# Patient Record
Sex: FEMALE | Race: WHITE | NOT HISPANIC OR LATINO | Employment: FULL TIME | URBAN - METROPOLITAN AREA
[De-identification: names, ages, dates, MRNs, and addresses within clinical notes are randomized per-mention and may not be internally consistent; named-entity substitution may affect disease eponyms.]

---

## 2017-03-23 ENCOUNTER — GENERIC CONVERSION - ENCOUNTER (OUTPATIENT)
Dept: OTHER | Facility: OTHER | Age: 47
End: 2017-03-23

## 2017-06-13 ENCOUNTER — GENERIC CONVERSION - ENCOUNTER (OUTPATIENT)
Dept: OTHER | Facility: OTHER | Age: 47
End: 2017-06-13

## 2017-06-21 ENCOUNTER — GENERIC CONVERSION - ENCOUNTER (OUTPATIENT)
Dept: OTHER | Facility: OTHER | Age: 47
End: 2017-06-21

## 2017-06-26 ENCOUNTER — ALLSCRIPTS OFFICE VISIT (OUTPATIENT)
Dept: OTHER | Facility: OTHER | Age: 47
End: 2017-06-26

## 2017-06-26 ENCOUNTER — APPOINTMENT (OUTPATIENT)
Dept: LAB | Facility: CLINIC | Age: 47
End: 2017-06-26
Payer: COMMERCIAL

## 2017-06-26 DIAGNOSIS — E55.9 VITAMIN D DEFICIENCY: ICD-10-CM

## 2017-06-26 DIAGNOSIS — E78.00 PURE HYPERCHOLESTEROLEMIA: ICD-10-CM

## 2017-06-26 LAB
25(OH)D3 SERPL-MCNC: 35.7 NG/ML (ref 30–100)
ALBUMIN SERPL BCP-MCNC: 3.6 G/DL (ref 3.5–5)
ALP SERPL-CCNC: 76 U/L (ref 46–116)
ALT SERPL W P-5'-P-CCNC: 22 U/L (ref 12–78)
ANION GAP SERPL CALCULATED.3IONS-SCNC: 4 MMOL/L (ref 4–13)
AST SERPL W P-5'-P-CCNC: 22 U/L (ref 5–45)
BASOPHILS # BLD AUTO: 0.03 THOUSANDS/ΜL (ref 0–0.1)
BASOPHILS NFR BLD AUTO: 1 % (ref 0–1)
BILIRUB SERPL-MCNC: 0.44 MG/DL (ref 0.2–1)
BUN SERPL-MCNC: 15 MG/DL (ref 5–25)
CALCIUM SERPL-MCNC: 8.3 MG/DL (ref 8.3–10.1)
CHLORIDE SERPL-SCNC: 108 MMOL/L (ref 100–108)
CHOLEST SERPL-MCNC: 204 MG/DL (ref 50–200)
CO2 SERPL-SCNC: 29 MMOL/L (ref 21–32)
CREAT SERPL-MCNC: 0.62 MG/DL (ref 0.6–1.3)
EOSINOPHIL # BLD AUTO: 0.11 THOUSAND/ΜL (ref 0–0.61)
EOSINOPHIL NFR BLD AUTO: 2 % (ref 0–6)
ERYTHROCYTE [DISTWIDTH] IN BLOOD BY AUTOMATED COUNT: 13.2 % (ref 11.6–15.1)
GFR SERPL CREATININE-BSD FRML MDRD: >60 ML/MIN/1.73SQ M
GLUCOSE P FAST SERPL-MCNC: 100 MG/DL (ref 65–99)
HCT VFR BLD AUTO: 39.9 % (ref 34.8–46.1)
HDLC SERPL-MCNC: 56 MG/DL (ref 40–60)
HGB BLD-MCNC: 13.1 G/DL (ref 11.5–15.4)
LDLC SERPL CALC-MCNC: 138 MG/DL (ref 0–100)
LYMPHOCYTES # BLD AUTO: 1.74 THOUSANDS/ΜL (ref 0.6–4.47)
LYMPHOCYTES NFR BLD AUTO: 38 % (ref 14–44)
MCH RBC QN AUTO: 31.6 PG (ref 26.8–34.3)
MCHC RBC AUTO-ENTMCNC: 32.8 G/DL (ref 31.4–37.4)
MCV RBC AUTO: 96 FL (ref 82–98)
MONOCYTES # BLD AUTO: 0.43 THOUSAND/ΜL (ref 0.17–1.22)
MONOCYTES NFR BLD AUTO: 10 % (ref 4–12)
NEUTROPHILS # BLD AUTO: 2.22 THOUSANDS/ΜL (ref 1.85–7.62)
NEUTS SEG NFR BLD AUTO: 49 % (ref 43–75)
NRBC BLD AUTO-RTO: 0 /100 WBCS
PLATELET # BLD AUTO: 200 THOUSANDS/UL (ref 149–390)
PMV BLD AUTO: 13.2 FL (ref 8.9–12.7)
POTASSIUM SERPL-SCNC: 4.5 MMOL/L (ref 3.5–5.3)
PROT SERPL-MCNC: 6.5 G/DL (ref 6.4–8.2)
RBC # BLD AUTO: 4.15 MILLION/UL (ref 3.81–5.12)
SODIUM SERPL-SCNC: 141 MMOL/L (ref 136–145)
TRIGL SERPL-MCNC: 49 MG/DL
TSH SERPL DL<=0.05 MIU/L-ACNC: 1.4 UIU/ML (ref 0.36–3.74)
WBC # BLD AUTO: 4.55 THOUSAND/UL (ref 4.31–10.16)

## 2017-06-26 PROCEDURE — 80061 LIPID PANEL: CPT

## 2017-06-26 PROCEDURE — 85025 COMPLETE CBC W/AUTO DIFF WBC: CPT

## 2017-06-26 PROCEDURE — 80053 COMPREHEN METABOLIC PANEL: CPT

## 2017-06-26 PROCEDURE — 82306 VITAMIN D 25 HYDROXY: CPT

## 2017-06-26 PROCEDURE — 36415 COLL VENOUS BLD VENIPUNCTURE: CPT

## 2017-06-26 PROCEDURE — 84443 ASSAY THYROID STIM HORMONE: CPT

## 2017-10-11 DIAGNOSIS — E55.9 VITAMIN D DEFICIENCY: ICD-10-CM

## 2017-10-11 DIAGNOSIS — E78.00 PURE HYPERCHOLESTEROLEMIA: ICD-10-CM

## 2017-12-15 ENCOUNTER — ALLSCRIPTS OFFICE VISIT (OUTPATIENT)
Dept: OTHER | Facility: OTHER | Age: 47
End: 2017-12-15

## 2017-12-15 ENCOUNTER — APPOINTMENT (OUTPATIENT)
Dept: LAB | Facility: CLINIC | Age: 47
End: 2017-12-15
Payer: COMMERCIAL

## 2017-12-15 DIAGNOSIS — E55.9 VITAMIN D DEFICIENCY: ICD-10-CM

## 2017-12-15 DIAGNOSIS — E78.00 PURE HYPERCHOLESTEROLEMIA: ICD-10-CM

## 2017-12-15 LAB
25(OH)D3 SERPL-MCNC: 25.3 NG/ML (ref 30–100)
ALBUMIN SERPL BCP-MCNC: 4 G/DL (ref 3.5–5)
ALP SERPL-CCNC: 78 U/L (ref 46–116)
ALT SERPL W P-5'-P-CCNC: 21 U/L (ref 12–78)
ANION GAP SERPL CALCULATED.3IONS-SCNC: 7 MMOL/L (ref 4–13)
AST SERPL W P-5'-P-CCNC: 17 U/L (ref 5–45)
BASOPHILS # BLD AUTO: 0.03 THOUSANDS/ΜL (ref 0–0.1)
BASOPHILS NFR BLD AUTO: 1 % (ref 0–1)
BILIRUB SERPL-MCNC: 0.66 MG/DL (ref 0.2–1)
BUN SERPL-MCNC: 11 MG/DL (ref 5–25)
CALCIUM SERPL-MCNC: 9.5 MG/DL (ref 8.3–10.1)
CHLORIDE SERPL-SCNC: 106 MMOL/L (ref 100–108)
CHOLEST SERPL-MCNC: 193 MG/DL (ref 50–200)
CO2 SERPL-SCNC: 26 MMOL/L (ref 21–32)
CREAT SERPL-MCNC: 0.66 MG/DL (ref 0.6–1.3)
EOSINOPHIL # BLD AUTO: 0.13 THOUSAND/ΜL (ref 0–0.61)
EOSINOPHIL NFR BLD AUTO: 2 % (ref 0–6)
ERYTHROCYTE [DISTWIDTH] IN BLOOD BY AUTOMATED COUNT: 12.4 % (ref 11.6–15.1)
GFR SERPL CREATININE-BSD FRML MDRD: 106 ML/MIN/1.73SQ M
GLUCOSE P FAST SERPL-MCNC: 99 MG/DL (ref 65–99)
HCT VFR BLD AUTO: 41.3 % (ref 34.8–46.1)
HDLC SERPL-MCNC: 53 MG/DL (ref 40–60)
HGB BLD-MCNC: 13.8 G/DL (ref 11.5–15.4)
LDLC SERPL CALC-MCNC: 124 MG/DL (ref 0–100)
LYMPHOCYTES # BLD AUTO: 1.5 THOUSANDS/ΜL (ref 0.6–4.47)
LYMPHOCYTES NFR BLD AUTO: 25 % (ref 14–44)
MCH RBC QN AUTO: 31.2 PG (ref 26.8–34.3)
MCHC RBC AUTO-ENTMCNC: 33.4 G/DL (ref 31.4–37.4)
MCV RBC AUTO: 93 FL (ref 82–98)
MONOCYTES # BLD AUTO: 0.49 THOUSAND/ΜL (ref 0.17–1.22)
MONOCYTES NFR BLD AUTO: 8 % (ref 4–12)
NEUTROPHILS # BLD AUTO: 3.91 THOUSANDS/ΜL (ref 1.85–7.62)
NEUTS SEG NFR BLD AUTO: 64 % (ref 43–75)
NRBC BLD AUTO-RTO: 0 /100 WBCS
PLATELET # BLD AUTO: 223 THOUSANDS/UL (ref 149–390)
PMV BLD AUTO: 12.6 FL (ref 8.9–12.7)
POTASSIUM SERPL-SCNC: 4.4 MMOL/L (ref 3.5–5.3)
PROT SERPL-MCNC: 7.4 G/DL (ref 6.4–8.2)
RBC # BLD AUTO: 4.43 MILLION/UL (ref 3.81–5.12)
SODIUM SERPL-SCNC: 139 MMOL/L (ref 136–145)
TRIGL SERPL-MCNC: 78 MG/DL
TSH SERPL DL<=0.05 MIU/L-ACNC: 1.05 UIU/ML (ref 0.36–3.74)
WBC # BLD AUTO: 6.08 THOUSAND/UL (ref 4.31–10.16)

## 2017-12-15 PROCEDURE — 80053 COMPREHEN METABOLIC PANEL: CPT

## 2017-12-15 PROCEDURE — 82306 VITAMIN D 25 HYDROXY: CPT

## 2017-12-15 PROCEDURE — 80061 LIPID PANEL: CPT

## 2017-12-15 PROCEDURE — 85025 COMPLETE CBC W/AUTO DIFF WBC: CPT

## 2017-12-15 PROCEDURE — 36415 COLL VENOUS BLD VENIPUNCTURE: CPT

## 2017-12-15 PROCEDURE — 84443 ASSAY THYROID STIM HORMONE: CPT

## 2017-12-16 NOTE — PROGRESS NOTES
Assessment  1  Acute bronchitis (466 0) (J20 9)    Plan  Acute bronchitis    · Azithromycin 250 MG Oral Tablet; TAKE 2 TABLETS ON DAY 1 THEN TAKE 1TABLET A DAY FOR 4 DAYS   · Benzonatate 100 MG Oral Capsule; TAKE 1 CAPSULE 3 TIMES DAILY AS NEEDED   · Proventil  (90 Base) MCG/ACT Inhalation Aerosol Solution; INHALE 1 TO 2PUFFS EVERY 4 TO 6 HOURS AS NEEDED    Discussion/Summary    She will take Tylenol as needed  The patient was counseled regarding instructions for management,-- risk factor reductions,-- risks and benefits of treatment options,-- importance of compliance with treatment  Possible side effects of new medications were reviewed with the patient/guardian today  The treatment plan was reviewed with the patient/guardian  The patient/guardian understands and agrees with the treatment plan      Chief Complaint  cold      History of Present Illness  Bronchitis, Acute, Adult (Brief): The patient is being seen for worsening symptoms of acute bronchitis  Symptoms:  productive cough,-- wheezing,-- shortness of breath,-- fatigue,-- sore throat-- and-- stuffy nose  The patient is currently experiencing symptoms  Associated symptoms:  headache-- and-- postnasal drainage  Current treatment includes non-prescription cough suppressants  Review of Systems   Constitutional: as noted in HPI   ENT: as noted in HPI  Cardiovascular: no complaints of slow or fast heart rate, no chest pain, no palpitations, no leg claudication or lower extremity edema  Respiratory: as noted in HPI  Breasts: no complaints of breast pain, breast lump or nipple discharge  Gastrointestinal: no complaints of abdominal pain, no constipation, no nausea or diarrhea, no vomiting, no bloody stools  Genitourinary: no complaints of dysuria, no incontinence, no pelvic pain, no dysmenorrhea, no vaginal discharge or abnormal vaginal bleeding    Musculoskeletal: no complaints of arthralgia, no myalgia, no joint swelling or stiffness, no limb pain or swelling  Integumentary: no complaints of skin rash or lesion, no itching or dry skin, no skin wounds  Neurological: no complaints of headache, no confusion, no numbness or tingling, no dizziness or fainting  Active Problems  1  Breast lump (611 72) (N63 0)   2  Depression (311) (F32 9)   3  Hypercholesteremia (272 0) (E78 00)   4  Obesity (278 00) (E66 9)   5  Systemic lupus erythematosus (710 0) (M32 9)   6  Vitamin D deficiency (268 9) (E55 9)    Past Medical History  Active Problems And Past Medical History Reviewed: The active problems and past medical history were reviewed and updated today  Social History   · Currently working   · Teacher   ·    · Minimum alcohol consumption   · Never a smoker   · Non-smoker (V49 89) (Z78 9)  The social history was reviewed and updated today  The social history was reviewed and is unchanged  Current Meds   1  SAMe 400 MG Oral Tablet; Therapy: [id number] to Recorded    The medication list was reviewed and updated today  Allergies  1  Codeine Derivatives   2  Contrast Media Ready-Box MISC   3  Hydrocodone-Acetaminophen TABS   4  Morphine Sulfate TABS   5  Oxycodone-Acetaminophen TABS   6  Vicodin TABS  7  No Known Environmental Allergies   8  No Known Food Allergies    Vitals   Recorded: [id number] 11:33AM   Temperature 98 4 F   Heart Rate 68   Respiration 14   Systolic 432   Diastolic 78   Height 5 ft 1 in   Weight 160 lb 8 oz   BMI Calculated 30 33   BSA Calculated 1 72     Physical Exam   Constitutional  General appearance: Abnormal   appears tired  Eyes  Conjunctiva and lids: No swelling, erythema or discharge  Ears, Nose, Mouth, and Throat  External inspection of ears and nose: Normal    Otoscopic examination: Tympanic membranes translucent with normal light reflex  Canals patent without erythema  Oropharynx: Abnormal   The posterior pharynx was erythematous    Pulmonary  Respiratory effort: No increased work of breathing or signs of respiratory distress  Auscultation of lungs: Abnormal   Auscultation of the lungs revealed expiratory wheezing  rhonchi over both bases  Cardiovascular  Palpation of heart: Normal PMI, no thrills  Auscultation of heart: Normal rate and rhythm, normal S1 and S2, without murmurs  Abdomen  Abdomen: Non-tender, no masses  Lymphatic  Palpation of lymph nodes in neck: No lymphadenopathy  Musculoskeletal  Gait and station: Normal    Skin  Skin and subcutaneous tissue: Normal without rashes or lesions  Neurologic  Cranial nerves: Cranial nerves 2-12 intact  Psychiatric  Orientation to person, place, and time: Normal    Mood and affect: Normal          Future Appointments    Date/Time Provider Specialty Site   01/04/2018 03:45 PM IVA Jolley   Internal [facility]     Signatures   Electronically signed by : IVA Olmos ; Dec 15 2017 11:56AM EST                       (Author)

## 2018-01-12 VITALS
HEIGHT: 61 IN | WEIGHT: 190.13 LBS | SYSTOLIC BLOOD PRESSURE: 114 MMHG | BODY MASS INDEX: 35.9 KG/M2 | HEART RATE: 72 BPM | DIASTOLIC BLOOD PRESSURE: 70 MMHG

## 2018-01-23 VITALS
HEIGHT: 61 IN | RESPIRATION RATE: 14 BRPM | TEMPERATURE: 98.4 F | SYSTOLIC BLOOD PRESSURE: 110 MMHG | DIASTOLIC BLOOD PRESSURE: 78 MMHG | WEIGHT: 160.5 LBS | HEART RATE: 68 BPM | BODY MASS INDEX: 30.3 KG/M2

## 2019-02-04 RX ORDER — VENLAFAXINE HYDROCHLORIDE 75 MG/1
CAPSULE, EXTENDED RELEASE ORAL
COMMUNITY
Start: 2014-08-25 | End: 2019-02-06

## 2019-02-04 RX ORDER — CHOLECALCIFEROL (VITAMIN D3) 125 MCG
1 CAPSULE ORAL DAILY
COMMUNITY
Start: 2017-12-15 | End: 2019-02-06

## 2019-02-04 RX ORDER — ALBUTEROL SULFATE 90 UG/1
2 AEROSOL, METERED RESPIRATORY (INHALATION)
COMMUNITY
Start: 2017-12-15 | End: 2019-02-06

## 2019-02-05 ENCOUNTER — TELEPHONE (OUTPATIENT)
Dept: INTERNAL MEDICINE CLINIC | Facility: CLINIC | Age: 49
End: 2019-02-05

## 2019-02-05 NOTE — TELEPHONE ENCOUNTER
Patient has an appt with Napoleon Pickard on 2/6    She dropped off blood work that she got done at another office    Put in [employer]

## 2019-02-06 ENCOUNTER — APPOINTMENT (OUTPATIENT)
Dept: LAB | Facility: CLINIC | Age: 49
End: 2019-02-06
Payer: COMMERCIAL

## 2019-02-06 ENCOUNTER — OFFICE VISIT (OUTPATIENT)
Dept: INTERNAL MEDICINE CLINIC | Facility: CLINIC | Age: 49
End: 2019-02-06
Payer: COMMERCIAL

## 2019-02-06 VITALS
SYSTOLIC BLOOD PRESSURE: 100 MMHG | HEART RATE: 58 BPM | OXYGEN SATURATION: 98 % | BODY MASS INDEX: 32.97 KG/M2 | WEIGHT: 174.6 LBS | HEIGHT: 61 IN | DIASTOLIC BLOOD PRESSURE: 70 MMHG

## 2019-02-06 DIAGNOSIS — R39.9 UTI SYMPTOMS: ICD-10-CM

## 2019-02-06 DIAGNOSIS — F41.9 ANXIETY: ICD-10-CM

## 2019-02-06 DIAGNOSIS — F33.41 RECURRENT MAJOR DEPRESSIVE DISORDER, IN PARTIAL REMISSION (HCC): Primary | ICD-10-CM

## 2019-02-06 DIAGNOSIS — E78.00 HYPERCHOLESTEREMIA: ICD-10-CM

## 2019-02-06 DIAGNOSIS — E66.9 OBESITY (BMI 30-39.9): ICD-10-CM

## 2019-02-06 DIAGNOSIS — M32.9 SYSTEMIC LUPUS ERYTHEMATOSUS, UNSPECIFIED SLE TYPE, UNSPECIFIED ORGAN INVOLVEMENT STATUS (HCC): ICD-10-CM

## 2019-02-06 DIAGNOSIS — E55.9 VITAMIN D DEFICIENCY: ICD-10-CM

## 2019-02-06 PROBLEM — N63.0 BREAST LUMP: Status: ACTIVE | Noted: 2017-06-26

## 2019-02-06 PROCEDURE — 87077 CULTURE AEROBIC IDENTIFY: CPT

## 2019-02-06 PROCEDURE — 99214 OFFICE O/P EST MOD 30 MIN: CPT | Performed by: INTERNAL MEDICINE

## 2019-02-06 PROCEDURE — 81001 URINALYSIS AUTO W/SCOPE: CPT | Performed by: INTERNAL MEDICINE

## 2019-02-06 PROCEDURE — 87147 CULTURE TYPE IMMUNOLOGIC: CPT

## 2019-02-06 PROCEDURE — 87186 SC STD MICRODIL/AGAR DIL: CPT

## 2019-02-06 PROCEDURE — 87086 URINE CULTURE/COLONY COUNT: CPT

## 2019-02-06 NOTE — PROGRESS NOTES
INTERNAL MEDICINE OFFICE VISIT  [facility] AT [address]  Tel: [phone]      NAME: Jarad Rhodes  AGE: 50 y o  SEX: female  : 1970   MRN: [id number]    DATE: 2019  TIME: 5:14 PM      Assessment and Plan:  1  Recurrent major depressive disorder, in partial remission ([facility] )  Patient was on Effexor earlier but does not want take medication any more  She was told to follow up with the therapist regularly  2  Anxiety  She was considering taking Xanax as needed  I did not encourage her to take the benzodiazepines as a treatment for anxiety  I told her to take the SSRIs instead but she was not ready to take it  She will think about it and let me know if she needs medication  3  Hypercholesteremia  She was told to take a low-fat diet    4  Systemic lupus erythematosus, unspecified SLE type, unspecified organ involvement status ([facility] )  Follow up with Rheumatology    5  Vitamin D deficiency  She is not taking the vitamin-D supplement  6  Obesity (BMI 30-39  9)  BMI Counseling: Body mass index is 32 99 kg/m²  Discussed the patient's BMI with her  The BMI is above average  BMI counseling and education was provided to the patient  Nutrition recommendations include reducing portion sizes, decreasing overall calorie intake and moderation in carbohydrate intake  Exercise recommendations include moderate aerobic physical activity for 150 minutes/week  7  UTI symptoms    - Urine culture; Future  - Urinalysis with microscopic      - Counseling Documentation: patient was counseled regarding: diagnostic results, instructions for management, risk factor reductions, prognosis, patient and family education, impressions, risks and benefits of treatment options and importance of compliance with treatment  - Medication Side Effects: Adverse side effects of medications were reviewed with the patient/guardian today        Return for follow up visit in 4 months or earlier, if needed  Chief Complaint:  Chief Complaint   Patient presents with    Follow-up     depression         History of Present Illness:   Depression-she has been following up with a therapist but does not want take the Effexor again  Anxiety-she is considering taking Xanax as needed  Hyperlipidemia-she is not taking any medication and wants to control her diet  Lupus-she has been following up with Rheumatology but is not taking any medication  Currently she is having a flare up  Vitamin-D deficiency-she has been deficient in vitamin-D but does not want take the supplement  Obesity-she has to lose weight  UTI symptoms-she had a urine microscopic exam and it was abnormal       Active Problem List:  Patient Active Problem List   Diagnosis    Recurrent major depressive disorder, in partial remission ([facility] )    Breast lump    Hypercholesteremia    Obesity (BMI 30-39  9)    Systemic lupus erythematosus ([facility] )    Vitamin D deficiency    Anxiety         Past Medical History:  Past Medical History:   Diagnosis Date    Anxiety     Chronic pain of right knee 2016    Last assessed     Lupus     Migrainous headache without aura 2016    Last assessed         Past Surgical History:  Past Surgical History:   Procedure Laterality Date    APPENDECTOMY      CARPAL TUNNEL RELEASE       SECTION      x2    KNEE ARTHROSCOPY Right     MYOMECTOMY           Family History:  Family History   Problem Relation Age of Onset    Other Mother         Back disorder     Diabetes Father     Hypertension Father     Other Sister         Back disorder     Cancer Paternal Grandmother          Social History:  Social History     Social History    Marital status: /Civil Union     Spouse name: N/A    Number of children: N/A    Years of education: N/A     Occupational History    Teacher [employer]     Social History Main Topics    Smoking status: Never Smoker    Smokeless tobacco: Never Used    Alcohol use Yes      Comment: Minimal     Drug use: No    Sexual activity: Not Asked     Other Topics Concern    None     Social History Narrative    None         Allergies: Allergies   Allergen Reactions    Acetaminophen-Codeine     Codeine Sulfate     Hydrocodone     Iodinated Diagnostic Agents     Iodine     Morphine     Oxycodone          Medications:  No current outpatient prescriptions on file        The following portions of the patient's history were reviewed and updated as appropriate: past medical history, past surgical history, family history, social history, allergies, current medications and active problem list       Review of Systems:  Constitutional: Denies fever, chills, weight gain, weight loss, fatigue  Eyes: Denies eye redness, eye discharge, double vision, change in visual acuity  ENT: Denies hearing loss, tinnitus, sneezing, nasal congestion, nasal discharge, sore throat   Respiratory: Denies cough, expectoration, hemoptysis, shortness of breath, wheezing  Cardiovascular: Denies chest pain, palpitations, lower extremity swelling, orthopnea, PND  Gastrointestinal: Denies abdominal pain, heartburn, nausea, vomiting, hematemesis, diarrhea, bloody stools  Genito-Urinary: Denies dysuria, frequency, difficulty in micturition, nocturia, incontinence  Musculoskeletal: Denies back pain, joint pain, muscle pain  Neurologic: Denies confusion, lightheadedness, syncope, headache, focal weakness, sensory changes, seizures  Endocrine: Denies polyuria, polydipsia, temperature intolerance  Allergy and Immunology: Denies hives, insect bite sensitivity  Hematological and Lymphatic: Denies bleeding problems, swollen glands   Psychological:  has depression, anxiety, mood swings  Dermatological: Denies pruritus, rash, skin lesion changes      Vitals:  Vitals:    02/06/19 1643   BP: 100/70   Pulse: 58   SpO2: 98%       Body mass index is 32 99 kg/m²  Weight (last 2 days)     Date/Time   Weight    02/06/19 1643  79 2 (174 6)                Physical Examination:  General: Patient is not in acute distress  Awake, alert, responding to commands  No weight gain or loss  Head: Normocephalic  Atraumatic  Eyes: Conjunctiva and lids with no swelling, erythema or discharge  Both pupils normal sized, round and reactive to light  Sclera nonicteric  ENT: External examination of nose and ear normal  Otoscopic examination shows translucent tympanic membranes with patent canals without erythema  Oropharynx moist with no erythema, edema, exudate or lesions  Neck: Supple  JVP not raised  Trachea midline  No masses  No thyromegaly  Lungs: No signs of increased work of breathing or respiratory distress  Bilateral bronchovascular breath sounds with no crackles or rhonchi  Chest wall: No tenderness  Cardiovascular: Normal PMI  No thrills  Regular rate and rhythm  S1 and S2 normal  No murmur, rub or gallop  Gastrointestinal: Abdomen soft, nontender  No guarding or rigidity  Liver and spleen not palpable  Bowel sounds present  Neurologic: Cranial nerves II-XII intact   Cortical functions normal  Motor system - Reflexes 2+ and symmetrical  Sensations normal  Musculoskeletal: Gait normal  No joint tenderness  Integumentary: Skin normal with no rash or lesions  Lymphatic: No palpable lymph nodes in neck, axilla or groin  Extremities: No clubbing, cyanosis, edema or varicosities  Psychological: Judgement and insight normal  Depressed      Laboratory Results:  CBC with diff:   Lab Results   Component Value Date    WBC 6 08 12/15/2017    RBC 4 43 12/15/2017    HGB 13 8 12/15/2017    HCT 41 3 12/15/2017    MCV 93 12/15/2017    MCH 31 2 12/15/2017    RDW 12 4 12/15/2017     12/15/2017       CMP:  Lab Results   Component Value Date    CREATININE 0 66 12/15/2017    BUN 11 12/15/2017    K 4 4 12/15/2017     12/15/2017    CO2 26 12/15/2017    ALKPHOS 78 12/15/2017    ALT 21 12/15/2017    AST 17 12/15/2017       No results found for: HGBA1C, MG, PHOS    No results found for: TROPONINI, CKMB, CKTOTAL    Lipid Profile:   No results found for: CHOL  Lab Results   Component Value Date    HDL 53 12/15/2017    HDL 56 06/26/2017     Lab Results   Component Value Date    LDLCALC 124 (H) 12/15/2017    LDLCALC 138 (H) 06/26/2017     Lab Results   Component Value Date    TRIG 78 12/15/2017    TRIG 49 06/26/2017         Health Maintenance:  Health Maintenance   Topic Date Due    DTaP,Tdap,and Td Vaccines (1 - Tdap) 06/06/1991    PAP SMEAR  06/06/1991    MAMMOGRAM  07/10/2019    Depression Screening PHQ  02/06/2020    INFLUENZA VACCINE  Completed     Immunization History   Administered Date(s) Administered    Influenza Quadrivalent Preservative Free 3 years and older IM 08/29/2017         Juan Manuel Gilbert MD  2/6/2019,5:14 PM

## 2019-02-07 LAB
BACTERIA UR QL AUTO: ABNORMAL /HPF
BILIRUB UR QL STRIP: NEGATIVE
CLARITY UR: CLEAR
COLOR UR: YELLOW
GLUCOSE UR STRIP-MCNC: NEGATIVE MG/DL
HGB UR QL STRIP.AUTO: NEGATIVE
KETONES UR STRIP-MCNC: NEGATIVE MG/DL
LEUKOCYTE ESTERASE UR QL STRIP: NEGATIVE
MUCOUS THREADS UR QL AUTO: ABNORMAL
NITRITE UR QL STRIP: POSITIVE
NON-SQ EPI CELLS URNS QL MICRO: ABNORMAL /HPF
PH UR STRIP.AUTO: 6.5 [PH] (ref 4.5–8)
PROT UR STRIP-MCNC: NEGATIVE MG/DL
RBC #/AREA URNS AUTO: ABNORMAL /HPF
SP GR UR STRIP.AUTO: 1.01 (ref 1–1.03)
UROBILINOGEN UR QL STRIP.AUTO: 0.2 E.U./DL
WBC #/AREA URNS AUTO: ABNORMAL /HPF

## 2019-02-08 ENCOUNTER — OFFICE VISIT (OUTPATIENT)
Dept: INTERNAL MEDICINE CLINIC | Facility: CLINIC | Age: 49
End: 2019-02-08
Payer: COMMERCIAL

## 2019-02-08 ENCOUNTER — TELEPHONE (OUTPATIENT)
Dept: INTERNAL MEDICINE CLINIC | Facility: CLINIC | Age: 49
End: 2019-02-08

## 2019-02-08 VITALS
DIASTOLIC BLOOD PRESSURE: 60 MMHG | SYSTOLIC BLOOD PRESSURE: 110 MMHG | TEMPERATURE: 97.7 F | BODY MASS INDEX: 32.66 KG/M2 | HEIGHT: 61 IN | OXYGEN SATURATION: 98 % | WEIGHT: 173 LBS | HEART RATE: 56 BPM

## 2019-02-08 DIAGNOSIS — N39.0 URINARY TRACT INFECTION WITHOUT HEMATURIA, SITE UNSPECIFIED: Primary | ICD-10-CM

## 2019-02-08 DIAGNOSIS — E66.9 OBESITY (BMI 30-39.9): ICD-10-CM

## 2019-02-08 DIAGNOSIS — K11.20 PAROTIDITIS: Primary | ICD-10-CM

## 2019-02-08 LAB
BACTERIA UR CULT: ABNORMAL

## 2019-02-08 PROCEDURE — 99214 OFFICE O/P EST MOD 30 MIN: CPT | Performed by: PHYSICIAN ASSISTANT

## 2019-02-08 RX ORDER — CIPROFLOXACIN 500 MG/1
500 TABLET, FILM COATED ORAL EVERY 12 HOURS SCHEDULED
Qty: 1414 TABLET | Refills: 0 | Status: SHIPPED | OUTPATIENT
Start: 2019-02-08 | End: 2019-02-15

## 2019-02-08 RX ORDER — CLINDAMYCIN HYDROCHLORIDE 300 MG/1
300 CAPSULE ORAL EVERY 6 HOURS SCHEDULED
Qty: 28 CAPSULE | Refills: 0 | Status: SHIPPED | OUTPATIENT
Start: 2019-02-08 | End: 2019-02-15

## 2019-02-08 NOTE — TELEPHONE ENCOUNTER
----- Message from Farhan Castillo MD sent at 2/8/2019  5:44 PM EST -----  Patient has a urinary tract infection which needs to be treated  I saw that she was given an antibiotic for parotitis this morning  I want her to 1st finish the clindamycin for 7 days and then take the antibiotic for the UTI which was sent to the pharmacy    I called her and left a message but have please inform the patient so she knows what to do

## 2019-02-08 NOTE — PROGRESS NOTES
Assessment/Plan:  Tender left parotid area  Will cover this with clindamycin warm soaks she return in 3 days if not resolved       Diagnoses and all orders for this visit:    Parotiditis  -     clindamycin (CLEOCIN) 300 MG capsule; Take 1 capsule (300 mg total) by mouth every 6 (six) hours for 7 days    Obesity (BMI 30-39  9)        No problem-specific Assessment & Plan notes found for this encounter  Subjective:      Patient ID: Savannah Gallegos is a 50 y o  female  She developed pain in left angle of her mandible this morning she felt a fullness and a lump that this morning this has since become less swollen but still sore no fever no other cold symptoms no sore throat runny nose coughing her appetite has been good  No pain with eating        The following portions of the patient's history were reviewed and updated as appropriate:   She has a past medical history of Anxiety; Chronic pain of right knee (2016); Lupus; and Migrainous headache without aura (2016)  ,   does not have any pertinent problems on file  ,   has a past surgical history that includes Appendectomy;  section; Knee arthroscopy (Right); Carpal tunnel release; and Myomectomy  ,  family history includes Cancer in her paternal grandmother; Diabetes in her father; Hypertension in her father; Other in her mother and sister  ,   reports that she has never smoked  She has never used smokeless tobacco  She reports that she drinks alcohol  She reports that she does not use drugs  ,  is allergic to acetaminophen-codeine; codeine sulfate; hydrocodone; iodinated diagnostic agents; iodine; morphine; and oxycodone     Current Outpatient Prescriptions   Medication Sig Dispense Refill    clindamycin (CLEOCIN) 300 MG capsule Take 1 capsule (300 mg total) by mouth every 6 (six) hours for 7 days 28 capsule 0     No current facility-administered medications for this visit          Review of Systems   Constitutional: Negative for activity change, appetite change, chills, diaphoresis, fatigue, fever and unexpected weight change  HENT: Negative for congestion, dental problem, drooling, ear discharge, ear pain, facial swelling, hearing loss, nosebleeds, postnasal drip, rhinorrhea, sinus pain, sinus pressure, sneezing, sore throat, tinnitus, trouble swallowing and voice change  Eyes: Negative for photophobia, pain, discharge, redness, itching and visual disturbance  Respiratory: Negative for apnea, cough, choking, chest tightness, shortness of breath, wheezing and stridor  Cardiovascular: Negative for chest pain, palpitations and leg swelling  Gastrointestinal: Negative for abdominal distention, abdominal pain, anal bleeding, blood in stool, constipation, diarrhea, nausea, rectal pain and vomiting  Endocrine: Negative for cold intolerance, heat intolerance, polydipsia, polyphagia and polyuria  Genitourinary: Negative for decreased urine volume, difficulty urinating, dysuria, enuresis, flank pain, frequency, genital sores, hematuria and urgency  Musculoskeletal: Negative for arthralgias, back pain, gait problem, joint swelling, myalgias, neck pain and neck stiffness  Skin: Negative for color change, pallor, rash and wound  Neurological: Negative for dizziness, tremors, seizures, syncope, facial asymmetry, speech difficulty, weakness, light-headedness, numbness and headaches  Hematological: Negative for adenopathy  Does not bruise/bleed easily  Psychiatric/Behavioral: Negative for agitation, behavioral problems, confusion, decreased concentration, dysphoric mood, hallucinations, self-injury, sleep disturbance and suicidal ideas  The patient is not nervous/anxious and is not hyperactive            Objective:  Vitals:    02/08/19 1552   BP: 110/60   BP Location: Left arm   Patient Position: Sitting   Pulse: 56   Temp: 97 7 °F (36 5 °C)   SpO2: 98%   Weight: 78 5 kg (173 lb)   Height: 5' 1" (1 549 m)     Body mass index is 32 69 kg/m²  Physical Exam   Constitutional: She is oriented to person, place, and time  She appears well-developed  Obese   HENT:   Head: Normocephalic  Right Ear: External ear normal    Left Ear: External ear normal    Mouth/Throat: Oropharynx is clear and moist  No oropharyngeal exudate  Eyes: Pupils are equal, round, and reactive to light  Conjunctivae are normal    Neck: Neck supple  No thyromegaly present  Tender left parotid region no discrete mass oropharynx is normal no dental problems   Cardiovascular: Normal rate, regular rhythm, normal heart sounds and intact distal pulses  Pulmonary/Chest: Effort normal and breath sounds normal    Abdominal: Soft  Bowel sounds are normal    Musculoskeletal: Normal range of motion  Lymphadenopathy:     She has no cervical adenopathy  Neurological: She is alert and oriented to person, place, and time  She has normal reflexes  Skin: Skin is warm and dry

## 2019-02-22 ENCOUNTER — OFFICE VISIT (OUTPATIENT)
Dept: INTERNAL MEDICINE CLINIC | Facility: CLINIC | Age: 49
End: 2019-02-22
Payer: COMMERCIAL

## 2019-02-22 VITALS
HEIGHT: 61 IN | OXYGEN SATURATION: 98 % | HEART RATE: 70 BPM | DIASTOLIC BLOOD PRESSURE: 76 MMHG | BODY MASS INDEX: 32.66 KG/M2 | WEIGHT: 173 LBS | SYSTOLIC BLOOD PRESSURE: 106 MMHG

## 2019-02-22 DIAGNOSIS — F33.41 RECURRENT MAJOR DEPRESSIVE DISORDER, IN PARTIAL REMISSION (HCC): Primary | ICD-10-CM

## 2019-02-22 PROCEDURE — 3008F BODY MASS INDEX DOCD: CPT | Performed by: INTERNAL MEDICINE

## 2019-02-22 PROCEDURE — 99213 OFFICE O/P EST LOW 20 MIN: CPT | Performed by: INTERNAL MEDICINE

## 2019-02-22 RX ORDER — CIPROFLOXACIN 500 MG/1
500 TABLET, FILM COATED ORAL EVERY 12 HOURS SCHEDULED
COMMUNITY
Start: 2019-02-18 | End: 2019-02-25

## 2019-02-22 RX ORDER — BUPROPION HYDROCHLORIDE 150 MG/1
150 TABLET ORAL DAILY
Qty: 90 TABLET | Refills: 1 | Status: SHIPPED | OUTPATIENT
Start: 2019-02-22 | End: 2019-04-18 | Stop reason: SDUPTHER

## 2019-02-22 NOTE — PROGRESS NOTES
INTERNAL MEDICINE FOLLOW-UP OFFICE VISIT  [facility]    NAME: Fredi Arellano  AGE: 50 y o  SEX: female  : 1970   MRN: [id number]    DATE: 2019  TIME: 4:04 PM    Assessment and Plan     Diagnoses and all orders for this visit:    Recurrent major depressive disorder, in partial remission ([address] )  -     buPROPion (WELLBUTRIN XL) 150 mg 24 hr tablet; Take 1 tablet (150 mg total) by mouth daily  She was told to continue following up with the therapist and start taking medication  I will see her back in 2 months  Other orders  -     ciprofloxacin (CIPRO) 500 mg tablet; Take 500 mg by mouth every 12 (twelve) hours        - Counseling Documentation: patient was counseled regarding: instructions for management, risk factor reductions, prognosis, patient and family education, impressions, risks and benefits of treatment options and importance of compliance with treatment  - Medication Side Effects: Adverse side effects of medications were reviewed with the patient/guardian today  Return to office in: 2 months    Chief Complaint     Chief Complaint   Patient presents with    Follow-up     Review meds       History of Present Illness     Depression   This is a recurrent problem  The current episode started more than 1 month ago  The problem occurs constantly  The problem has been gradually worsening  Pertinent negatives include no abdominal pain, arthralgias, chest pain, chills, congestion, coughing, diaphoresis, fatigue, fever, headaches, joint swelling, myalgias, nausea, neck pain, numbness, rash, sore throat, vomiting or weakness  The symptoms are aggravated by stress  She has tried nothing for the symptoms         The following portions of the patient's history were reviewed and updated as appropriate: allergies, current medications, past family history, past medical history, past social history, past surgical history and problem list     Review of Systems     Review of Systems   Constitutional: Negative for chills, diaphoresis, fatigue and fever  HENT: Negative for congestion, ear discharge, ear pain, hearing loss, postnasal drip, rhinorrhea, sinus pressure, sinus pain, sneezing, sore throat and voice change  Eyes: Negative for pain, discharge, redness and visual disturbance  Respiratory: Negative for cough, chest tightness, shortness of breath and wheezing  Cardiovascular: Negative for chest pain, palpitations and leg swelling  Gastrointestinal: Negative for abdominal distention, abdominal pain, blood in stool, constipation, diarrhea, nausea and vomiting  Endocrine: Negative for cold intolerance, heat intolerance, polydipsia, polyphagia and polyuria  Genitourinary: Negative for dysuria, flank pain, frequency, hematuria and urgency  Musculoskeletal: Negative for arthralgias, back pain, gait problem, joint swelling, myalgias, neck pain and neck stiffness  Skin: Negative for rash  Neurological: Negative for dizziness, tremors, syncope, facial asymmetry, speech difficulty, weakness, light-headedness, numbness and headaches  Hematological: Does not bruise/bleed easily  Psychiatric/Behavioral: Positive for depression and dysphoric mood  Negative for behavioral problems, confusion and sleep disturbance  The patient is nervous/anxious  Active Problem List     Patient Active Problem List   Diagnosis    Recurrent major depressive disorder, in partial remission (HCC)    Breast lump    Hypercholesteremia    Obesity (BMI 30-39  9)    Systemic lupus erythematosus (HCC)    Vitamin D deficiency    Anxiety       Objective     /76   Pulse 70   Ht 5' 1" (1 549 m)   Wt 78 5 kg (173 lb)   SpO2 98%   BMI 32 69 kg/m²     Physical Exam   Constitutional: She is oriented to person, place, and time  She appears well-developed and well-nourished  No distress  HENT:   Head: Normocephalic and atraumatic     Right Ear: External ear normal    Left Ear: External ear normal    Nose: Nose normal    Mouth/Throat: Oropharynx is clear and moist    Eyes: Conjunctivae and EOM are normal  Right eye exhibits no discharge  Left eye exhibits no discharge  No scleral icterus  Neck: Normal range of motion  Neck supple  No JVD present  No tracheal deviation present  No thyromegaly present  Cardiovascular: Normal rate, regular rhythm, normal heart sounds and intact distal pulses  Exam reveals no gallop and no friction rub  No murmur heard  Pulmonary/Chest: Effort normal and breath sounds normal  No respiratory distress  She has no wheezes  She has no rales  She exhibits no tenderness  Abdominal: Soft  Bowel sounds are normal  She exhibits no distension  There is no tenderness  There is no rebound and no guarding  Musculoskeletal: Normal range of motion  She exhibits no edema or tenderness  Lymphadenopathy:     She has no cervical adenopathy  Neurological: She is alert and oriented to person, place, and time  No cranial nerve deficit  She exhibits normal muscle tone  Coordination normal    Skin: Skin is warm and dry  No rash noted  She is not diaphoretic  No erythema     Psychiatric: Judgment normal    Depressed and anxious         Current Medications       Current Outpatient Medications:     ciprofloxacin (CIPRO) 500 mg tablet, Take 500 mg by mouth every 12 (twelve) hours, Disp: , Rfl:     buPROPion (WELLBUTRIN XL) 150 mg 24 hr tablet, Take 1 tablet (150 mg total) by mouth daily, Disp: 90 tablet, Rfl: 1    Health Maintenance     Health Maintenance   Topic Date Due    DTaP,Tdap,and Td Vaccines (1 - Tdap) 06/06/1991    PAP SMEAR  06/06/1991    MAMMOGRAM  07/10/2019    BMI: Followup Plan  02/06/2020    BMI: Adult  02/08/2020    INFLUENZA VACCINE  Completed    HEPATITIS B VACCINES  Aged Out     Immunization History   Administered Date(s) Administered    INFLUENZA 10/10/2014, 10/09/2015, 08/29/2017, 08/28/2018    Influenza Quadrivalent Preservative Free 3 years and older IM 08/29/2017         Daria Escobedo MD  [address] [facility]

## 2019-03-18 ENCOUNTER — OFFICE VISIT (OUTPATIENT)
Dept: BARIATRICS | Facility: CLINIC | Age: 49
End: 2019-03-18
Payer: COMMERCIAL

## 2019-03-18 VITALS
HEART RATE: 64 BPM | SYSTOLIC BLOOD PRESSURE: 110 MMHG | HEIGHT: 61 IN | BODY MASS INDEX: 31.98 KG/M2 | TEMPERATURE: 98.7 F | WEIGHT: 169.4 LBS | RESPIRATION RATE: 18 BRPM | DIASTOLIC BLOOD PRESSURE: 72 MMHG

## 2019-03-18 DIAGNOSIS — E78.00 HYPERCHOLESTEREMIA: ICD-10-CM

## 2019-03-18 DIAGNOSIS — E66.9 OBESITY, CLASS I, BMI 30-34.9: Primary | ICD-10-CM

## 2019-03-18 DIAGNOSIS — F33.41 RECURRENT MAJOR DEPRESSIVE DISORDER, IN PARTIAL REMISSION (HCC): ICD-10-CM

## 2019-03-18 DIAGNOSIS — R73.01 ELEVATED FASTING GLUCOSE: ICD-10-CM

## 2019-03-18 PROBLEM — R63.5 ABNORMAL WEIGHT GAIN: Status: ACTIVE | Noted: 2019-03-18

## 2019-03-18 PROCEDURE — 99203 OFFICE O/P NEW LOW 30 MIN: CPT | Performed by: PHYSICIAN ASSISTANT

## 2019-03-18 RX ORDER — ASPIRIN 81 MG/1
81 TABLET, CHEWABLE ORAL DAILY
COMMUNITY
End: 2021-02-25

## 2019-03-18 NOTE — PATIENT INSTRUCTIONS
Goals: Food log (ie ) www myfitnesspal com,sparkpeople  com,loseit com,calorieking  com,etc  baritastic  No sugary beverages  At least 64oz of water daily  Increase physical activity by 10 minutes daily   Gradually increase physical activity to a goal of 5 days per week for 30 minutes of MODERATE intensity PLUS 2 days per week of FULL BODY resistance training  7733-2965 calories per day  5-10 servings of fruits and vegetables per day-3 servings per day

## 2019-03-18 NOTE — ASSESSMENT & PLAN NOTE
-Discussed options of HealthyCORE-Intensive Lifestyle Intervention Program, Very Low Calorie Diet-VLCD and Conservative Program and the role of weight loss medications   -Initial weight loss goal of 5-10% weight loss for improved health  -Screening labs  Recommend checking lab coverage before having labs drawn   -tsh (4/16/18) ldl and bmp (1/28/19) reviewed and all within acceptable limits  Needs fasting insulin and a1c  - STOP BANG-0/8  -Patient is interested in pursuing Conservative Program   -interested in healthy core alternative but has to speak with      Goals:  Food log (ie ) www myfitnesspal com,sparkpeople  com,loseit com,calorieking  com,etc  baritastic  No sugary beverages  At least 64oz of water daily  Increase physical activity by 10 minutes daily   Gradually increase physical activity to a goal of 5 days per week for 30 minutes of MODERATE intensity PLUS 2 days per week of FULL BODY resistance training  4411-0007 calories per day  5-10 servings of fruits and vegetables per day-3 servings per day

## 2019-03-18 NOTE — PROGRESS NOTES
Assessment/Plan:    Obesity, Class I, BMI 30-34 9  -Discussed options of HealthyCORE-Intensive Lifestyle Intervention Program, Very Low Calorie Diet-VLCD and Conservative Program and the role of weight loss medications   -Initial weight loss goal of 5-10% weight loss for improved health  -Screening labs  Recommend checking lab coverage before having labs drawn   -tsh (4/16/18) ldl and bmp (1/28/19) reviewed and all within acceptable limits  Needs fasting insulin and a1c  - STOP BANG-0/8  -Patient is interested in pursuing Conservative Program   -interested in healthy core alternative but has to speak with      Goals:  Food log (ie ) www myfitnesspal com,sparkpeople  com,loseit com,calorieking  com,etc  baritastic  No sugary beverages  At least 64oz of water daily  Increase physical activity by 10 minutes daily  Gradually increase physical activity to a goal of 5 days per week for 30 minutes of MODERATE intensity PLUS 2 days per week of FULL BODY resistance training  0525-1759 calories per day  5-10 servings of fruits and vegetables per day-3 servings per day       Recurrent major depressive disorder, in partial remission ([facility] [address] )  Depression and anxiety  Taking wellbutrin  -continue management with prescribing provider    Hypercholesteremia  -should improve with weight loss, dietary, and lifestyle changes      Abnormal weight gain  See obesity plan       Follow up in approximately 2 months with Non-Surgical Physician/Advanced Practitioner  Diagnoses and all orders for this visit:    Obesity, Class I, BMI 30-34 9  -     Insulin, fasting; Future  -     HEMOGLOBIN A1C W/ EAG ESTIMATION; Future    Elevated fasting glucose  -     Insulin, fasting; Future  -     HEMOGLOBIN A1C W/ EAG ESTIMATION; Future    Recurrent major depressive disorder, in partial remission (HCC)    Hypercholesteremia    Other orders  -     aspirin 81 mg chewable tablet;  Chew 81 mg daily          Subjective:   Chief Complaint   Patient presents with    Consult     Pt here for MWM consult  Patient ID: Anum Fletcher  is a 50 y o  female with excess weight/obesity here to pursue weight management  Past Medical History:   Diagnosis Date    Anxiety     Chronic pain of right knee 12/07/2016    Last assessed     Depression     Lupus     Migrainous headache without aura 12/07/2016    Last assessed    Rheumatoid arthritis ([facility] [address] )        HPI:  Obesity/Excess Weight:  Severity: Severe  Onset:  Since childhood   Modifiers: Diet and Exercise and Commercial Weight Loss Programs-ie  Weight Watchers, Marney Grow, Nutrisystem, etc   Contributing factors: Insufficient Physical Activity, portion sizes   Associated symptoms: increased joint pain, decreased exercise capacity, body image issues and decreased self esteem    Goals: 140 lbs   Hydration: 4 glasses of water, 2 cups of coffee with creamer, 1 seltzer water per day   Alcohol: 3-4 glasses of wine per month    The following portions of the patient's history were reviewed and updated as appropriate: allergies, current medications, past family history, past medical history, past social history, past surgical history and problem list     Review of Systems   HENT: Negative for sore throat  Respiratory: Negative for cough and shortness of breath  Cardiovascular: Negative for chest pain and palpitations  Gastrointestinal: Negative for abdominal pain, constipation, diarrhea, nausea and vomiting  Denies GERD   Endocrine: Negative for cold intolerance and heat intolerance  Genitourinary: Negative for dysuria  Musculoskeletal: Positive for arthralgias (knees and right hip )  Negative for back pain  Skin: Negative for rash (lupus rash )  Neurological: Positive for headaches (pcp aware )  Psychiatric/Behavioral: Negative for suicidal ideas (denies HI)          + depression and anxiety-controlled        Objective:    /72 (BP Location: Right arm, Patient Position: Sitting, Cuff Size: Standard)   Pulse 64   Temp 98 7 °F (37 1 °C) (Tympanic)   Resp 18   Ht 5' 0 75" (1 543 m)   Wt 76 8 kg (169 lb 6 4 oz)   BMI 32 27 kg/m²     Physical Exam   Nursing note and vitals reviewed  Constitutional   General appearance: Abnormal   well developed and obese  Eyes No conjunctival pallor  Ears, Nose, Mouth, and Throat Oral mucosa moist    Pulmonary   Respiratory effort: No increased work of breathing or signs of respiratory distress  Auscultation of lungs: Clear to auscultation, equal breath sounds bilaterally, no wheezes, no rales, no rhonci  Cardiovascular   Auscultation of heart: Normal rate and rhythm, normal S1 and S2, without murmurs  Examination of extremities for edema and/or varicosities: Normal   no edema  Abdomen   Abdomen: Abnormal   The abdomen was obese  Bowel sounds were normal  The abdomen was soft and nontender     Musculoskeletal   Gait and station: Normal     Psychiatric   Orientation to person, place and time: Normal     Affect: appropriate

## 2019-03-22 ENCOUNTER — TELEPHONE (OUTPATIENT)
Dept: BARIATRICS | Facility: CLINIC | Age: 49
End: 2019-03-22

## 2019-03-22 NOTE — TELEPHONE ENCOUNTER
PT telephoned to advise that she has spoken with her  and they have decided to take the conservative route in this weight loss process  She has been doing well on her own with the recommendations given to her by our PA  She will speak with her PCP at her next scheduled appointment and they will decide if it is necessary for her to keep her follow-up appointment with our PA or just to work on weight loss independently  She will either keep or cancel her upcoming appointment accordingly

## 2019-04-18 ENCOUNTER — OFFICE VISIT (OUTPATIENT)
Dept: INTERNAL MEDICINE CLINIC | Facility: CLINIC | Age: 49
End: 2019-04-18
Payer: COMMERCIAL

## 2019-04-18 ENCOUNTER — APPOINTMENT (OUTPATIENT)
Dept: LAB | Facility: CLINIC | Age: 49
End: 2019-04-18
Payer: COMMERCIAL

## 2019-04-18 VITALS
SYSTOLIC BLOOD PRESSURE: 112 MMHG | DIASTOLIC BLOOD PRESSURE: 72 MMHG | HEART RATE: 73 BPM | BODY MASS INDEX: 31.45 KG/M2 | OXYGEN SATURATION: 97 % | WEIGHT: 166.6 LBS | HEIGHT: 61 IN

## 2019-04-18 DIAGNOSIS — Z13.6 SCREENING FOR CARDIOVASCULAR CONDITION: ICD-10-CM

## 2019-04-18 DIAGNOSIS — F33.41 RECURRENT MAJOR DEPRESSIVE DISORDER, IN PARTIAL REMISSION (HCC): Primary | ICD-10-CM

## 2019-04-18 DIAGNOSIS — R73.01 ELEVATED FASTING GLUCOSE: ICD-10-CM

## 2019-04-18 DIAGNOSIS — E66.9 OBESITY, CLASS I, BMI 30-34.9: ICD-10-CM

## 2019-04-18 LAB
EST. AVERAGE GLUCOSE BLD GHB EST-MCNC: 94 MG/DL
HBA1C MFR BLD: 4.9 % (ref 4.2–6.3)
INSULIN SERPL-ACNC: 7.3 MU/L (ref 3–25)

## 2019-04-18 PROCEDURE — 83036 HEMOGLOBIN GLYCOSYLATED A1C: CPT

## 2019-04-18 PROCEDURE — 83525 ASSAY OF INSULIN: CPT

## 2019-04-18 PROCEDURE — 36415 COLL VENOUS BLD VENIPUNCTURE: CPT

## 2019-04-18 PROCEDURE — 1036F TOBACCO NON-USER: CPT | Performed by: INTERNAL MEDICINE

## 2019-04-18 PROCEDURE — 99213 OFFICE O/P EST LOW 20 MIN: CPT | Performed by: INTERNAL MEDICINE

## 2019-04-18 PROCEDURE — 3008F BODY MASS INDEX DOCD: CPT | Performed by: INTERNAL MEDICINE

## 2019-04-18 RX ORDER — BUPROPION HYDROCHLORIDE 300 MG/1
300 TABLET ORAL DAILY
Qty: 90 TABLET | Refills: 1 | Status: SHIPPED | OUTPATIENT
Start: 2019-04-18 | End: 2019-10-17 | Stop reason: SDUPTHER

## 2019-04-19 ENCOUNTER — TELEPHONE (OUTPATIENT)
Dept: BARIATRICS | Facility: CLINIC | Age: 49
End: 2019-04-19

## 2019-10-17 DIAGNOSIS — F33.41 RECURRENT MAJOR DEPRESSIVE DISORDER, IN PARTIAL REMISSION (HCC): ICD-10-CM

## 2019-10-17 RX ORDER — BUPROPION HYDROCHLORIDE 300 MG/1
TABLET ORAL
Qty: 90 TABLET | Refills: 1 | Status: SHIPPED | OUTPATIENT
Start: 2019-10-17 | End: 2020-01-06 | Stop reason: SDUPTHER

## 2019-11-27 ENCOUNTER — TELEPHONE (OUTPATIENT)
Dept: INTERNAL MEDICINE CLINIC | Facility: CLINIC | Age: 49
End: 2019-11-27

## 2019-11-27 NOTE — TELEPHONE ENCOUNTER
----- Message from Ronel Newby sent at 11/27/2019 10:17 AM EST -----  Regarding: Visit Follow-Up Question  Contact: [phone]  I see that I should get blood work before my appointment on Tuesday  I am not sure whether or not I have the prescription  If I get the work done onsite, will they have the order on file? Also, what are the hours there for blood work? Thank you

## 2019-12-30 ENCOUNTER — OFFICE VISIT (OUTPATIENT)
Dept: OBGYN CLINIC | Facility: CLINIC | Age: 49
End: 2019-12-30
Payer: COMMERCIAL

## 2019-12-30 VITALS
DIASTOLIC BLOOD PRESSURE: 72 MMHG | WEIGHT: 172 LBS | BODY MASS INDEX: 32.47 KG/M2 | HEIGHT: 61 IN | HEART RATE: 74 BPM | SYSTOLIC BLOOD PRESSURE: 116 MMHG

## 2019-12-30 DIAGNOSIS — Z12.31 ENCOUNTER FOR SCREENING MAMMOGRAM FOR MALIGNANT NEOPLASM OF BREAST: ICD-10-CM

## 2019-12-30 DIAGNOSIS — Z01.419 ROUTINE GYNECOLOGICAL EXAMINATION: Primary | ICD-10-CM

## 2019-12-30 PROCEDURE — S0610 ANNUAL GYNECOLOGICAL EXAMINA: HCPCS | Performed by: OBSTETRICS & GYNECOLOGY

## 2019-12-30 PROCEDURE — 87624 HPV HI-RISK TYP POOLED RSLT: CPT | Performed by: OBSTETRICS & GYNECOLOGY

## 2019-12-30 PROCEDURE — G0145 SCR C/V CYTO,THINLAYER,RESCR: HCPCS | Performed by: OBSTETRICS & GYNECOLOGY

## 2019-12-30 NOTE — PROGRESS NOTES
Assessment/Plan:    Routine gynecological examination  Pap/HPV collected  Mammogram ordered    Encourage healthy diet, exercise, Calcium 1200mg per day and at least 800 iu Vitamin D daily  Diagnoses and all orders for this visit:    Routine gynecological examination    Encounter for screening mammogram for malignant neoplasm of breast  -     Mammo screening bilateral w 3d & cad; Future    Other orders  -     Cancel: Liquid-based pap, screening          Subjective:      Patient ID: Cesar Lee is a 52 y o  female  Wale Brooks is here for a annual exam and is new to [facility], transferring from [facility]  She thinks she is menopausal, cycles were very [id number] and LMP May 2019  Hot flashes have calmed down from what they were  No other GYN concerns  Last PAP 6/2017  Last Mammo 7/2018    She has had a lump in her left breast for more then 5 years and has been getting mammos every 6 months, they did Biopsy it in 2017 and it was benign  She states she is not currently sexually active but did have a tubal 2009  Not a smoker and has daily exercise regimen       Teacher,  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    2 sons  15 and 6  Also fostering a dog  Unsure if the dog will stay with them permanently or not  Gynecologic Exam   The patient's pertinent negatives include no genital itching, genital lesions, genital odor, genital rash, pelvic pain, vaginal bleeding or vaginal discharge  The patient is experiencing no pain  Pertinent negatives include no chills, constipation, diarrhea, fever, nausea, sore throat or vomiting  She is not sexually active  Her menstrual history has been irregular  The following portions of the patient's history were reviewed and updated as appropriate:   She  has a past medical history of Anxiety, Chronic pain of right knee (12/07/2016), Depression, Lupus ([facility] [address] ), Migrainous headache without aura (12/07/2016), and Rheumatoid arthritis ([facility] [address] )    She   Patient Active Problem List    Diagnosis Date Noted    Routine gynecological examination 2019    Abnormal weight gain 2019    Anxiety 2019    Breast lump 2017    Vitamin D deficiency 2017    Obesity, Class I, BMI 30-34 9 2016    Hypercholesteremia 2015    Recurrent major depressive disorder, in partial remission ([facility] ) 2014    Systemic lupus erythematosus ([facility] ) 2014     She  has a past surgical history that includes Appendectomy;  section; Knee arthroscopy (Right); Carpal tunnel release; Myomectomy; Tubal ligation (); and Myomectomy  Her family history includes Cancer in her paternal grandmother; Diabetes in her father; Hypertension in her father; Other in her mother and sister; Spina bifida in her mother  She  reports that she has never smoked  She has never used smokeless tobacco  She reports that she drinks alcohol  She reports that she does not use drugs  Current Outpatient Medications   Medication Sig Dispense Refill    buPROPion (WELLBUTRIN XL) 300 mg 24 hr tablet TAKE 1 TABLET BY MOUTH EVERY DAY 90 tablet 1    aspirin 81 mg chewable tablet Chew 81 mg daily       No current facility-administered medications for this visit  Current Outpatient Medications on File Prior to Visit   Medication Sig    buPROPion (WELLBUTRIN XL) 300 mg 24 hr tablet TAKE 1 TABLET BY MOUTH EVERY DAY    aspirin 81 mg chewable tablet Chew 81 mg daily     No current facility-administered medications on file prior to visit  She is allergic to acetaminophen-codeine; codeine sulfate; hydrocodone; iodinated diagnostic agents; iodine; morphine; and oxycodone       Review of Systems   Constitutional: Negative for activity change, appetite change, chills, fatigue and fever  HENT: Negative for rhinorrhea, sneezing and sore throat  Eyes: Negative for visual disturbance  Respiratory: Negative for cough, shortness of breath and wheezing      Cardiovascular: Negative for chest pain, palpitations and leg swelling  Gastrointestinal: Negative for abdominal distention, constipation, diarrhea, nausea and vomiting  Genitourinary: Negative for difficulty urinating, pelvic pain and vaginal discharge  Neurological: Negative for syncope and light-headedness  Objective:      /72 (BP Location: Left arm, Patient Position: Sitting, Cuff Size: Large)   Pulse 74   Ht 5' 1" (1 549 m)   Wt 78 kg (172 lb)   LMP 05/01/2019 (Approximate)   BMI 32 50 kg/m²          Physical Exam   Constitutional: She is oriented to person, place, and time  Pulmonary/Chest: Right breast exhibits no inverted nipple, no mass, no nipple discharge, no skin change and no tenderness  Left breast exhibits no inverted nipple, no mass, no nipple discharge, no skin change and no tenderness  No breast tenderness, discharge or bleeding  Breasts are symmetrical    Genitourinary: Vagina normal and uterus normal  No breast tenderness, discharge or bleeding  There is no rash, tenderness, lesion or injury on the right labia  There is no rash, tenderness, lesion or injury on the left labia  Uterus is not deviated, not enlarged, not fixed and not tender  Cervix exhibits no motion tenderness, no discharge and no friability  Right adnexum displays no mass, no tenderness and no fullness  Left adnexum displays no mass, no tenderness and no fullness  No tenderness or bleeding in the vagina  No vaginal discharge found  Neurological: She is alert and oriented to person, place, and time

## 2019-12-30 NOTE — ASSESSMENT & PLAN NOTE
Pap/HPV collected  Mammogram ordered    Encourage healthy diet, exercise, Calcium 1200mg per day and at least 800 iu Vitamin D daily

## 2019-12-30 NOTE — PATIENT INSTRUCTIONS

## 2020-01-02 LAB
HPV HR 12 DNA CVX QL NAA+PROBE: NEGATIVE
HPV16 DNA CVX QL NAA+PROBE: NEGATIVE
HPV18 DNA CVX QL NAA+PROBE: NEGATIVE

## 2020-01-03 ENCOUNTER — LAB (OUTPATIENT)
Dept: LAB | Facility: CLINIC | Age: 50
End: 2020-01-03
Payer: COMMERCIAL

## 2020-01-03 DIAGNOSIS — F33.41 RECURRENT MAJOR DEPRESSIVE DISORDER, IN PARTIAL REMISSION (HCC): ICD-10-CM

## 2020-01-03 DIAGNOSIS — Z13.6 SCREENING FOR CARDIOVASCULAR CONDITION: ICD-10-CM

## 2020-01-03 LAB
ALBUMIN SERPL BCP-MCNC: 3.6 G/DL (ref 3.5–5)
ALP SERPL-CCNC: 78 U/L (ref 46–116)
ALT SERPL W P-5'-P-CCNC: 42 U/L (ref 12–78)
ANION GAP SERPL CALCULATED.3IONS-SCNC: 6 MMOL/L (ref 4–13)
AST SERPL W P-5'-P-CCNC: 26 U/L (ref 5–45)
BASOPHILS # BLD AUTO: 0.04 THOUSANDS/ΜL (ref 0–0.1)
BASOPHILS NFR BLD AUTO: 1 % (ref 0–1)
BILIRUB SERPL-MCNC: 0.59 MG/DL (ref 0.2–1)
BUN SERPL-MCNC: 13 MG/DL (ref 5–25)
CALCIUM SERPL-MCNC: 8.7 MG/DL (ref 8.3–10.1)
CHLORIDE SERPL-SCNC: 108 MMOL/L (ref 100–108)
CHOLEST SERPL-MCNC: 209 MG/DL (ref 50–200)
CO2 SERPL-SCNC: 27 MMOL/L (ref 21–32)
CREAT SERPL-MCNC: 0.69 MG/DL (ref 0.6–1.3)
EOSINOPHIL # BLD AUTO: 0 THOUSAND/ΜL (ref 0–0.61)
EOSINOPHIL NFR BLD AUTO: 0 % (ref 0–6)
ERYTHROCYTE [DISTWIDTH] IN BLOOD BY AUTOMATED COUNT: 12.5 % (ref 11.6–15.1)
GFR SERPL CREATININE-BSD FRML MDRD: 103 ML/MIN/1.73SQ M
GLUCOSE P FAST SERPL-MCNC: 86 MG/DL (ref 65–99)
HCT VFR BLD AUTO: 39.6 % (ref 34.8–46.1)
HDLC SERPL-MCNC: 55 MG/DL
HGB BLD-MCNC: 12.9 G/DL (ref 11.5–15.4)
IMM GRANULOCYTES # BLD AUTO: 0.03 THOUSAND/UL (ref 0–0.2)
IMM GRANULOCYTES NFR BLD AUTO: 1 % (ref 0–2)
LAB AP GYN PRIMARY INTERPRETATION: NORMAL
LDLC SERPL CALC-MCNC: 137 MG/DL (ref 0–100)
LYMPHOCYTES # BLD AUTO: 1.65 THOUSANDS/ΜL (ref 0.6–4.47)
LYMPHOCYTES NFR BLD AUTO: 34 % (ref 14–44)
Lab: NORMAL
MCH RBC QN AUTO: 30.9 PG (ref 26.8–34.3)
MCHC RBC AUTO-ENTMCNC: 32.6 G/DL (ref 31.4–37.4)
MCV RBC AUTO: 95 FL (ref 82–98)
MONOCYTES # BLD AUTO: 0.46 THOUSAND/ΜL (ref 0.17–1.22)
MONOCYTES NFR BLD AUTO: 10 % (ref 4–12)
NEUTROPHILS # BLD AUTO: 2.61 THOUSANDS/ΜL (ref 1.85–7.62)
NEUTS SEG NFR BLD AUTO: 54 % (ref 43–75)
NONHDLC SERPL-MCNC: 154 MG/DL
NRBC BLD AUTO-RTO: 0 /100 WBCS
PLATELET # BLD AUTO: 187 THOUSANDS/UL (ref 149–390)
PMV BLD AUTO: 11.8 FL (ref 8.9–12.7)
POTASSIUM SERPL-SCNC: 3.9 MMOL/L (ref 3.5–5.3)
PROT SERPL-MCNC: 6.5 G/DL (ref 6.4–8.2)
RBC # BLD AUTO: 4.17 MILLION/UL (ref 3.81–5.12)
SODIUM SERPL-SCNC: 141 MMOL/L (ref 136–145)
TRIGL SERPL-MCNC: 84 MG/DL
TSH SERPL DL<=0.05 MIU/L-ACNC: 3.05 UIU/ML (ref 0.36–3.74)
WBC # BLD AUTO: 4.79 THOUSAND/UL (ref 4.31–10.16)

## 2020-01-03 PROCEDURE — 84443 ASSAY THYROID STIM HORMONE: CPT

## 2020-01-03 PROCEDURE — 85025 COMPLETE CBC W/AUTO DIFF WBC: CPT

## 2020-01-03 PROCEDURE — 80053 COMPREHEN METABOLIC PANEL: CPT

## 2020-01-03 PROCEDURE — 36415 COLL VENOUS BLD VENIPUNCTURE: CPT

## 2020-01-03 PROCEDURE — 80061 LIPID PANEL: CPT

## 2020-01-06 ENCOUNTER — TELEPHONE (OUTPATIENT)
Dept: INTERNAL MEDICINE CLINIC | Facility: CLINIC | Age: 50
End: 2020-01-06

## 2020-01-06 ENCOUNTER — OFFICE VISIT (OUTPATIENT)
Dept: INTERNAL MEDICINE CLINIC | Facility: CLINIC | Age: 50
End: 2020-01-06
Payer: COMMERCIAL

## 2020-01-06 VITALS
HEIGHT: 61 IN | DIASTOLIC BLOOD PRESSURE: 78 MMHG | RESPIRATION RATE: 12 BRPM | BODY MASS INDEX: 32.25 KG/M2 | SYSTOLIC BLOOD PRESSURE: 120 MMHG | TEMPERATURE: 97.8 F | WEIGHT: 170.8 LBS | HEART RATE: 68 BPM

## 2020-01-06 DIAGNOSIS — E78.2 MIXED HYPERLIPIDEMIA: ICD-10-CM

## 2020-01-06 DIAGNOSIS — Z23 NEED FOR TDAP VACCINATION: ICD-10-CM

## 2020-01-06 DIAGNOSIS — M32.9 SYSTEMIC LUPUS ERYTHEMATOSUS, UNSPECIFIED SLE TYPE, UNSPECIFIED ORGAN INVOLVEMENT STATUS (HCC): Primary | ICD-10-CM

## 2020-01-06 DIAGNOSIS — F33.41 RECURRENT MAJOR DEPRESSIVE DISORDER, IN PARTIAL REMISSION (HCC): ICD-10-CM

## 2020-01-06 PROBLEM — E55.9 VITAMIN D DEFICIENCY: Status: RESOLVED | Noted: 2017-06-26 | Resolved: 2020-01-06

## 2020-01-06 PROBLEM — R63.5 ABNORMAL WEIGHT GAIN: Status: RESOLVED | Noted: 2019-03-18 | Resolved: 2020-01-06

## 2020-01-06 PROBLEM — Z01.419 ROUTINE GYNECOLOGICAL EXAMINATION: Status: RESOLVED | Noted: 2019-12-30 | Resolved: 2020-01-06

## 2020-01-06 PROCEDURE — 1036F TOBACCO NON-USER: CPT | Performed by: NURSE PRACTITIONER

## 2020-01-06 PROCEDURE — 3008F BODY MASS INDEX DOCD: CPT | Performed by: NURSE PRACTITIONER

## 2020-01-06 PROCEDURE — 99214 OFFICE O/P EST MOD 30 MIN: CPT | Performed by: NURSE PRACTITIONER

## 2020-01-06 PROCEDURE — 90471 IMMUNIZATION ADMIN: CPT | Performed by: NURSE PRACTITIONER

## 2020-01-06 PROCEDURE — 90715 TDAP VACCINE 7 YRS/> IM: CPT | Performed by: NURSE PRACTITIONER

## 2020-01-06 RX ORDER — BUPROPION HYDROCHLORIDE 300 MG/1
300 TABLET ORAL DAILY
Qty: 90 TABLET | Refills: 1 | Status: SHIPPED | OUTPATIENT
Start: 2020-01-06 | End: 2020-07-06

## 2020-01-06 NOTE — ASSESSMENT & PLAN NOTE
Patient's cholesterol was elevated at 209 and her LDL   Was elevated  Her ASCVD risk is 1%  Information was provided to the patient regarding good fats versus bad fats and weight management

## 2020-01-06 NOTE — PATIENT INSTRUCTIONS
Good fat  Good fats come mainly from vegetables, nuts, seeds, and fish  They differ from saturated fats by having fewer hydrogen atoms bonded to their carbon chains  Healthy fats are liquid at room temperature, not solid  There are two broad categories of beneficial fats: monounsaturated and polyunsaturated fats  Monounsaturated fats  When you dip your bread in olive oil at an [place], you're getting mostly monounsaturated fat  Monounsaturated fats have a single carbon-to-carbon double bond  The result is that it has two fewer hydrogen atoms than a saturated fat and a bend at the double bond  This structure keeps monounsaturated fats liquid at room temperature  Good sources of monounsaturated fats are olive oil, peanut oil, canola oil, avocados, and most nuts, as well as high-oleic safflower and sunflower oils  The discovery that monounsaturated fat could be healthful came from the [place] Study during the 1960s  It revealed that people in [place] and other parts of the [facility] enjoyed a low rate of heart disease despite a high-fat diet  The main fat in their diet, though, was not the saturated animal fat common in countries with higher rates of heart disease  It was olive oil, which contains mainly monounsaturated fat  This finding produced a surge of interest in olive oil and the "Mediterranean diet," a style of eating regarded as a healthful choice today  Although there's no recommended daily intake of monounsaturated fats, the [place] of [facility] recommends using them as much as possible along with polyunsaturated fats to replace saturated and trans fats  Polyunsaturated fats  When you pour liquid cooking oil into a pan, there's a good chance you're using polyunsaturated fat  Corn oil, sunflower oil, and safflower oil are common examples  Polyunsaturated fats are essential fats  That means they're required for normal body functions but your body can't make them   So you must get them from food  Polyunsaturated fats are used to build cell membranes and the covering of nerves  They are needed for blood clotting, muscle movement, and inflammation  A polyunsaturated fat has two or more double bonds in its carbon chain  There are two main types of polyunsaturated fats: omega-3 fatty acids and omega-6 fatty acids  The numbers refer to the distance between the beginning of the carbon chain and the first double bond  Both types offer health benefits  Eating polyunsaturated fats in place of saturated fats or highly refined carbohydrates reduces harmful LDL cholesterol and improves the cholesterol profile  It also lowers triglycerides  Good sources of omega-3 fatty acids include fatty fish such as salmon, mackerel, and sardines, flaxseeds, walnuts, canola oil, and unhydrogenated soybean oil  Omega-3 fatty acids may help prevent and even treat heart disease and stroke  In addition to reducing blood pressure, raising HDL, and lowering triglycerides, polyunsaturated fats may help prevent lethal heart rhythms from arising  Evidence also suggests they may help reduce the need for corticosteroid medications in people with rheumatoid arthritis  Studies linking omega-3s to a wide range of other health improvements, including reducing risk of dementia, are inconclusive, and some of them have major flaws, according to a systematic review of the evidence by the Agency for Healthcare Research and Quality  Omega-6 fatty acids have also been linked to protection against heart disease  Foods rich in linoleic acid and other omega-6 fatty acids include vegetable oils such as safflower, soybean, sunflower, walnut, and corn oils  Cholesterol and Your Health   AMBULATORY CARE:   Cholesterol  is a waxy, fat-like substance  Cholesterol is made by your body, but also comes from certain foods you eat  Your body uses cholesterol to make hormones and new cells   Your body also uses cholesterol to protect nerves  Cholesterol comes from foods such as meat and dairy products  Your total cholesterol level is made up by LDL cholesterol, HDL cholesterol, and triglycerides:  · LDL cholesterol  is called bad cholesterol  because it forms plaque in your arteries  As plaque builds up, your arteries become narrow, and less blood flows through  When plaque decreases blood flow to your heart, you may have chest pain  If plaque completely blocks an artery that bring blood to your heart, you may have a heart attack  Plaque can break off and form blood clots  Blood clots may block arteries in your brain and cause a stroke  · HDL cholesterol  is called good cholesterol  because it helps remove LDL cholesterol from your arteries  It does this by attaching to LDL cholesterol and carrying it to your liver  Your liver breaks down LDL cholesterol so your body can get rid of it  High levels of HDL cholesterol can help prevent a heart attack and stroke  Low levels of HDL cholesterol can increase your risk for heart disease, heart attack, and stroke  · Triglycerides  are a type of fat that store energy from foods you eat  High levels of triglycerides also cause plaque buildup  This can increase your risk for a heart attack or stroke  If your triglyceride level is high, your LDL cholesterol level may also be high  How food affects your cholesterol levels:   · Unhealthy fats  increase LDL cholesterol and triglyceride levels in your blood  They are found in foods high in cholesterol, saturated fat, and trans fat:     ¨ Cholesterol  is found in eggs, dairy, and meat  ¨ Saturated fat  is found in butter, cheese, ice cream, whole milk, and coconut oil  Saturated fat is also found in meat, such as sausage, hot dogs, and bologna  ¨ Trans fat  is found in liquid oils and is used in fried and baked foods   Foods that contain trans fats include chips, crackers, muffins, sweet rolls, microwave popcorn, and cookies  · Healthy fats,  also called unsaturated fats, help lower LDL cholesterol and triglyceride levels  Healthy fats include the following:     ¨ Monounsaturated fats  are found in foods such as olive oil, canola oil, avocado, nuts, and olives  ¨ Polyunsaturated fats,  such as omega 3 fats, are found in fish, such as salmon, trout, and tuna  They can also be found in plant foods such as flaxseed, walnuts, and soybeans  Other things that affect your cholesterol levels:   · Smoking cigarettes    · Being overweight or obese     · Drinking large amounts of alcohol    · Not enough exercise or no exercise    · Certain genes passed from your parents to you  What you need to know about having your cholesterol levels checked: Adults 21to 39years of age should have their cholesterol levels checked every 4 to 6 years  Adults 45 years and older should have their cholesterol checked every 1 to 2 years  You may need your cholesterol checked more often, or at a younger age, if you have risk factors for heart disease  You may also need to have your cholesterol checked more often if you have other health conditions, such as diabetes  Blood tests are used to check cholesterol levels  Blood tests measure your levels of triglycerides, LDL cholesterol, and HDL cholesterol  Cholesterol level goals: Your cholesterol level goal may depend on your risk for heart disease  It may also depend on your age and other health conditions  Ask your healthcare provider if the following goals are right for you:  · Your total cholesterol level  should be less than 200 mg/dL  This number may also depend on your HDL and LDL cholesterol goals  · Your LDL cholesterol level  should be less than 130 mg/dL  · Your HDL cholesterol level  should be 60 mg/dL or higher  · Your triglyceride level  should be less than 150 mg/dL    Treatment for high cholesterol:  Treatment for high cholesterol will also decrease your risk of heart disease, heart attack, and stroke  Treatment may include any of the following:  · Medicines  may be given to lower your LDL cholesterol, triglyceride levels, or total cholesterol level  You may need medicines to lower your cholesterol if any of the following is true:     ¨ You have a history of stroke, TIA, unstable angina, or a heart attack    ¨ Your LDL cholesterol level is 190 mg/dL or higher    ¨ You are age 36to 76years of age, have diabetes, and your LDL cholesterol is 70 mg/dL or higher    ¨ You are age 36to 76years of age, have risk factors for heart disease, and your LDL cholesterol is 70 mg/dL or higher    · Lifestyle changes  include changes to your diet, exercise, weight loss, and quitting smoking  It also includes decreasing the amount of alcohol you drink  · Supplements  include fish oil, red yeast rice, and garlic  Fish oil may help lower your triglyceride and LDL cholesterol levels  It may also increase your HDL cholesterol level  Red yeast rice may help decrease your total cholesterol level and LDL cholesterol level  Garlic may help lower your total cholesterol level  Do not take these supplements without talking to your healthcare provider  Nutrition to help lower your cholesterol levels:  A registered dietitian can help you create a healthy eating plan  Read food labels and choose foods low in saturated fat, trans fats, and cholesterol  · Decrease the total amount of fat you eat  Choose lean meats, fat-free or 1% fat milk, and low-fat dairy products, such as yogurt and cheese  Try to limit or avoid red meats  Limit or do not eat fried foods or baked goods such as cookies  · Replace unhealthy fats with healthy fats  Cook foods in olive oil or canola oil  Choose soft margarines that are low in saturated fat and trans fat  Seeds, nuts, and avocados are other examples of healthy fats  · Eat foods with omega-3 fats  Examples include salmon, tuna, mackerel, walnuts, and flaxseed  Eat fish 2 times per week  Children and pregnant women should not eat fish that have high levels of mercury, such as shark, swordfish, and promise mackerel  · Increase the amount of plant-based foods you eat  Plant-based foods are low in cholesterol and fat  Eating more of these foods may help lower your cholesterol and help you lose weight  Examples of plant-based foods includes fruits, vegetables, legumes, and whole grains  Replace milk that contains dairy with almond, soy, or coconut milk  Eat beans and foods with soy for protein instead of meat  Ask your healthcare provider or dietitian for more information on plant-based foods  · Increase the amount of fiber you eat  High-fiber foods can help lower your LDL cholesterol  You should eat between 20 and 30 grams of fiber each day  Eat at least 5 servings of fruits and vegetables each day  Other examples of high-fiber foods include whole-grain or whole-wheat breads, pastas, or cereals, and brown rice  Eat 3 ounces of whole-grain foods each day  Increase fiber slowly  You may have abdominal discomfort, bloating, and gas if you add fiber to your diet too quickly  Lifestyle changes you can make to help lower your cholesterol levels:   · Maintain a healthy weight  Ask your healthcare provider how much you should weigh  Ask him or her to help you create a weight loss plan if you are overweight  Weight loss can decrease your total cholesterol and triglyceride levels  · Exercise regularly  Exercise can help lower your total cholesterol level and maintain a healthy weight  Exercise can also help increase your HDL cholesterol level  Work with your healthcare provider to create an exercise program that is right for you  Get at least 30 minutes of moderate exercise most days of the week  Examples of exercise include brisk walking, swimming, or biking  · Do not smoke    Nicotine and other chemicals in cigarettes and cigars can damage your lungs, heart, and blood vessels  They can also raise your triglyceride levels  Ask your healthcare provider for information if you currently smoke and need help to quit  E-cigarettes or smokeless tobacco still contain nicotine  Talk to your healthcare provider before you use these products  · Limit or do not drink alcohol  Alcohol can increase your triglyceride levels  Ask your healthcare provider if it is safe for you to drink alcohol  Also ask how much is safe for you to drink each day  © 2017 [address] [employer] Information is for End User's use only and may not be sold, redistributed or otherwise used for commercial purposes  All illustrations and images included in CareNotes® are the copyrighted property of A D A M [employer]  or [employer]  The above information is an  only  It is not intended as medical advice for individual conditions or treatments  Talk to your doctor, nurse or pharmacist before following any medical regimen to see if it is safe and effective for you  Weight Management   WHAT YOU NEED TO KNOW:   Being overweight increases your risk of health conditions such as heart disease, high blood pressure, type 2 diabetes, and certain types of cancer  It can also increase your risk for osteoarthritis, sleep apnea, and other respiratory problems  Aim for a slow, steady weight loss  Even a small amount of weight loss can lower your risk of health problems  DISCHARGE INSTRUCTIONS:   How to lose weight safely:  A safe and healthy way to lose weight is to eat fewer calories and get regular exercise  You can lose up about 1 pound a week by decreasing the number of calories you eat by 500 calories each day  You can decrease calories by eating smaller portion sizes or by cutting out high-calorie foods  Read labels to find out how many calories are in the foods you eat  You can also burn calories with exercise such as walking, swimming, or biking   You will be more likely to keep weight off if you make these changes part of your lifestyle  Healthy meal plan for weight management:  A healthy meal plan includes a variety of foods, contains fewer calories, and helps you stay healthy  A healthy meal plan includes the following:  · Eat whole-grain foods more often  A healthy meal plan should contain fiber  Fiber is the part of grains, fruits, and vegetables that is not broken down by your body  Whole-grain foods are healthy and provide extra fiber in your diet  Some examples of whole-grain foods are whole-wheat breads and pastas, oatmeal, brown rice, and bulgur  · Eat a variety of vegetables every day  Include dark, leafy greens such as spinach, kale, hector greens, and mustard greens  Eat yellow and orange vegetables such as carrots, sweet potatoes, and winter squash  · Eat a variety of fruits every day  Choose fresh or canned fruit (canned in its own juice or light syrup) instead of juice  Fruit juice has very little or no fiber  · Eat low-fat dairy foods  Drink fat-free (skim) milk or 1% milk  Eat fat-free yogurt and low-fat cottage cheese  Try low-fat cheeses such as mozzarella and other reduced-fat cheeses  · Choose meat and other protein foods that are low in fat  Choose beans or other legumes such as split peas or lentils  Choose fish, skinless poultry (chicken or turkey), or lean cuts of red meat (beef or pork)  Before you cook meat or poultry, cut off any visible fat  · Use less fat and oil  Try baking foods instead of frying them  Add less fat, such as margarine, sour cream, regular salad dressing, and mayonnaise to foods  Eat fewer high-fat foods  Some examples of high-fat foods include french fries, doughnuts, ice cream, and cakes  · Eat fewer sweets  Limit foods and drinks that are high in sugar  This includes candy, cookies, regular soda, and sweetened drinks  Ways to decrease calories:   · Eat smaller portions  ¨ Use a small plate with smaller servings      ¨ Do not eat second helpings  ¨ When you eat at a restaurant, ask for a box and place half of your meal in the box before you eat  ¨ Share an entrée with someone else  · Replace high-calorie snacks with healthy, low-calorie snacks  ¨ Choose fresh fruit, vegetables, fat-free rice cakes, or air-popped popcorn instead of potato chips, nuts, or chocolate  ¨ Choose water or calorie-free drinks instead of soda or sweetened drinks  · Eat regular meals  Skipping meals can lead to overeating later in the day  Eat a healthy snack in place of a meal if you do not have time to eat a regular meal      · Do not shop for groceries when you are hungry  You may be more likely to make unhealthy food choices  Take a grocery list of healthy foods and shop after you have eaten  Exercise:  Exercise at least 30 minutes per day on most days of the week  Some examples of exercise include walking, biking, dancing, and swimming  You can also fit in more physical activity by taking the stairs instead of the elevator or parking farther away from stores  Ask your healthcare provider about the best exercise plan for you  Other things to consider as you try to lose weight:   · Be aware of situations that may give you the urge to overeat, such as eating while watching television  Find ways to avoid these situations  For example, read a book, go for a walk, or do crafts  · Meet with a weight loss support group or friends who are also trying to lose weight  This may help you stay motivated to continue working on your weight loss goals  © 2017 [address] [employer] Information is for End User's use only and may not be sold, redistributed or otherwise used for commercial purposes  All illustrations and images included in CareNotes® are the copyrighted property of [employer]  or [employer]  The above information is an  only   It is not intended as medical advice for individual conditions or treatments  Talk to your doctor, nurse or pharmacist before following any medical regimen to see if it is safe and effective for you

## 2020-01-06 NOTE — PROGRESS NOTES
INTERNAL MEDICINE FOLLOW-UP OFFICE VISIT  [facility]    NAME: Nisreen Galeana  AGE: 52 y o  SEX: female    DATE OF ENCOUNTER: 1/6/2020   Assessment and Plan:     Problem List Items Addressed This Visit        Other    Recurrent major depressive disorder, in partial remission ([facility] [address] )       Patient has  Been on Wellbutrin 300 mg daily  Last month she decided to see if she needs this medication and stopped the Wellbutrin for 2 weeks  She has gone back on this medication  She feels that it is helping her anxiety and depression  Refill sent to her pharmacy  Relevant Medications    buPROPion (WELLBUTRIN XL) 300 mg 24 hr tablet    Systemic lupus erythematosus ([facility] [address] ) - Primary       Currently follows with Rheumatology for her lupus  Mixed hyperlipidemia       Patient's cholesterol was elevated at 209 and her LDL   Was elevated  Her ASCVD risk is 1%  Information was provided to the patient regarding good fats versus bad fats and weight management  Other Visit Diagnoses     Need for Tdap vaccination        Relevant Orders    TDAP VACCINE GREATER THAN OR EQUAL TO 8YO IM (Completed)          No follow-ups on file  Counseling:     · Medication Side Effects - Adverse side effects of medications were reviewed with the patient/guardian today: Yes  · Counseling was given regarding: Intructions for management  · Barriers to treatment include: No identified barriers      Chief Complaint:     Chief Complaint   Patient presents with    Follow-up     medication refill  History of Present Illness:     LISA Syed presents to the office today for follow-up  She is a 42-year-old patient of Dr Brionna grant with a past medical history of bad anxiety, depression, hyperlipidemia and lupus  Patient does follow with Rheumatology for her lupus  The patient has been on Wellbutrin 300 mg daily    She decided to stop the medication in December to see if she still needed it and has since gone back on this medication  A refill was sent to her pharmacy  The patient recently had blood work performed and her cholesterol and LDL were elevated  Her ASCVD risk is 1%  Information was provided to the patient regarding good fats as well as weight management  Her BMI was 32  The patient will see Dr Ga Score back in 6 months  The following portions of the patient's history were reviewed and updated as appropriate: allergies, current medications, past family history, past medical history, past social history, past surgical history and problem list      Review of Systems:     Review of Systems   Constitutional: Negative  HENT: Negative  Eyes: Positive for visual disturbance (glasses and contacts)  Respiratory: Negative  Cardiovascular: Negative  Gastrointestinal: Negative  Endocrine: Negative  Genitourinary: Negative  Musculoskeletal: Negative  Skin: Negative  Neurological: Negative  Hematological: Negative  Psychiatric/Behavioral: Negative  Problem List:     Patient Active Problem List   Diagnosis    [facility]    Breast lump    Hypercholesteremia    Obesity, Class I, BMI 30-34 9    Systemic lupus erythematosus (HCC)    Vitamin D deficiency    Anxiety    Abnormal weight gain    Routine gynecological examination        Objective:     /78 (BP Location: Left arm, Patient Position: Sitting, Cuff Size: Standard)   Pulse 68   Temp 97 8 °F (36 6 °C) (Oral)   Resp 12   Ht 5' 1" (1 549 m)   Wt 77 5 kg (170 lb 12 8 oz)   BMI 32 27 kg/m²     Physical Exam   Constitutional: She is oriented to person, place, and time  She appears well-developed and well-nourished  No distress  HENT:   Head: Normocephalic and atraumatic  Eyes: Pupils are equal, round, and reactive to light  Conjunctivae and EOM are normal  Right eye exhibits no discharge   Left eye exhibits no discharge  Neck: Normal range of motion  No thyromegaly present  Cardiovascular: Normal rate, regular rhythm, normal heart sounds and intact distal pulses  No murmur heard  Pulmonary/Chest: Effort normal and breath sounds normal  No respiratory distress  Musculoskeletal: She exhibits no edema  Lymphadenopathy:     She has no cervical adenopathy  Neurological: She is alert and oriented to person, place, and time  Skin: Skin is warm and dry  Psychiatric: She has a normal mood and affect  Her behavior is normal  Judgment and thought content normal      BMI Counseling: Body mass index is 32 27 kg/m²  The BMI is above normal  Nutrition recommendations include decreasing portion sizes, decreasing fast food intake, consuming healthier snacks, limiting drinks that contain sugar, moderation in carbohydrate intake and increasing intake of lean protein  Exercise recommendations include moderate physical activity 150 minutes/week  Pertinent Laboratory/Diagnostic Studies:    Laboratory Results: I have personally reviewed the pertinent laboratory results/reports   Radiology/Other Diagnostic Testing Results: I have personally reviewed pertinent reports  Current Medications:     Current Outpatient Medications   Medication Sig Dispense Refill    buPROPion (WELLBUTRIN XL) 300 mg 24 hr tablet TAKE 1 TABLET BY MOUTH EVERY DAY 90 tablet 1    aspirin 81 mg chewable tablet Chew 81 mg daily       No current facility-administered medications for this visit  There are no Patient Instructions on file for this visit      ANDREA Marcelo  [facility] [address] [place] [address]

## 2020-01-06 NOTE — ASSESSMENT & PLAN NOTE
Patient has  Been on Wellbutrin 300 mg daily  Last month she decided to see if she needs this medication and stopped the Wellbutrin for 2 weeks  She has gone back on this medication  She feels that it is helping her anxiety and depression  Refill sent to her pharmacy

## 2020-01-07 NOTE — TELEPHONE ENCOUNTER
Re:   Lab results  Provider's message/results/instructions relayed in full detail  LVM asking pt to return call w/any questions

## 2020-01-07 NOTE — TELEPHONE ENCOUNTER
----- Message from Gwen Sebastian MD sent at 1/6/2020  7:00 PM EST -----  Labs ok, please call and inform patient

## 2020-01-09 ENCOUNTER — TELEPHONE (OUTPATIENT)
Dept: OBGYN CLINIC | Facility: CLINIC | Age: 50
End: 2020-01-09

## 2020-01-09 NOTE — TELEPHONE ENCOUNTER
Left message for Maximo Sr her of results per Dr Natasha Márquez  ----- Message from Emiliana Armijo MD sent at 1/7/2020  2:48 PM EST -----  Please call prasanth pap and hpv are negative

## 2020-02-14 ENCOUNTER — TELEPHONE (OUTPATIENT)
Dept: MAMMOGRAPHY | Facility: CLINIC | Age: 50
End: 2020-02-14

## 2020-02-18 ENCOUNTER — HOSPITAL ENCOUNTER (OUTPATIENT)
Dept: MAMMOGRAPHY | Facility: CLINIC | Age: 50
Discharge: HOME | End: 2020-02-18
Payer: COMMERCIAL

## 2020-02-18 VITALS — HEIGHT: 61 IN | WEIGHT: 170 LBS | BODY MASS INDEX: 32.1 KG/M2

## 2020-02-18 DIAGNOSIS — Z12.31 ENCOUNTER FOR SCREENING MAMMOGRAM FOR MALIGNANT NEOPLASM OF BREAST: ICD-10-CM

## 2020-02-18 PROCEDURE — 77067 SCR MAMMO BI INCL CAD: CPT

## 2020-02-18 PROCEDURE — 77063 BREAST TOMOSYNTHESIS BI: CPT

## 2020-02-26 ENCOUNTER — TELEPHONE (OUTPATIENT)
Dept: OBGYN CLINIC | Facility: CLINIC | Age: 50
End: 2020-02-26

## 2020-02-26 DIAGNOSIS — R92.8 ABNORMAL MAMMOGRAM: Primary | ICD-10-CM

## 2020-02-26 NOTE — TELEPHONE ENCOUNTER
----- Message from Sheba Fernandez MD sent at 2/26/2020  9:44 AM EST -----  Pleaes call prasanth - mammogram has been reviewed   Need additional imaging left

## 2020-02-26 NOTE — TELEPHONE ENCOUNTER
LMOM today @ [phone] for Pt to call back office regarding Radiology's recommendation of additional imaging of Pt's left breast   Radiology orders for US breast left limited & Mammo diagnostic left w 3d & cad completed in Epic today, mailed to Pt's mailing address today in Pt's EHR

## 2020-03-03 ENCOUNTER — HOSPITAL ENCOUNTER (OUTPATIENT)
Dept: MAMMOGRAPHY | Facility: CLINIC | Age: 50
Discharge: HOME | End: 2020-03-03
Payer: COMMERCIAL

## 2020-03-03 ENCOUNTER — HOSPITAL ENCOUNTER (OUTPATIENT)
Dept: ULTRASOUND IMAGING | Facility: CLINIC | Age: 50
Discharge: HOME | End: 2020-03-03
Payer: COMMERCIAL

## 2020-03-03 DIAGNOSIS — R92.8 ABNORMAL MAMMOGRAM: ICD-10-CM

## 2020-03-03 PROCEDURE — 77065 DX MAMMO INCL CAD UNI: CPT

## 2020-03-03 PROCEDURE — G0279 TOMOSYNTHESIS, MAMMO: HCPCS

## 2020-03-03 PROCEDURE — 76642 ULTRASOUND BREAST LIMITED: CPT

## 2020-03-04 ENCOUNTER — TELEPHONE (OUTPATIENT)
Dept: INTERNAL MEDICINE CLINIC | Facility: CLINIC | Age: 50
End: 2020-03-04

## 2020-03-04 DIAGNOSIS — R39.9 UTI SYMPTOMS: Primary | ICD-10-CM

## 2020-03-04 DIAGNOSIS — R39.9 UTI SYMPTOMS: ICD-10-CM

## 2020-03-04 RX ORDER — CIPROFLOXACIN 500 MG/1
500 TABLET, FILM COATED ORAL EVERY 12 HOURS SCHEDULED
Qty: 14 TABLET | Refills: 0 | Status: SHIPPED | OUTPATIENT
Start: 2020-03-04 | End: 2020-03-11

## 2020-03-04 RX ORDER — CIPROFLOXACIN 500 MG/1
500 TABLET, FILM COATED ORAL EVERY 12 HOURS SCHEDULED
Qty: 14 TABLET | Refills: 0 | Status: SHIPPED | OUTPATIENT
Start: 2020-03-04 | End: 2020-03-04 | Stop reason: SDUPTHER

## 2020-03-04 NOTE — TELEPHONE ENCOUNTER
Patient called in- she has had UTI's in the past and she has all the symptoms of one now  She's out of the area and is wondering if Dr Lynne Cabello would call a script in for her? She said the last set of labs she had done showed signs of one      Symptoms: burning, discomfort and urgency yesterday ( she dialed back on fluid intake and increased salt intake today to help dial back the urgency )    Pharmacy: [facility] in [facility]  Patient's Ph #[phone]

## 2020-03-25 ENCOUNTER — TELEPHONE (OUTPATIENT)
Dept: INTERNAL MEDICINE CLINIC | Facility: CLINIC | Age: 50
End: 2020-03-25

## 2020-03-25 ENCOUNTER — TELEMEDICINE (OUTPATIENT)
Dept: INTERNAL MEDICINE CLINIC | Facility: CLINIC | Age: 50
End: 2020-03-25
Payer: COMMERCIAL

## 2020-03-25 DIAGNOSIS — Z20.828 EXPOSURE TO SARS-ASSOCIATED CORONAVIRUS: Primary | ICD-10-CM

## 2020-03-25 PROCEDURE — 99213 OFFICE O/P EST LOW 20 MIN: CPT | Performed by: INTERNAL MEDICINE

## 2020-03-25 NOTE — PROGRESS NOTES
COVID-19 Virtual Visit     This virtual check-in was done via [employer] and patient was informed that this is not a secure, HIPAA-complaint platform  She agrees to proceed  Encounter provider Patricia Valadez DO    Provider located at [address] [place] [address]    Recent Visits  No visits were found meeting these conditions  Showing recent visits within past 7 days and meeting all other requirements     Today's Visits  Date Type Provider Dept   03/25/20 Telemedicine Keith Alegre   03/25/20 Telephone Anila Jacome  [facility] today's visits and meeting all other requirements     Future Appointments  Date Type Provider Dept   03/25/20 Telemedicine Keith Alegre   Showing future appointments within next 150 days and meeting all other requirements      Patient agrees to participate in a virtual check in via telephone or video visit instead of presenting to the office to address urgent/immediate medical needs  Patient is aware this is a billable service  After connecting through televideo, the patient was identified by name and date of birth  Gregorio Cuadra was informed that this was a telemedicine visit and that the exam was being conducted confidentially over secure lines  My office door was closed  No one else was in the room  Gregorio Cuadra acknowledged consent and understanding of privacy and security of the telemedicine visit  I informed the patient that I have reviewed her record in Epic and presented the opportunity for her to ask any questions regarding the visit today  The patient agreed to participate  Subjective: Gregorio Cuadra is a 52 y o  female who is concerned about COVID-19  She reports fever, cough and chest tightness  She has not traveled outside the [place]  within the last 14 days    She has not had contact with a person who is under investigation for or who is positive for COVID-19 within the last 14 days  She has not been hospitalized recently for fever and/or lower respiratory symptoms  Her symptoms started last night with chest tightness  At around 3 AM she woke up in a sweat  Her Tmax today has been 101 6  She has not taken any tylenol or NSAIDs  She also admits to a headache  No loss of smell  Patient Active Problem List   Diagnosis    Recurrent major depressive disorder, in partial remission ([facility] )    Breast lump    Hypercholesteremia    Obesity, Class I, BMI 30-34 9    Systemic lupus erythematosus ([facility] )    Anxiety    Mixed hyperlipidemia      Past Surgical History:   Procedure Laterality Date    APPENDECTOMY      BREAST BIOPSY Left 2018    benign    CARPAL TUNNEL RELEASE       SECTION      x2    KNEE ARTHROSCOPY Right     MYOMECTOMY      MYOMECTOMY      TUBAL LIGATION        Social History     Occupational History    Occupation: Teacher     Employer: [address] [place] [address]   Tobacco Use    Smoking status: Never Smoker    Smokeless tobacco: Never Used   Substance and Sexual Activity    Alcohol use: Yes     Frequency: 2-4 times a month     Comment: Minimal     Drug use: Never    Sexual activity: Not Currently      Current Outpatient Medications   Medication Sig Dispense Refill    aspirin 81 mg chewable tablet Chew 81 mg daily      buPROPion (WELLBUTRIN XL) 300 mg 24 hr tablet Take 1 tablet (300 mg total) by mouth daily 90 tablet 1     No current facility-administered medications for this visit        Allergies   Allergen Reactions    Acetaminophen-Codeine Hives    Codeine Sulfate Hives    Hydrocodone Hives    Iodinated Diagnostic Agents Rash, Vomiting and Hives    Iodine Rash and Vomiting    Morphine Rash    Oxycodone Rash     Video Exam:    [facility] appears to be tired and congested  She is not exhibiting signs of respiratory distress or increased work of breathing  No evidence of severe abdominal pain or abdominal bloating  She is answering questions clearly; she is alert and oriented x3     Disposition:      I referred Kaley Thompson to one of our centralized sites for a COVID-19 swab  She will get testing in the morning  Discussed signs/symptoms that I have been seeing with COVID + patients thus far  Discussed quarantining her whole family at this time  Discussed usual time to get test back at this moment  Asked her to call in on Friday to check in  Orders Placed This Encounter   Procedures    MISC COVID-19 TEST- Collected at [facility]      I spent 12 minutes with the patient during this virtual check-in visit      Jodi Grace,

## 2020-03-25 NOTE — TELEPHONE ENCOUNTER
Patient called stating that she has a fever of 101 6, a cough, headache, chest pain when she breathes(kind of like a bronchitis breath) but not SOB  She has not been exposed, or traveled anywhere in the last month  She does have lupus and she is concerned       Marilou's #  [phone]

## 2020-03-26 DIAGNOSIS — Z20.828 EXPOSURE TO SARS-ASSOCIATED CORONAVIRUS: ICD-10-CM

## 2020-03-26 PROCEDURE — 87635 SARS-COV-2 COVID-19 AMP PRB: CPT

## 2020-03-26 PROCEDURE — U0002 COVID-19 LAB TEST NON-CDC: HCPCS

## 2020-03-31 ENCOUNTER — TELEPHONE (OUTPATIENT)
Dept: INTERNAL MEDICINE CLINIC | Facility: CLINIC | Age: 50
End: 2020-03-31

## 2020-03-31 LAB — SARS-COV-2 RNA SPEC QL NAA+PROBE: NOT DETECTED

## 2020-03-31 NOTE — TELEPHONE ENCOUNTER
----- Message from Andrew Brar DO sent at 3/31/2020  2:08 PM EDT -----  Call patient and let her know COVID-19 testing was negative

## 2020-07-06 DIAGNOSIS — F33.41 RECURRENT MAJOR DEPRESSIVE DISORDER, IN PARTIAL REMISSION (HCC): ICD-10-CM

## 2020-07-06 RX ORDER — BUPROPION HYDROCHLORIDE 300 MG/1
TABLET ORAL
Qty: 90 TABLET | Refills: 1 | Status: SHIPPED | OUTPATIENT
Start: 2020-07-06 | End: 2021-01-05

## 2020-07-07 ENCOUNTER — TELEPHONE (OUTPATIENT)
Dept: INTERNAL MEDICINE CLINIC | Facility: CLINIC | Age: 50
End: 2020-07-07

## 2020-07-07 DIAGNOSIS — E78.2 MIXED HYPERLIPIDEMIA: Primary | ICD-10-CM

## 2020-07-07 NOTE — TELEPHONE ENCOUNTER
Patient has an appt Friday & is asking if  wants her to have labs done prior    ??  Please call patient & let her know    Stefani Rawls    In case she has to come in & get them done she can do that tomorrow  Ricardo Manifold

## 2020-07-09 ENCOUNTER — APPOINTMENT (OUTPATIENT)
Dept: LAB | Facility: CLINIC | Age: 50
End: 2020-07-09
Payer: COMMERCIAL

## 2020-07-09 ENCOUNTER — TELEPHONE (OUTPATIENT)
Dept: INTERNAL MEDICINE CLINIC | Facility: CLINIC | Age: 50
End: 2020-07-09

## 2020-07-09 DIAGNOSIS — E78.2 MIXED HYPERLIPIDEMIA: ICD-10-CM

## 2020-07-09 LAB
ALBUMIN SERPL BCP-MCNC: 3.6 G/DL (ref 3.5–5)
ALP SERPL-CCNC: 82 U/L (ref 46–116)
ALT SERPL W P-5'-P-CCNC: 23 U/L (ref 12–78)
ANION GAP SERPL CALCULATED.3IONS-SCNC: 4 MMOL/L (ref 4–13)
AST SERPL W P-5'-P-CCNC: 16 U/L (ref 5–45)
BASOPHILS # BLD AUTO: 0.04 THOUSANDS/ΜL (ref 0–0.1)
BASOPHILS NFR BLD AUTO: 1 % (ref 0–1)
BILIRUB SERPL-MCNC: 0.46 MG/DL (ref 0.2–1)
BUN SERPL-MCNC: 17 MG/DL (ref 5–25)
CALCIUM SERPL-MCNC: 9.3 MG/DL (ref 8.3–10.1)
CHLORIDE SERPL-SCNC: 110 MMOL/L (ref 100–108)
CHOLEST SERPL-MCNC: 255 MG/DL (ref 50–200)
CO2 SERPL-SCNC: 29 MMOL/L (ref 21–32)
CREAT SERPL-MCNC: 0.72 MG/DL (ref 0.6–1.3)
EOSINOPHIL # BLD AUTO: 0 THOUSAND/ΜL (ref 0–0.61)
EOSINOPHIL NFR BLD AUTO: 0 % (ref 0–6)
ERYTHROCYTE [DISTWIDTH] IN BLOOD BY AUTOMATED COUNT: 12.5 % (ref 11.6–15.1)
GFR SERPL CREATININE-BSD FRML MDRD: 98 ML/MIN/1.73SQ M
GLUCOSE P FAST SERPL-MCNC: 83 MG/DL (ref 65–99)
HCT VFR BLD AUTO: 41.1 % (ref 34.8–46.1)
HDLC SERPL-MCNC: 61 MG/DL
HGB BLD-MCNC: 13.2 G/DL (ref 11.5–15.4)
IMM GRANULOCYTES # BLD AUTO: 0.02 THOUSAND/UL (ref 0–0.2)
IMM GRANULOCYTES NFR BLD AUTO: 1 % (ref 0–2)
LDLC SERPL CALC-MCNC: 175 MG/DL (ref 0–100)
LYMPHOCYTES # BLD AUTO: 1.7 THOUSANDS/ΜL (ref 0.6–4.47)
LYMPHOCYTES NFR BLD AUTO: 40 % (ref 14–44)
MCH RBC QN AUTO: 31.5 PG (ref 26.8–34.3)
MCHC RBC AUTO-ENTMCNC: 32.1 G/DL (ref 31.4–37.4)
MCV RBC AUTO: 98 FL (ref 82–98)
MONOCYTES # BLD AUTO: 0.39 THOUSAND/ΜL (ref 0.17–1.22)
MONOCYTES NFR BLD AUTO: 9 % (ref 4–12)
NEUTROPHILS # BLD AUTO: 2.09 THOUSANDS/ΜL (ref 1.85–7.62)
NEUTS SEG NFR BLD AUTO: 49 % (ref 43–75)
NONHDLC SERPL-MCNC: 194 MG/DL
NRBC BLD AUTO-RTO: 0 /100 WBCS
PLATELET # BLD AUTO: 189 THOUSANDS/UL (ref 149–390)
PMV BLD AUTO: 12.4 FL (ref 8.9–12.7)
POTASSIUM SERPL-SCNC: 4.6 MMOL/L (ref 3.5–5.3)
PROT SERPL-MCNC: 6.7 G/DL (ref 6.4–8.2)
RBC # BLD AUTO: 4.19 MILLION/UL (ref 3.81–5.12)
SODIUM SERPL-SCNC: 143 MMOL/L (ref 136–145)
TRIGL SERPL-MCNC: 97 MG/DL
TSH SERPL DL<=0.05 MIU/L-ACNC: 2.6 UIU/ML (ref 0.36–3.74)
WBC # BLD AUTO: 4.24 THOUSAND/UL (ref 4.31–10.16)

## 2020-07-09 PROCEDURE — 80061 LIPID PANEL: CPT

## 2020-07-09 PROCEDURE — 84443 ASSAY THYROID STIM HORMONE: CPT

## 2020-07-09 PROCEDURE — 80053 COMPREHEN METABOLIC PANEL: CPT

## 2020-07-09 PROCEDURE — 36415 COLL VENOUS BLD VENIPUNCTURE: CPT

## 2020-07-09 PROCEDURE — 85025 COMPLETE CBC W/AUTO DIFF WBC: CPT

## 2020-07-09 RX ORDER — GRAPE SEED EXT/BIOFLAV,CITRUS 50MG-250MG
CAPSULE ORAL
COMMUNITY

## 2020-07-09 NOTE — TELEPHONE ENCOUNTER
1  Do you presently have a fever or flu-like symptoms? No  2  Do you have symptoms of an upper respiratory infection like runny nose, sore throat, or cough? No  3  Are you suffering from new headache that you have not had in the past?  No  4  Do you have/have y22ou experienced any new shortness of breath recently? No  5  Do you have any new diarrhea, nausea or vomiting? No  6  Have you been in contact with anyone who has been sick or diagnosed with COVID-19?  No  SEEING  [facility]  Friday 7/10/20

## 2020-07-10 ENCOUNTER — TELEPHONE (OUTPATIENT)
Dept: INTERNAL MEDICINE CLINIC | Facility: CLINIC | Age: 50
End: 2020-07-10

## 2020-07-10 ENCOUNTER — OFFICE VISIT (OUTPATIENT)
Dept: INTERNAL MEDICINE CLINIC | Facility: CLINIC | Age: 50
End: 2020-07-10
Payer: COMMERCIAL

## 2020-07-10 VITALS
WEIGHT: 168 LBS | SYSTOLIC BLOOD PRESSURE: 104 MMHG | TEMPERATURE: 98.4 F | DIASTOLIC BLOOD PRESSURE: 78 MMHG | HEIGHT: 61 IN | HEART RATE: 70 BPM | OXYGEN SATURATION: 98 % | BODY MASS INDEX: 31.72 KG/M2

## 2020-07-10 DIAGNOSIS — F41.9 ANXIETY: ICD-10-CM

## 2020-07-10 DIAGNOSIS — M32.9 SYSTEMIC LUPUS ERYTHEMATOSUS, UNSPECIFIED SLE TYPE, UNSPECIFIED ORGAN INVOLVEMENT STATUS (HCC): ICD-10-CM

## 2020-07-10 DIAGNOSIS — F33.41 RECURRENT MAJOR DEPRESSIVE DISORDER, IN PARTIAL REMISSION (HCC): ICD-10-CM

## 2020-07-10 DIAGNOSIS — E78.2 MIXED HYPERLIPIDEMIA: Primary | ICD-10-CM

## 2020-07-10 PROCEDURE — 1036F TOBACCO NON-USER: CPT | Performed by: INTERNAL MEDICINE

## 2020-07-10 PROCEDURE — 99214 OFFICE O/P EST MOD 30 MIN: CPT | Performed by: INTERNAL MEDICINE

## 2020-07-10 PROCEDURE — 3008F BODY MASS INDEX DOCD: CPT | Performed by: INTERNAL MEDICINE

## 2020-07-10 NOTE — PROGRESS NOTES
INTERNAL MEDICINE OFFICE VISIT  [facility] [place]  [address] [facility]  Tel: [phone]      NAME: Fredi Arellano  AGE: 48 y o  SEX: female  : 1970   MRN: [id number]    DATE: 7/10/2020  TIME: 8:19 AM      Assessment and Plan:  1  Mixed hyperlipidemia   patient was advised to start medication as her LDL was 175 but she refuses to take medication at present  She wants to try the diet for another 4 months  I will repeat labs in 4 months and get back to her  - CBC and differential; Future  - Comprehensive metabolic panel; Future  - Lipid panel; Future  - TSH, 3rd generation; Future    2  Recurrent major depressive disorder, in partial remission (HCC)  Continue Wellbutrin    3  Anxiety   continue Wellbutrin    4  Systemic lupus erythematosus, unspecified SLE type, unspecified organ involvement status ([facility] )   follow up with Rheumatology  She has started taking the medical marijuana which is also helping her with the anxiety      - Counseling Documentation: patient was counseled regarding: diagnostic results, instructions for management, risk factor reductions, prognosis, patient and family education, risks and benefits of treatment options and importance of compliance with treatment  - Medication Side Effects: Adverse side effects of medications were reviewed with the patient/guardian today  Return for follow up visit in  4 months or earlier, if needed  Chief Complaint:  Chief Complaint   Patient presents with    Follow-up     6 month follow up         History of Present Illness:    hyperlipidemia- her cholesterol is very high with the total cholesterol at 255 and LDL at 175  She is not on medication  Depression and anxiety -has been taking the Wellbutrin and her symptoms are better    She also says that the medical marijuana is helping her with her symptoms  SLE-follows up with Rheumatology      Active Problem List:  Patient Active Problem List   Diagnosis    Recurrent major depressive disorder, in partial remission (HCC)    Breast lump    Hypercholesteremia    Obesity, Class I, BMI 30-34 9    Systemic lupus erythematosus ([facility] )    Anxiety    Mixed hyperlipidemia         Past Medical History:  Past Medical History:   Diagnosis Date    Anxiety     Chronic pain of right knee 2016    Last assessed     Depression     Lupus ([facility] )     Migrainous headache without aura 2016    Last assessed    Rheumatoid arthritis ([facility] )          Past Surgical History:  Past Surgical History:   Procedure Laterality Date    APPENDECTOMY      BREAST BIOPSY Left 2018    benign    CARPAL TUNNEL RELEASE       SECTION      x2    KNEE ARTHROSCOPY Right     MYOMECTOMY      MYOMECTOMY      TUBAL LIGATION  2009         Family History:  Family History   Problem Relation Age of Onset    Other Mother         Back disorder    Clifm Armaan Spina bifida Mother    Clifm Armaan Diabetes Father     Hypertension Father     Other Sister         Back disorder     Cancer Paternal Grandmother         lymphoma     Heart disease Neg Hx     Stroke Neg Hx     Thyroid disease Neg Hx          Social History:  Social History     Socioeconomic History    Marital status: /Civil Union     Spouse name: None    Number of children: None    Years of education: None    Highest education level: None   Occupational History    Occupation: Teacher     Employer: [employer] [address] [employer]   Social Needs    Financial resource strain: None    Food insecurity:     Worry: None     Inability: None    Transportation needs:     Medical: None     Non-medical: None   Tobacco Use    Smoking status: Never Smoker    Smokeless tobacco: Never Used   Substance and Sexual Activity    Alcohol use: Yes     Frequency: 2-4 times a month     Comment: Minimal     Drug use: Yes     Types: Marijuana    Sexual activity: Not Currently   Lifestyle    Physical activity:     Days per week: 5 days     Minutes per session: 50 min    Stress: Not at all   Relationships    Social connections:     Talks on phone: None     Gets together: None     Attends Gnosticist service: None     Active member of club or organization: None     Attends meetings of clubs or organizations: None     Relationship status: None    Intimate partner violence:     Fear of current or ex partner: None     Emotionally abused: None     Physically abused: None     Forced sexual activity: None   Other Topics Concern    None   Social History Narrative    None         Allergies:   Allergies   Allergen Reactions    Acetaminophen-Codeine Hives    Codeine Sulfate Hives    Hydrocodone Hives    Iodinated Diagnostic Agents Rash, Vomiting and Hives    Iodine Rash and Vomiting    Morphine Rash    Oxycodone Rash         Medications:    Current Outpatient Medications:     Black Cohosh 540 MG CAPS, Take by mouth, Disp: , Rfl:     buPROPion (WELLBUTRIN XL) 300 mg 24 hr tablet, TAKE 1 TABLET BY MOUTH EVERY DAY, Disp: 90 tablet, Rfl: 1    ergocalciferol (VITAMIN D2) 50,000 units, Take 50,000 Units by mouth once a week, Disp: , Rfl:     GLUCOSA-CHONDR-NA CHONDR-MSM PO, Take by mouth, Disp: , Rfl:     Turmeric Curcumin 500 MG CAPS, Take by mouth, Disp: , Rfl:     aspirin 81 mg chewable tablet, Chew 81 mg daily, Disp: , Rfl:       The following portions of the patient's history were reviewed and updated as appropriate: past medical history, past surgical history, family history, social history, allergies, current medications and active problem list       Review of Systems:  Constitutional: Denies fever, chills, weight gain, weight loss, fatigue  Eyes: Denies eye redness, eye discharge, double vision, change in visual acuity  ENT: Denies hearing loss, tinnitus, sneezing, nasal congestion, nasal discharge, sore throat   Respiratory: Denies cough, expectoration, hemoptysis, shortness of breath, wheezing  Cardiovascular: Denies chest pain, palpitations, lower extremity swelling, orthopnea, PND  Gastrointestinal: Denies abdominal pain, heartburn, nausea, vomiting, hematemesis, diarrhea, bloody stools  Genito-Urinary: Denies dysuria, frequency, difficulty in micturition, nocturia, incontinence  Musculoskeletal: Denies back pain, joint pain, muscle pain  Neurologic: Denies confusion, lightheadedness, syncope, headache, focal weakness, sensory changes, seizures  Endocrine: Denies polyuria, polydipsia, temperature intolerance  Allergy and Immunology: Denies hives, insect bite sensitivity  Hematological and Lymphatic: Denies bleeding problems, swollen glands   Psychological: Denies depression, suicidal ideation, anxiety, panic, mood swings  Dermatological: Denies pruritus, rash, skin lesion changes      Vitals:  Vitals:    07/10/20 0801   BP: 104/78   Pulse: 70   Temp: 98 4 °F (36 9 °C)   SpO2: 98%       Body mass index is 31 74 kg/m²  Weight (last 2 days)     Date/Time   Weight    07/10/20 0801   76 2 (168)                Physical Examination:  General: Patient is not in acute distress  Awake, alert, responding to commands  No weight gain or loss  Head: Normocephalic  Atraumatic  Eyes: Conjunctiva and lids with no swelling, erythema or discharge  Both pupils normal sized, round and reactive to light  Sclera nonicteric  ENT: External examination of nose and ear normal  Otoscopic examination shows translucent tympanic membranes with patent canals without erythema  Oropharynx moist with no erythema, edema, exudate or lesions  Neck: Supple  JVP not raised  Trachea midline  No masses  No thyromegaly  Lungs: No signs of increased work of breathing or respiratory distress  Bilateral bronchovascular breath sounds with no crackles or rhonchi  Chest wall: No tenderness  Cardiovascular: Normal PMI  No thrills  Regular rate and rhythm  S1 and S2 normal  No murmur, rub or gallop  Gastrointestinal: Abdomen soft, nontender  No guarding or rigidity   Liver and spleen not palpable  Bowel sounds present  Neurologic: Cranial nerves II-XII intact  Cortical functions normal  Motor system - Reflexes 2+ and symmetrical  Sensations normal  Musculoskeletal: Gait normal  No joint tenderness  Integumentary: Skin normal with no rash or lesions  Lymphatic: No palpable lymph nodes in neck, axilla or groin  Extremities: No clubbing, cyanosis, edema or varicosities  Psychological: Judgement and insight normal  Mood and affect normal      Laboratory Results:  CBC with diff:   Lab Results   Component Value Date    WBC 4 24 (L) 07/09/2020    RBC 4 19 07/09/2020    HGB 13 2 07/09/2020    HCT 41 1 07/09/2020    MCV 98 07/09/2020    MCH 31 5 07/09/2020    RDW 12 5 07/09/2020     07/09/2020       CMP:  Lab Results   Component Value Date    CREATININE 0 72 07/09/2020    BUN 17 07/09/2020    K 4 6 07/09/2020     (H) 07/09/2020    CO2 29 07/09/2020    ALKPHOS 82 07/09/2020    ALT 23 07/09/2020    AST 16 07/09/2020       Lab Results   Component Value Date    HGBA1C 4 9 04/18/2019       No results found for: TROPONINI, CKMB, CKTOTAL    Lipid Profile:   No results found for: CHOL  Lab Results   Component Value Date    HDL 61 07/09/2020    HDL 55 01/03/2020     Lab Results   Component Value Date    LDLCALC 175 (H) 07/09/2020    LDLCALC 137 (H) 01/03/2020     Lab Results   Component Value Date    TRIG 97 07/09/2020    TRIG 84 01/03/2020       Imaging Results:  Mammo diagnostic left w 3d & cad, US breast left limited (diagnostic)  Narrative: DIAGNOSIS: Abnormal mammogram     TECHNIQUE:   Digital diagnostic mammography was performed  Computer Aided Detection   (CAD) analyzed all applicable images  Ultrasound of the left breast(s) was performed  COMPARISONS: Prior breast imaging dated: 02/18/2020, 07/10/2018,   02/16/2018, 06/21/2017, 11/11/2015, 04/12/2014, and 03/20/2013    RELEVANT HISTORY:   Family Breast Cancer History: No known family history of breast cancer    Family Medical History: No known relevant family medical history  Personal History: No known relevant hormone history  Surgical history   includes breast biopsy  No known relevant medical history  RISK ASSESSMENT:   5 Year Tyrer-Cuzick: 1 48 %  10 Year Tyrer-Cuzick: 3 1 %  Lifetime Tyrer-Cuzick: 13 82 %    TISSUE DENSITY:   The breasts are heterogeneously dense, which may obscure small masses  INDICATION: Doron Mckinley is a 52 y o  female presenting for abnormal   mammogram     FINDINGS:   LEFT  1) FOCAL ASYMMETRY: Previously described finding does not persist   No   ultrasound correlate is identified  There are no suspicious masses, grouped microcalcifications or areas of   architectural distortion  The skin and nipple areolar complex are   unremarkable  Impression: No evidence of malignancy  ASSESSMENT/BI-RADS CATEGORY:  Left: 2 - Benign  Overall: 2 - Benign    RECOMMENDATION:       - Routine screening mammogram in 1 year for both breasts      Workstation ID: [id number]        Health Maintenance:  Health Maintenance   Topic Date Due    CRC Screening: Colonoscopy  1970    HIV Screening  06/06/1985    Annual Physical  06/06/1988    Influenza Vaccine  07/01/2020    BMI: Followup Plan  01/06/2021    MAMMOGRAM  03/03/2021    BMI: Adult  07/10/2021    Cervical Cancer Screening  12/30/2022    DTaP,Tdap,and Td Vaccines (2 - Td) 01/06/2030    Pneumococcal Vaccine: 65+ Years (1 of 2 - PCV13) 06/06/2035    Pneumococcal Vaccine: Pediatrics (0 to 5 Years) and At-Risk Patients (6 to 59 Years)  Aged Out    HIB Vaccine  Aged Out    Hepatitis B Vaccine  Aged Out    IPV Vaccine  Aged Out    Hepatitis A Vaccine  Aged Out    Meningococcal ACWY Vaccine  Aged Out    HPV Vaccine  Aged [employer] History   Administered Date(s) Administered    INFLUENZA 10/10/2014, 10/09/2015, 08/29/2017, 08/28/2018    Influenza Quadrivalent Preservative Free 3 years and older IM 08/29/2017    Influenza, injectable, quadrivalent, preservative free 0 5 mL 08/28/2018, 08/27/2019    Tdap 01/06/2020          MD Homero  7/10/2020,8:19 AM

## 2020-07-14 ENCOUNTER — TELEPHONE (OUTPATIENT)
Dept: INTERNAL MEDICINE CLINIC | Facility: CLINIC | Age: 50
End: 2020-07-14

## 2020-07-14 NOTE — TELEPHONE ENCOUNTER
I thought we decided on not taking medication for 4 months and trying the diet and then decide in 4 months if she needs medication

## 2020-07-14 NOTE — TELEPHONE ENCOUNTER
Saw Dr Josie Quintanilla on 7/10 she was to call in something for her cholestrol  Nothing has been called in as of today  Did she change her mind? Please call patient and let her know

## 2020-11-18 ENCOUNTER — LAB (OUTPATIENT)
Dept: LAB | Facility: CLINIC | Age: 50
End: 2020-11-18
Payer: COMMERCIAL

## 2020-11-18 DIAGNOSIS — E78.2 MIXED HYPERLIPIDEMIA: ICD-10-CM

## 2020-11-18 LAB
ALBUMIN SERPL BCP-MCNC: 3.7 G/DL (ref 3.5–5)
ALP SERPL-CCNC: 85 U/L (ref 46–116)
ALT SERPL W P-5'-P-CCNC: 22 U/L (ref 12–78)
ANION GAP SERPL CALCULATED.3IONS-SCNC: 3 MMOL/L (ref 4–13)
AST SERPL W P-5'-P-CCNC: 15 U/L (ref 5–45)
BASOPHILS # BLD AUTO: 0.04 THOUSANDS/ΜL (ref 0–0.1)
BASOPHILS NFR BLD AUTO: 1 % (ref 0–1)
BILIRUB SERPL-MCNC: 0.59 MG/DL (ref 0.2–1)
BUN SERPL-MCNC: 19 MG/DL (ref 5–25)
CALCIUM SERPL-MCNC: 9 MG/DL (ref 8.3–10.1)
CHLORIDE SERPL-SCNC: 107 MMOL/L (ref 100–108)
CHOLEST SERPL-MCNC: 220 MG/DL (ref 50–200)
CO2 SERPL-SCNC: 29 MMOL/L (ref 21–32)
CREAT SERPL-MCNC: 0.91 MG/DL (ref 0.6–1.3)
EOSINOPHIL # BLD AUTO: 0 THOUSAND/ΜL (ref 0–0.61)
EOSINOPHIL NFR BLD AUTO: 0 % (ref 0–6)
ERYTHROCYTE [DISTWIDTH] IN BLOOD BY AUTOMATED COUNT: 12.6 % (ref 11.6–15.1)
GFR SERPL CREATININE-BSD FRML MDRD: 74 ML/MIN/1.73SQ M
GLUCOSE P FAST SERPL-MCNC: 102 MG/DL (ref 65–99)
HCT VFR BLD AUTO: 41.2 % (ref 34.8–46.1)
HDLC SERPL-MCNC: 60 MG/DL
HGB BLD-MCNC: 13.3 G/DL (ref 11.5–15.4)
IMM GRANULOCYTES # BLD AUTO: 0.02 THOUSAND/UL (ref 0–0.2)
IMM GRANULOCYTES NFR BLD AUTO: 1 % (ref 0–2)
LDLC SERPL CALC-MCNC: 145 MG/DL (ref 0–100)
LYMPHOCYTES # BLD AUTO: 1.64 THOUSANDS/ΜL (ref 0.6–4.47)
LYMPHOCYTES NFR BLD AUTO: 39 % (ref 14–44)
MCH RBC QN AUTO: 31.2 PG (ref 26.8–34.3)
MCHC RBC AUTO-ENTMCNC: 32.3 G/DL (ref 31.4–37.4)
MCV RBC AUTO: 97 FL (ref 82–98)
MONOCYTES # BLD AUTO: 0.39 THOUSAND/ΜL (ref 0.17–1.22)
MONOCYTES NFR BLD AUTO: 9 % (ref 4–12)
NEUTROPHILS # BLD AUTO: 2.12 THOUSANDS/ΜL (ref 1.85–7.62)
NEUTS SEG NFR BLD AUTO: 50 % (ref 43–75)
NONHDLC SERPL-MCNC: 160 MG/DL
NRBC BLD AUTO-RTO: 0 /100 WBCS
PLATELET # BLD AUTO: 170 THOUSANDS/UL (ref 149–390)
PMV BLD AUTO: 12.6 FL (ref 8.9–12.7)
POTASSIUM SERPL-SCNC: 4.8 MMOL/L (ref 3.5–5.3)
PROT SERPL-MCNC: 6.7 G/DL (ref 6.4–8.2)
RBC # BLD AUTO: 4.26 MILLION/UL (ref 3.81–5.12)
SODIUM SERPL-SCNC: 139 MMOL/L (ref 136–145)
TRIGL SERPL-MCNC: 74 MG/DL
TSH SERPL DL<=0.05 MIU/L-ACNC: 2.43 UIU/ML (ref 0.36–3.74)
WBC # BLD AUTO: 4.21 THOUSAND/UL (ref 4.31–10.16)

## 2020-11-18 PROCEDURE — 84443 ASSAY THYROID STIM HORMONE: CPT

## 2020-11-18 PROCEDURE — 80061 LIPID PANEL: CPT

## 2020-11-18 PROCEDURE — 36415 COLL VENOUS BLD VENIPUNCTURE: CPT

## 2020-11-18 PROCEDURE — 80053 COMPREHEN METABOLIC PANEL: CPT

## 2020-11-18 PROCEDURE — 85025 COMPLETE CBC W/AUTO DIFF WBC: CPT

## 2020-11-20 ENCOUNTER — TELEMEDICINE (OUTPATIENT)
Dept: INTERNAL MEDICINE CLINIC | Facility: CLINIC | Age: 50
End: 2020-11-20
Payer: COMMERCIAL

## 2020-11-20 VITALS — WEIGHT: 160 LBS | BODY MASS INDEX: 30.23 KG/M2

## 2020-11-20 DIAGNOSIS — E78.2 MIXED HYPERLIPIDEMIA: Primary | ICD-10-CM

## 2020-11-20 DIAGNOSIS — M32.9 SYSTEMIC LUPUS ERYTHEMATOSUS, UNSPECIFIED SLE TYPE, UNSPECIFIED ORGAN INVOLVEMENT STATUS (HCC): ICD-10-CM

## 2020-11-20 DIAGNOSIS — F33.41 RECURRENT MAJOR DEPRESSIVE DISORDER, IN PARTIAL REMISSION (HCC): ICD-10-CM

## 2020-11-20 DIAGNOSIS — E55.9 VITAMIN D DEFICIENCY: ICD-10-CM

## 2020-11-20 PROCEDURE — 1036F TOBACCO NON-USER: CPT | Performed by: INTERNAL MEDICINE

## 2020-11-20 PROCEDURE — 3725F SCREEN DEPRESSION PERFORMED: CPT | Performed by: INTERNAL MEDICINE

## 2020-11-20 PROCEDURE — 99214 OFFICE O/P EST MOD 30 MIN: CPT | Performed by: INTERNAL MEDICINE

## 2021-01-01 DIAGNOSIS — F33.41 RECURRENT MAJOR DEPRESSIVE DISORDER, IN PARTIAL REMISSION (HCC): ICD-10-CM

## 2021-01-05 ENCOUNTER — ANNUAL EXAM (OUTPATIENT)
Dept: OBGYN CLINIC | Facility: CLINIC | Age: 51
End: 2021-01-05
Payer: COMMERCIAL

## 2021-01-05 VITALS — BODY MASS INDEX: 30.42 KG/M2 | DIASTOLIC BLOOD PRESSURE: 70 MMHG | SYSTOLIC BLOOD PRESSURE: 122 MMHG | WEIGHT: 161 LBS

## 2021-01-05 DIAGNOSIS — Z01.419 ENCOUNTER FOR GYNECOLOGICAL EXAMINATION: Primary | ICD-10-CM

## 2021-01-05 DIAGNOSIS — Z01.419 ROUTINE GYNECOLOGICAL EXAMINATION: ICD-10-CM

## 2021-01-05 DIAGNOSIS — Z12.11 ENCOUNTER FOR SCREENING COLONOSCOPY: ICD-10-CM

## 2021-01-05 DIAGNOSIS — Z12.31 SCREENING MAMMOGRAM, ENCOUNTER FOR: ICD-10-CM

## 2021-01-05 PROCEDURE — S0612 ANNUAL GYNECOLOGICAL EXAMINA: HCPCS | Performed by: OBSTETRICS & GYNECOLOGY

## 2021-01-05 RX ORDER — BUPROPION HYDROCHLORIDE 300 MG/1
TABLET ORAL
Qty: 90 TABLET | Refills: 1 | Status: SHIPPED | OUTPATIENT
Start: 2021-01-05 | End: 2021-06-29

## 2021-01-05 NOTE — PATIENT INSTRUCTIONS
Menopause   WHAT YOU NEED TO KNOW:   What is menopause? Menopause is a normal stage in a woman's life when her monthly periods stop  A woman who has not had a period for a full year after the age of 36 is considered to be in menopause  Menopause usually occurs between ages 52 to 48  Perimenopause is a stage before menopause that may cause signs and symptoms similar to menopause  Perimenopause may start about 4 years before menopause  What causes menopause? Menopause starts when the ovaries stop making the female hormones estrogen and progesterone  After menopause, a woman is no longer able to become pregnant  Any of the following may trigger menopause or early menopause:  · Older age    · Surgery, including a hysterectomy or oophorectomy    · Family history of early menopause    · Smoking    · Chemotherapy or pelvic radiation    · Chromosome abnormalities, including Bo syndrome and Fragile X syndrome    What are the signs and symptoms of menopause? The signs and symptoms of menopause can be different from woman to woman:  · Irregular menstrual cycles with heavy vaginal bleeding followed by decreased bleeding until it stops    · Hot flashes (feeling warm, flushed, and sweaty)     · Vaginal changes such as increased dryness     · Mood changes such as anxiety, depression, or decreased desire to have sex    · Trouble sleeping, joint pain, headaches    · Brittle nails, hair on chin or chest where it is normally absent    · Decrease in breast size and change in skin texture    What do I need to know about menopause? · You can still get pregnant while you have periods  Continue to use birth control if you do not want to get pregnant  You may need to use birth control until it has been 1 year since your periods stopped  · Hormone replacement therapy (HRT) can be used to treat symptoms of menopause  HRT is medicine that replaces your low hormone levels  HRT contains estrogen and sometimes progestin   HRT has benefits and risks  HRT decreases your risk for bone fractures by helping to prevent osteoporosis  HRT also protects you from colon cancer  HRT may increase your risk for breast cancer, blood clots, heart disease, and stroke  Ask your healthcare provider if HRT is right for you  How can I care for myself? · Manage hot flashes  Hot flashes are brief periods of feeling very warm, flushed, and sweaty  Hot flashes can last from a few seconds to several minutes  They may happen many times during the day, and are common at night  Layer your clothing so that you can easily remove some clothing and cool yourself during a hot flash  Cold drinks may also be helpful  · Reduce vaginal dryness  by using over-the-counter vaginal creams  Vaginal dryness may cause you to have pain or discomfort during sex  Only use creams that are made for vaginal use  Do  not  use petroleum jelly  You may need an estrogen cream to put in and around your vagina  Estrogen cream may help decrease vaginal dryness and lower your risk of vaginal infections  · Continue to use birth control  during perimenopause if you do not want to get pregnant  You may need to use birth control until it has been 1 year since your periods stopped  Ask your healthcare provider when you can stop using birth control to prevent pregnancy  How can I live a healthy lifestyle during and after menopause? After menopause, your risk for heart disease and bone loss increases  Ask about these and other ways to stay healthy:  · Exercise regularly  Exercise helps you maintain a healthy weight  Exercise can also help to control your blood pressure and cholesterol levels  Include weight-bearing exercise for strong bones  Weight bearing exercise is recommended for at least 30 minutes, 3 times a week  Ask your healthcare provider about the best exercise plan for you  · Eat a variety of healthy foods    Include fruits, vegetables, whole grains (whole-wheat bread, pasta, and cereals), low-fat dairy, and lean protein foods (beans, poultry, and fish)  Limit foods high in sodium (salt)  Ask your healthcare provider for more information about a meal plan that is right for you  · Do not smoke  If you smoke, it is never too late to quit  You are more likely to have a heart attack, lung disease, blood clots, and cancer if you smoke  Ask your healthcare provider for information if you need help quitting  · Take supplements as directed  You may need extra calcium and vitamin D to help prevent osteoporosis  · Limit alcohol and caffeine  Alcohol and caffeine may worsen your symptoms  When should I contact my healthcare provider? · You have vaginal bleeding after menopause  · You have questions or concerns about your condition or care  CARE AGREEMENT:   You have the right to help plan your care  Learn about your health condition and how it may be treated  Discuss treatment options with your healthcare providers to decide what care you want to receive  You always have the right to refuse treatment  The above information is an  only  It is not intended as medical advice for individual conditions or treatments  Talk to your doctor, nurse or pharmacist before following any medical regimen to see if it is safe and effective for you  © Copyright [address] Information is for End User's use only and may not be sold, redistributed or otherwise used for commercial purposes  All illustrations and images included in CareNotes® are the copyrighted property of A D A M , Inc  or [facility] Visit for Adults   WHAT [place]:   What is a wellness visit? A wellness visit is when you see your healthcare provider to get screened for health problems  Your healthcare provider will also give you advice on how to stay healthy  Write down your questions so you remember to ask them   Ask your healthcare provider how often you should have a wellness visit  What happens at a wellness visit? Your healthcare provider will ask about your health, and your family history of health problems  This includes high blood pressure, heart disease, and cancer  He or she will ask if you have symptoms that concern you, if you smoke, and about your mood  You may also be asked about your intake of medicines, supplements, food, and alcohol  Any of the following may be done:  · Your weight  will be checked  Your height may also be checked so your body mass index (BMI) can be calculated  Your BMI shows if you are at a healthy weight  · Your blood pressure  and heart rate will be checked  Your temperature may also be checked  · Blood and urine tests  may be done  Blood tests may be done to check your cholesterol levels  Abnormal cholesterol levels increase your risk for heart disease and stroke  You may also need a blood or urine test to check for diabetes if you are at increased risk  Urine tests may be done to look for signs of an infection or kidney disease  · A physical exam  includes checking your heartbeat and lungs with a stethoscope  Your healthcare provider may also check your skin to look for sun damage  · Screening tests  may be recommended  A screening test is done to check for diseases that may not cause symptoms  The screening tests you may need depend on your age, gender, family history, and lifestyle habits  For example, colorectal screening may be recommended if you are 48years old or older  What screening tests do I need if I am a woman? · A Pap smear  is used to screen for cervical cancer  Pap smears are usually done every 3 to 5 years depending on your age  You may need them more often if you have had abnormal Pap smear test results in the past  Ask your healthcare provider how often you should have a Pap smear  · A mammogram  is an x-ray of your breasts to screen for breast cancer   Experts recommend mammograms every 2 years starting at age 48 years  You may need a mammogram at age 52 years or younger if you have an increased risk for breast cancer  Talk to your healthcare provider about when you should start having mammograms and how often you need them  What vaccines might I need? · Get an influenza vaccine  every year  The influenza vaccine protects you from the flu  Several types of viruses cause the flu  The viruses change over time, so new vaccines are made each year  · Get a tetanus-diphtheria (Td) booster vaccine  every 10 years  This vaccine protects you against tetanus and diphtheria  Tetanus is a severe infection that may cause painful muscle spasms and lockjaw  Diphtheria is a severe bacterial infection that causes a thick covering in the back of your mouth and throat  · Get a human papillomavirus (HPV) vaccine  if you are female and aged 23 to 32 or male 23 to 24 and never received it  This vaccine protects you from HPV infection  HPV is the most common infection spread by sexual contact  HPV may also cause vaginal, penile, and anal cancers  · Get a pneumococcal vaccine  if you are aged 72 years or older  The pneumococcal vaccine is an injection given to protect you from pneumococcal disease  Pneumococcal disease is an infection caused by pneumococcal bacteria  The infection may cause pneumonia, meningitis, or an ear infection  · Get a shingles vaccine  if you are 60 or older, even if you have had shingles before  The shingles vaccine is an injection to protect you from the varicella-zoster virus  This is the same virus that causes chickenpox  Shingles is a painful rash that develops in people who had chickenpox or have been exposed to the virus  How can I eat healthy? My Plate is a model for planning healthy meals  It shows the types and amounts of foods that should go on your plate  Fruits and vegetables make up about half of your plate, and grains and protein make up the other half   A serving of dairy is included on the side of your plate  The amount of calories and serving sizes you need depends on your age, gender, weight, and height  Examples of healthy foods are listed below:  · Eat a variety of vegetables  such as dark green, red, and orange vegetables  You can also include canned vegetables low in sodium (salt) and frozen vegetables without added butter or sauces  · Eat a variety of fresh fruits , canned fruit in 100% juice, frozen fruit, and dried fruit  · Include whole grains  At least half of the grains you eat should be whole grains  Examples include whole-wheat bread, wheat pasta, brown rice, and whole-grain cereals such as oatmeal     · Eat a variety of protein foods such as seafood (fish and shellfish), lean meat, and poultry without skin (turkey and chicken)  Examples of lean meats include pork leg, shoulder, or tenderloin, and beef round, sirloin, tenderloin, and extra lean ground beef  Other protein foods include eggs and egg substitutes, beans, peas, soy products, nuts, and seeds  · Choose low-fat dairy products such as skim or 1% milk or low-fat yogurt, cheese, and cottage cheese  · Limit unhealthy fats  such as butter, hard margarine, and shortening  How much exercise do I need? Exercise at least 30 minutes per day on most days of the week  Some examples of exercise include walking, biking, dancing, and swimming  You can also fit in more physical activity by taking the stairs instead of the elevator or parking farther away from stores  Include muscle strengthening activities 2 days each week  Regular exercise provides many health benefits  It helps you manage your weight, and decreases your risk for type 2 diabetes, heart disease, stroke, and high blood pressure  Exercise can also help improve your mood  Ask your healthcare provider about the best exercise plan for you  What are some general health and safety guidelines I should follow? · Do not smoke    Nicotine and other chemicals in cigarettes and cigars can cause lung damage  Ask your healthcare provider for information if you currently smoke and need help to quit  E-cigarettes or smokeless tobacco still contain nicotine  Talk to your healthcare provider before you use these products  · Limit alcohol  A drink of alcohol is 12 ounces of beer, 5 ounces of wine, or 1½ ounces of liquor  · Lose weight, if needed  Being overweight increases your risk of certain health conditions  These include heart disease, high blood pressure, type 2 diabetes, and certain types of cancer  · Protect your skin  Do not sunbathe or use tanning beds  Use sunscreen with a SPF 15 or higher  Apply sunscreen at least 15 minutes before you go outside  Reapply sunscreen every 2 hours  Wear protective clothing, hats, and sunglasses when you are outside  · Drive safely  Always wear your seatbelt  Make sure everyone in your car wears a seatbelt  A seatbelt can save your life if you are in an accident  Do not use your cell phone when you are driving  This could distract you and cause an accident  Pull over if you need to make a call or send a text message  · Practice safe sex  Use latex condoms if are sexually active and have more than one partner  Your healthcare provider may recommend screening tests for sexually transmitted infections (STIs)  · Wear helmets, lifejackets, and protective gear  Always wear a helmet when you ride a bike or motorcycle, go skiing, or play sports that could cause a head injury  Wear protective equipment when you play sports  Wear a lifejacket when you are on a boat or doing water sports  CARE AGREEMENT:   You have the right to help plan your care  Learn about your health condition and how it may be treated  Discuss treatment options with your healthcare providers to decide what care you want to receive  You always have the right to refuse treatment  The above information is an  only  It is not intended as medical advice for individual conditions or treatments  Talk to your doctor, nurse or pharmacist before following any medical regimen to see if it is safe and effective for you  © Copyright [address] Information is for End User's use only and may not be sold, redistributed or otherwise used for commercial purposes   All illustrations and images included in CareNotes® are the copyrighted property of A D A M , Inc  or [address] [place] [address]

## 2021-01-05 NOTE — PROGRESS NOTES
Assessment/Plan:    Routine gynecological examination  Pap/HPV current  Mammogram ordered    Encourage healthy diet, exercise, Calcium 1200mg per day and at least 800 iu Vitamin D daily  Diagnoses and all orders for this visit:    Encounter for gynecological examination    Screening mammogram, encounter for  -     Mammo screening bilateral w 3d & cad; Future    Encounter for screening colonoscopy  -     Ambulatory referral to Gastroenterology; Future    Routine gynecological examination    Other orders  -     Misc Natural Products (BLACK CHERRY CONCENTRATE PO); Take by mouth          Subjective:      Patient ID: Obdulio Hernandez is a 48 y o  female  Patient presents for a routine annual visit  Age of first period-Y/O  Last Pap Smear- 19 Neg-HPV  LMP-  Mammogram-3/3/20 WNL  Colonoscopy-    Non smoker  Social drinker  Not currently sexually active  No concerns/questions for today's visit    Doing very well  Exercising, has lost weight  Very positive outlook  Teaching - english and theater  Recently was involved in program to help create cirriculum in [facility] for mindfulness with highschool students  Menopause x 1 year  Night sweats annoying but managing  Gynecologic Exam  The patient's pertinent negatives include no genital itching, genital lesions, genital odor, genital rash, pelvic pain, vaginal bleeding or vaginal discharge  The patient is experiencing no pain  Pertinent negatives include no chills, constipation, diarrhea, fever, frequency, nausea, painful intercourse, sore throat, urgency or vomiting  She is postmenopausal        The following portions of the patient's history were reviewed and updated as appropriate:   She  has a past medical history of Anxiety, Chronic pain of right knee (2016), Depression, Lupus ([facility] [address] ), Migrainous headache without aura (2016), and Rheumatoid arthritis ([facility] [address] )    She   Patient Active Problem List    Diagnosis Date Noted    Mixed hyperlipidemia 2020    Routine gynecological examination 2019    Anxiety 2019    Breast lump 2017    Vitamin D deficiency 2017    Obesity, Class I, BMI 30-34 9 2016    Recurrent major depressive disorder, in partial remission ([facility] [address] ) 2014    Systemic lupus erythematosus ([facility] ) 2014     She  has a past surgical history that includes Appendectomy;  section; Knee arthroscopy (Right); Carpal tunnel release; Myomectomy; Tubal ligation (); Myomectomy; and Breast biopsy (Left, 2018)  Her family history includes Cancer in her paternal grandmother; Diabetes in her father; Hypertension in her father; Other in her mother and sister; Spina bifida in her mother  She  reports that she has never smoked  She has never used smokeless tobacco  She reports current alcohol use  She reports current drug use  Drug: Marijuana  Current Outpatient Medications   Medication Sig Dispense Refill    aspirin 81 mg chewable tablet Chew 81 mg daily      Black Cohosh 540 MG CAPS Take by mouth      buPROPion (WELLBUTRIN XL) 300 mg 24 hr tablet TAKE 1 TABLET BY MOUTH EVERY DAY 90 tablet 1    ergocalciferol (VITAMIN D2) 50,000 units Take 50,000 Units by mouth once a week      Misc Natural Products (BLACK CHERRY CONCENTRATE PO) Take by mouth      Turmeric Curcumin 500 MG CAPS Take by mouth      GLUCOSA-CHONDR-NA CHONDR-MSM PO Take by mouth       No current facility-administered medications for this visit        Current Outpatient Medications on File Prior to Visit   Medication Sig    aspirin 81 mg chewable tablet Chew 81 mg daily    Black Cohosh 540 MG CAPS Take by mouth    buPROPion (WELLBUTRIN XL) 300 mg 24 hr tablet TAKE 1 TABLET BY MOUTH EVERY DAY    ergocalciferol (VITAMIN D2) 50,000 units Take 50,000 Units by mouth once a week    Misc Natural Products (BLACK CHERRY CONCENTRATE PO) Take by mouth    Turmeric Curcumin 500 MG CAPS Take by mouth    GLUCOSA-CHONDR-NA CHONDR-MSM PO Take by mouth    [DISCONTINUED] buPROPion (WELLBUTRIN XL) 300 mg 24 hr tablet TAKE 1 TABLET BY MOUTH EVERY DAY     No current facility-administered medications on file prior to visit  She is allergic to acetaminophen-codeine; codeine sulfate; hydrocodone; iodinated diagnostic agents; iodine; morphine; and oxycodone       Review of Systems   Constitutional: Negative for activity change, appetite change, chills, fatigue and fever  HENT: Negative for rhinorrhea, sneezing and sore throat  Eyes: Negative for visual disturbance  Respiratory: Negative for cough, shortness of breath and wheezing  Cardiovascular: Negative for chest pain, palpitations and leg swelling  Gastrointestinal: Negative for abdominal distention, constipation, diarrhea, nausea and vomiting  Genitourinary: Negative for difficulty urinating, frequency, pelvic pain, urgency and vaginal discharge  Neurological: Negative for syncope and light-headedness  Objective:      /70   Wt 73 kg (161 lb)   LMP 05/01/2019 (Approximate)   BMI 30 42 kg/m²          Physical Exam  Constitutional:       General: She is not in acute distress  Appearance: She is well-developed  She is not diaphoretic  Chest:      Breasts: Breasts are symmetrical          Right: No inverted nipple, mass, nipple discharge, skin change or tenderness  Left: No inverted nipple, mass, nipple discharge, skin change or tenderness  Abdominal:      General: Bowel sounds are normal  There is no distension  Palpations: Abdomen is soft  There is no mass  Tenderness: There is no abdominal tenderness  There is no guarding or rebound  Genitourinary:     Labia:         Right: No rash, tenderness, lesion or injury  Left: No rash, tenderness, lesion or injury  Vagina: No vaginal discharge or bleeding  Cervix: No cervical motion tenderness, discharge or friability        Uterus: Not deviated, not enlarged, not fixed and not tender  Adnexa:         Right: No mass, tenderness or fullness  Left: No mass, tenderness or fullness  Comments: Urethral meatus- no lesions, non tender  Urethra non tender  Bladder non tender  Thin, atrophic vaginal mucosa  Skin:     General: Skin is warm and dry  Coloration: Skin is not pale  Findings: No erythema or rash

## 2021-01-20 ENCOUNTER — TELEPHONE (OUTPATIENT)
Dept: GASTROENTEROLOGY | Facility: CLINIC | Age: 51
End: 2021-01-20

## 2021-01-20 ENCOUNTER — TELEPHONE (OUTPATIENT)
Dept: OBGYN CLINIC | Age: 51
End: 2021-01-20

## 2021-01-20 DIAGNOSIS — N92.6 IRREGULAR UTERINE BLEEDING: ICD-10-CM

## 2021-01-20 DIAGNOSIS — N95.0 POST-MENOPAUSAL BLEEDING: Primary | ICD-10-CM

## 2021-01-20 NOTE — TELEPHONE ENCOUNTER
Pt returned my call, I advised as directed  pt agreed with plan of action  I provided pt with the number to central scheduling  I informed pt ms littlejohn will be giving her a call to get her scheduled in [place] with [facility] for an EMB as she will be able to get her in sooner than I can  Pt was grateful and agreed to plan and will await the call

## 2021-01-20 NOTE — TELEPHONE ENCOUNTER
Pt calling because she was told by Dr Darek Arcos to call if she started to bleed  Pt states today she started to bleed and its light, nothing concerning but because told to call she wants to let her know       Best call back ending in [phone]

## 2021-01-21 ENCOUNTER — HOSPITAL ENCOUNTER (OUTPATIENT)
Dept: ULTRASOUND IMAGING | Facility: HOSPITAL | Age: 51
Discharge: HOME | End: 2021-01-21
Attending: OBSTETRICS & GYNECOLOGY
Payer: COMMERCIAL

## 2021-01-21 DIAGNOSIS — N95.0 POST-MENOPAUSAL BLEEDING: ICD-10-CM

## 2021-01-21 PROCEDURE — 76856 US EXAM PELVIC COMPLETE: CPT

## 2021-01-21 PROCEDURE — 76830 TRANSVAGINAL US NON-OB: CPT

## 2021-02-04 ENCOUNTER — TELEPHONE (OUTPATIENT)
Dept: INTERNAL MEDICINE CLINIC | Facility: CLINIC | Age: 51
End: 2021-02-04

## 2021-02-04 ENCOUNTER — ANESTHESIA EVENT (OUTPATIENT)
Dept: GASTROENTEROLOGY | Facility: HOSPITAL | Age: 51
End: 2021-02-04

## 2021-02-04 NOTE — ANESTHESIA PREPROCEDURE EVALUATION
Procedure:  COLONOSCOPY    Relevant Problems   CARDIO   (+) Mixed hyperlipidemia      MUSCULOSKELETAL   (+) Systemic lupus erythematosus (HCC)      NEURO/PSYCH   (+) Anxiety   (+) Recurrent major depressive disorder, in partial remission (HCC)      Recurrent major depressive disorder, in partial remission (HCC)   Breast lump   Obesity, Class I, BMI 30-34 9   Systemic lupus erythematosus (HCC)   Vitamin D deficiency   Anxiety   Routine gynecological examination   Mixed hyperlipidemia         Physical Exam    Airway    Mallampati score: II  TM Distance: >3 FB  Neck ROM: full     Dental       Cardiovascular  Cardiovascular exam normal    Pulmonary  Pulmonary exam normal     Other Findings       Anxiety    Chronic pain of right knee Last assessed    Migrainous headache without aura Last assessed   Lupus (HCC)    Rheumatoid arthritis ([facility] [address] )    Depression          Anesthesia Plan  ASA Score- 2     Anesthesia Type- IV sedation with anesthesia with ASA Monitors  Additional Monitors:   Airway Plan:           Plan Factors-Exercise tolerance (METS): >4 METS  Chart reviewed  EKG reviewed  Imaging results reviewed  Existing labs reviewed  Patient summary reviewed  Induction- intravenous  Postoperative Plan-     Informed Consent- Anesthetic plan and risks discussed with patient  I personally reviewed this patient with the CRNA  Discussed and agreed on the Anesthesia Plan with the CRNA  Wong Lira

## 2021-02-04 NOTE — TELEPHONE ENCOUNTER
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

## 2021-02-05 ENCOUNTER — HOSPITAL ENCOUNTER (OUTPATIENT)
Dept: GASTROENTEROLOGY | Facility: HOSPITAL | Age: 51
Setting detail: OUTPATIENT SURGERY
Discharge: HOME | End: 2021-02-05
Attending: INTERNAL MEDICINE | Admitting: INTERNAL MEDICINE
Payer: COMMERCIAL

## 2021-02-05 ENCOUNTER — ANESTHESIA (OUTPATIENT)
Dept: GASTROENTEROLOGY | Facility: HOSPITAL | Age: 51
End: 2021-02-05

## 2021-02-05 VITALS
WEIGHT: 165.34 LBS | TEMPERATURE: 97.6 F | SYSTOLIC BLOOD PRESSURE: 100 MMHG | BODY MASS INDEX: 31.22 KG/M2 | DIASTOLIC BLOOD PRESSURE: 57 MMHG | RESPIRATION RATE: 16 BRPM | HEART RATE: 63 BPM | OXYGEN SATURATION: 96 % | HEIGHT: 61 IN

## 2021-02-05 VITALS — HEART RATE: 60 BPM

## 2021-02-05 DIAGNOSIS — Z12.11 SCREENING FOR COLON CANCER: ICD-10-CM

## 2021-02-05 PROCEDURE — G0121 COLON CA SCRN NOT HI RSK IND: HCPCS | Performed by: INTERNAL MEDICINE

## 2021-02-05 RX ORDER — SODIUM CHLORIDE, SODIUM LACTATE, POTASSIUM CHLORIDE, CALCIUM CHLORIDE 600; 310; 30; 20 MG/100ML; MG/100ML; MG/100ML; MG/100ML
125 INJECTION, SOLUTION INTRAVENOUS CONTINUOUS
Status: DISCONTINUED | OUTPATIENT
Start: 2021-02-05 | End: 2021-02-09 | Stop reason: HOSPADM

## 2021-02-05 RX ORDER — LIDOCAINE HYDROCHLORIDE 10 MG/ML
INJECTION, SOLUTION EPIDURAL; INFILTRATION; INTRACAUDAL; PERINEURAL AS NEEDED
Status: DISCONTINUED | OUTPATIENT
Start: 2021-02-05 | End: 2021-02-05

## 2021-02-05 RX ORDER — PROPOFOL 10 MG/ML
INJECTION, EMULSION INTRAVENOUS AS NEEDED
Status: DISCONTINUED | OUTPATIENT
Start: 2021-02-05 | End: 2021-02-05

## 2021-02-05 RX ADMIN — PROPOFOL 120 MG: 10 INJECTION, EMULSION INTRAVENOUS at 09:54

## 2021-02-05 RX ADMIN — LIDOCAINE HYDROCHLORIDE 20 MG: 10 INJECTION, SOLUTION EPIDURAL; INFILTRATION; INTRACAUDAL; PERINEURAL at 09:52

## 2021-02-05 RX ADMIN — SODIUM CHLORIDE, SODIUM LACTATE, POTASSIUM CHLORIDE, AND CALCIUM CHLORIDE: .6; .31; .03; .02 INJECTION, SOLUTION INTRAVENOUS at 09:49

## 2021-02-05 RX ADMIN — PROPOFOL 20 MG: 10 INJECTION, EMULSION INTRAVENOUS at 09:59

## 2021-02-05 RX ADMIN — PROPOFOL 30 MG: 10 INJECTION, EMULSION INTRAVENOUS at 09:56

## 2021-02-05 RX ADMIN — PROPOFOL 30 MG: 10 INJECTION, EMULSION INTRAVENOUS at 10:02

## 2021-02-05 NOTE — H&P
History and Physical -  [facility]  nAdrew Tapia 48 y o  female MRN: [id number]                  HPI: Andrew Tapia is a 48y o  year old female who presents for colonoscopy for colon cancer screening      REVIEW OF SYSTEMS: Per the HPI, and otherwise unremarkable      Historical Information   Past Medical History:   Diagnosis Date    Anxiety     Chronic pain of right knee 2016    Last assessed     Depression     Lupus ([facility] )     Migrainous headache without aura 2016    Last assessed    Raynaud disease     [facility] ([facility] )     Sjogren's disease ([facility] )      Past Surgical History:   Procedure Laterality Date    APPENDECTOMY      BREAST BIOPSY Left 2018    benign    CARPAL TUNNEL RELEASE       SECTION      x2    KNEE ARTHROSCOPY Right     MYOMECTOMY      MYOMECTOMY      TUBAL LIGATION  2009     Social History   Social History     Substance and Sexual Activity   Alcohol Use Yes    Frequency: 2-4 times a month    Comment: Minimal      Social History     Substance and Sexual Activity   Drug Use Yes    Frequency: 4 0 times per week    Types: Marijuana    Comment: every other day     Social History     Tobacco Use   Smoking Status Never Smoker   Smokeless Tobacco Never Used     Family History   Problem Relation Age of Onset    Other Mother         Back disorder    Poweshiek Evaristo Spina bifida Mother    Poweshiek Evaristo Diabetes Father     Hypertension Father     Other Sister         Back disorder     Cancer Paternal Grandmother         lymphoma     Heart disease Neg Hx     Stroke Neg Hx     Thyroid disease Neg Hx        Meds/Allergies     (Not in a hospital admission)      Allergies   Allergen Reactions    Acetaminophen-Codeine Hives    Codeine Sulfate Hives    Hydrocodone Hives    Iodinated Diagnostic Agents Rash, Vomiting and Hives    Iodine Rash and Vomiting    Morphine Rash    Oxycodone Rash       Objective     Blood pressure 123/57, pulse 58, temperature 97 5 °F (36 4 °C), temperature source Temporal, resp  rate 18, height 5' 1" (1 549 m), weight 75 kg (165 lb 5 5 oz), last menstrual period 05/01/2019, SpO2 98 %  PHYSICAL EXAM    /57   Pulse 58   Temp 97 5 °F (36 4 °C) (Temporal)   Resp 18   Ht 5' 1" (1 549 m)   Wt 75 kg (165 lb 5 5 oz)   LMP 05/01/2019 (Approximate)   SpO2 98%   BMI 31 24 kg/m²       Gen: NAD  CV: RRR  CHEST: Clear  ABD: soft, NT/ND  EXT: no edema      ASSESSMENT/PLAN:  This is a 48y o  year old female here for colonoscopy, and she is stable and optimized for her procedure

## 2021-02-05 NOTE — DISCHARGE INSTRUCTIONS
Colonoscopy   WHAT YOU NEED TO KNOW:   A colonoscopy is a procedure to examine the inside of your colon (intestine) with a scope  Polyps or tissue growths may have been removed during your colonoscopy  It is normal to feel bloated and to have some abdominal discomfort  You should be passing gas  If you have hemorrhoids or you had polyps removed, you may have a small amount of bleeding  DISCHARGE INSTRUCTIONS:   Seek care immediately if:   · You have a large amount of bright red blood in your bowel movements  · Your abdomen is hard and firm and you have severe pain  · You have sudden trouble breathing  Contact your healthcare provider if:   · You develop a rash or hives  · You have a fever within 24 hours of your procedure       · You have not had a bowel movement for 3 days after your procedure  · You have questions or concerns about your condition or care  Activity:   · Do not lift, strain, or run  for 3 days after your procedure  · Rest after your procedure  You have been given medicine to relax you  Do not  drive or make important decisions until the day after your procedure  Return to your normal activity as directed  · Relieve gas and discomfort from bloating  by lying on your right side with a heating pad on your abdomen  You may need to take short walks to help the gas move out  Eat small meals until bloating is relieved  If you had polyps removed: For 7 days after your procedure:  · Do not  take aspirin  · Do not  go on long car rides  Follow up with your healthcare provider as directed:  Write down your questions so you remember to ask them during your visits  © 2017 [address] is for End User's use only and may not be sold, redistributed or otherwise used for commercial purposes  All illustrations and images included in CareNotes® are the copyrighted property of [employer]  or [employer]    The above information is an  only  It is not intended as medical advice for individual conditions or treatments  Talk to your doctor, nurse or pharmacist before following any medical regimen to see if it is safe and effective for you

## 2021-02-05 NOTE — ANESTHESIA POSTPROCEDURE EVALUATION
Post-Op Assessment Note    CV Status:  Stable  Pain Score: 0    Pain management: adequate     Mental Status:  Sleepy   Hydration Status:  Stable   PONV Controlled:  Controlled   Airway Patency:  Patent      Post Op Vitals Reviewed: Yes      Staff: CRNA         No complications documented      BP   104/64   Temp      Pulse 67   Resp 16   SpO2 98%

## 2021-02-08 ENCOUNTER — PROCEDURE VISIT (OUTPATIENT)
Dept: OBGYN CLINIC | Facility: CLINIC | Age: 51
End: 2021-02-08
Payer: COMMERCIAL

## 2021-02-08 VITALS
HEIGHT: 61 IN | SYSTOLIC BLOOD PRESSURE: 110 MMHG | BODY MASS INDEX: 31.53 KG/M2 | WEIGHT: 167 LBS | DIASTOLIC BLOOD PRESSURE: 64 MMHG

## 2021-02-08 DIAGNOSIS — N95.0 POST-MENOPAUSAL BLEEDING: Primary | ICD-10-CM

## 2021-02-08 PROCEDURE — 88305 TISSUE EXAM BY PATHOLOGIST: CPT | Performed by: PATHOLOGY

## 2021-02-08 PROCEDURE — 3008F BODY MASS INDEX DOCD: CPT | Performed by: OBSTETRICS & GYNECOLOGY

## 2021-02-08 PROCEDURE — 58100 BIOPSY OF UTERUS LINING: CPT | Performed by: OBSTETRICS & GYNECOLOGY

## 2021-02-08 RX ORDER — LIDOCAINE 50 MG/G
PATCH TOPICAL
COMMUNITY
End: 2022-04-21

## 2021-02-08 NOTE — PATIENT INSTRUCTIONS
Postmenopausal Bleeding   WHAT YOU NEED TO KNOW:   What do I need to know about postmenopausal bleeding? Postmenopausal bleeding is bleeding that occurs after menopause  A woman who has not had a period for a full year after the age of 36 is considered to be in menopause  Postmenopausal bleeding may range from spotting to very heavy bleeding  What causes postmenopausal bleeding? · Thinning of the endometrium (lining of the uterus) called endometrial atrophy    · Polyps (noncancerous growths) that develop on the inner wall of your uterus or cervix    · Hormone replacement therapy    · Abnormal thickening of the endometrium called endometrial hyperplasia    · Tamoxifen (medicine used to treat breast cancer)    · Cervical, endometrial, or uterine cancer    How is postmenopausal bleeding diagnosed? Your healthcare provider will ask about medical conditions that you have, and that run in your family  He will also do a pelvic exam to check for problems with your cervix, uterus, and ovaries  You may also need any of the following:  · An ultrasound  uses sound waves to show pictures of your uterus on a monitor  Your healthcare provider may place saline fluid into your uterus with a catheter  The fluid helps show more detail in the ultrasound pictures of your uterus  · Endometrial biopsy  is used to collect a sample of cells from the inside of your uterus to be tested for cancer  · Hysteroscopy  is a procedure that is done to look inside your uterus  A small scope with a light and camera is placed into your vagina and cervix  Liquid or gas may be put through the scope to help healthcare providers see better  · Dilation and curettage (D&C)  is a procedure to remove tissue from the lining of your uterus  The tissue is sent to a lab for tests  How is postmenopausal bleeding treated? Treatment depends on the cause of your postmenopausal bleeding  If you have polyps, you may need surgery to remove them  Endometrial atrophy can be treated with medicines  Endometrial hyperplasia may be treated with progestin hormone therapy  Surgery to remove your uterus will be needed if you have endometrial or uterine cancer  Your fallopian tubes, ovaries, and nearby lymph nodes may also be removed  When should I contact my healthcare provider? · You continue to have vaginal bleeding, even with treatment  · You have pain in your abdomen or pelvis  · You have questions or concerns about your condition or care  CARE AGREEMENT:   You have the right to help plan your care  Learn about your health condition and how it may be treated  Discuss treatment options with your healthcare providers to decide what care you want to receive  You always have the right to refuse treatment  The above information is an  only  It is not intended as medical advice for individual conditions or treatments  Talk to your doctor, nurse or pharmacist before following any medical regimen to see if it is safe and effective for you  © Copyright [address] Information is for End User's use only and may not be sold, redistributed or otherwise used for commercial purposes   All illustrations and images included in CareNotes® are the copyrighted property of [employer]  or [address] [place] [address]

## 2021-02-08 NOTE — PROGRESS NOTES
Assessment/Plan:         Diagnoses and all orders for this visit:    Post-menopausal bleeding  Comments:  Episode of bleeding x 1 week  Ultrasound indicates possible fibroid/polyp  Here for EB  Orders:  -     Tissue Exam    Other orders  -     lidocaine (Lidoderm) 5 %; Lidoderm 5 % topical patch   APPLY 1 PATCH BY TOPICAL ROUTE ONCE DAILY (MAY WEAR UP TO 12HOURS )          Subjective:      Patient ID: Janine Plasencia is a 48 y o  female  Pt here due to  bleeding episode, approximately on 1/6/2021  Here for EB    H/o myomectomy in the past      Endometrial biopsy    Date/Time: 2/8/2021 10:19 AM  Performed by: Edwar Mas MD  Authorized by: Edwar Mas MD   Universal Protocol:  Consent: Verbal consent obtained  Risks and benefits: risks, benefits and alternatives were discussed  Consent given by: patient  Patient understanding: patient states understanding of the procedure being performed      Indication:     Indications: Post-menopausal bleeding      Chronicity of post-menopausal bleeding:  New    Progression of post-menopausal bleeding:  Resolved  Procedure:     Procedure: endometrial biopsy with Pipelle      A bivalve speculum was placed in the vagina: yes      The cervix was dilated: yes      Uterus sounded: yes      Uterus sound depth (cm):  9    Specimen collected: specimen collected and sent to pathology    Findings:     Uterus size:  9-10 weeks        The following portions of the patient's history were reviewed and updated as appropriate:   She  has a past medical history of Anxiety, Chronic pain of right knee (12/07/2016), Depression, Lupus ([facility] ), Migrainous headache without aura (12/07/2016), Raynaud disease, Rheumatoid arthritis ([facility] ), and Sjogren's disease ([facility] )    She   Patient Active Problem List    Diagnosis Date Noted    Mixed hyperlipidemia 01/06/2020    Routine gynecological examination 12/30/2019    Anxiety 02/06/2019    Breast lump 06/26/2017    Vitamin D deficiency 2017    Obesity, Class I, BMI 30-34 9 2016    Recurrent major depressive disorder, in partial remission ([facility] ) 2014    Systemic lupus erythematosus ([facility] ) 2014     She  has a past surgical history that includes Appendectomy;  section; Knee arthroscopy (Right); Carpal tunnel release; Myomectomy; Tubal ligation (); Myomectomy; and Breast biopsy (Left, 2018)  Her family history includes Cancer in her paternal grandmother; Diabetes in her father; Hypertension in her father; Other in her mother and sister; Spina bifida in her mother  She  reports that she has never smoked  She has never used smokeless tobacco  She reports current alcohol use  She reports current drug use  Frequency: 4 00 times per week  Drug: Marijuana  Current Outpatient Medications   Medication Sig Dispense Refill    Black Cohosh 540 MG CAPS Take by mouth      buPROPion (WELLBUTRIN XL) 300 mg 24 hr tablet TAKE 1 TABLET BY MOUTH EVERY DAY 90 tablet 1    ergocalciferol (VITAMIN D2) 50,000 units Take 50,000 Units by mouth once a week      Misc Natural Products (BLACK CHERRY CONCENTRATE PO) Take by mouth      Turmeric Curcumin 500 MG CAPS Take by mouth      aspirin 81 mg chewable tablet Chew 81 mg daily      GLUCOSA-CHONDR-NA CHONDR-MSM PO Take by mouth      lidocaine (Lidoderm) 5 % Lidoderm 5 % topical patch   APPLY 1 PATCH BY TOPICAL ROUTE ONCE DAILY (MAY WEAR UP TO 12HOURS )       No current facility-administered medications for this visit        Facility-Administered Medications Ordered in Other Visits   Medication Dose Route Frequency Provider Last Rate Last Admin    lactated ringers infusion  125 mL/hr Intravenous Continuous Skip Leung MD   Stopped at 21 1033     Current Outpatient Medications on File Prior to Visit   Medication Sig    Black Cohosh 540 MG CAPS Take by mouth    buPROPion (WELLBUTRIN XL) 300 mg 24 hr tablet TAKE 1 TABLET BY MOUTH EVERY DAY    ergocalciferol (VITAMIN D2) 50,000 units Take 50,000 Units by mouth once a week    Misc Natural Products (BLACK CHERRY CONCENTRATE PO) Take by mouth    Turmeric Curcumin 500 MG CAPS Take by mouth    aspirin 81 mg chewable tablet Chew 81 mg daily    GLUCOSA-CHONDR-NA CHONDR-MSM PO Take by mouth    lidocaine (Lidoderm) 5 % Lidoderm 5 % topical patch   APPLY 1 PATCH BY TOPICAL ROUTE ONCE DAILY (MAY WEAR UP TO 12HOURS )     Current Facility-Administered Medications on File Prior to Visit   Medication    lactated ringers infusion     She is allergic to acetaminophen-codeine; codeine sulfate; hydrocodone; iodinated diagnostic agents; iodine; morphine; and oxycodone       Review of Systems      Objective:      /64   Ht 5' 1" (1 549 m)   Wt 75 8 kg (167 lb)   LMP 01/18/2021 Comment:  Bleeding   Breastfeeding No   BMI 31 55 kg/m²          Physical Exam

## 2021-02-11 ENCOUNTER — TELEPHONE (OUTPATIENT)
Dept: OBGYN CLINIC | Facility: CLINIC | Age: 51
End: 2021-02-11

## 2021-02-11 NOTE — TELEPHONE ENCOUNTER
Patient just missed call regarding her results  She did get VM with results   She wanted us to be aware that she has been bleeding and cramping since she had the EB done on 2/8

## 2021-02-11 NOTE — TELEPHONE ENCOUNTER
Pt has been bleeding lightly every day since EB  I will get her an appt - via [facility] for pre op  If bleeding would totally stop - pt will cancel , possibly? Per KTM, pt should be scheduled for pre op d&c

## 2021-02-15 ENCOUNTER — CONSULT (OUTPATIENT)
Dept: OBGYN CLINIC | Facility: CLINIC | Age: 51
End: 2021-02-15
Payer: COMMERCIAL

## 2021-02-15 VITALS — DIASTOLIC BLOOD PRESSURE: 80 MMHG | SYSTOLIC BLOOD PRESSURE: 148 MMHG | WEIGHT: 170 LBS | BODY MASS INDEX: 32.12 KG/M2

## 2021-02-15 DIAGNOSIS — D25.0 FIBROIDS, SUBMUCOSAL: ICD-10-CM

## 2021-02-15 DIAGNOSIS — N95.0 POSTMENOPAUSAL BLEEDING: Primary | ICD-10-CM

## 2021-02-15 PROBLEM — N63.0 BREAST LUMP: Status: RESOLVED | Noted: 2017-06-26 | Resolved: 2021-02-15

## 2021-02-15 PROCEDURE — 99214 OFFICE O/P EST MOD 30 MIN: CPT | Performed by: OBSTETRICS & GYNECOLOGY

## 2021-02-15 PROCEDURE — 1036F TOBACCO NON-USER: CPT | Performed by: OBSTETRICS & GYNECOLOGY

## 2021-02-15 NOTE — ASSESSMENT & PLAN NOTE
Suspected on ultrasound  Bleeding may be due to presence of this fibroid, could be degenerating  At this time unclear

## 2021-02-15 NOTE — PATIENT INSTRUCTIONS
Hysteroscopy   AMBULATORY CARE:   What you need to know about a hysteroscopy:  A hysteroscopy is a procedure to find and treat problems in your uterus  A hysteroscopy may be done to find, and possibly treat, the cause of abnormal vaginal bleeding, problems getting pregnant, or miscarriage  It may also be done to insert or remove a device that prevents pregnancy  How to prepare for a hysteroscopy:   · Your procedure may be done at your healthcare provider's office, an outpatient facility, or a hospital  Arrange for someone to drive you home after your procedure  Your healthcare provider will talk to you about how to prepare  You may be told not to eat or drink anything after midnight on the day of your procedure  · You may need to stop taking blood thinners or aspirin several days before your procedure  This will help decrease your risk for bleeding  Your provider will tell you what medicines to take or not take on the day of your procedure  You may be told to take ibuprofen on the day of your procedure to control pain  You may be given an antibiotic to prevent infection  Tell healthcare providers if you have ever had an allergic reaction to an antibiotic  · Your healthcare provider may put medicine on your cervix before your procedure  This will help open your cervix so the scope can be inserted into your uterus more easily  A scope is a small tube with a light and a camera on the end  What will happen during a hysteroscopy:   · You may be given general anesthesia to keep you asleep and free from pain  You may instead be given medicine to help you relax, and local or spinal anesthesia  With local or spinal anesthesia, you may still feel pressure or pushing, but you should not feel any pain  Your healthcare provider will gently insert a device into your vagina  The device opens the walls of your vagina so your healthcare provider can see your cervix  Tools or medicine will be used to open your cervix  · Your provider will insert the scope through your cervix and into your uterus  Your provider may inject air or fluid to help see inside your uterus more clearly  Tools inserted through the scope may be used to remove scar tissue, growths such as polyps or fibroids, or a sample of tissue  Tools may also be used to control bleeding  · A vaginal pack or sanitary pad may be used to absorb the bleeding  A vaginal pack is a special gauze that is inserted into the vagina  It will be removed before you go home  What will happen after a hysteroscopy:  Healthcare providers will monitor you until you are awake  You may be able to go home, or you may need to spend a night in the hospital  It is normal to have vaginal bleeding and cramps for 2 to 3 days after your procedure  Your bleeding may range from barely staining a pad to soaking a pad every 2 to 3 hours  You may see blood clots on the pad  Call your healthcare provider if you see blood clots that are larger than the size of a quarter  Risks of a hysteroscopy: You may get an infection or bleed more than expected  Scar tissue may form in your uterus  This may make it difficult to get pregnant  You may have an allergic reaction to the fluid injected into your uterus  The fluid may build up and cause problems in your lungs, heart, or brain  This may become life-threatening  The scope or tools may make a hole in your cervix, uterus, bowels, or bladder  This may require more surgery to fix and may become life-threatening  Call 911 if:   · You have trouble breathing  Seek care immediately if:   · You have heavy vaginal bleeding that fills 1 or more sanitary pads in 1 hour  · You have severe pain or bloating in your abdomen  · You feel lightheaded, weak, and confused  · You see blood in your urine  · You stop urinating or urinate less than usual     Contact your healthcare provider if:   · You have a fever or chills      · You have pus or a foul-smelling odor coming from your vagina  · You see blood clots on your sanitary pad that are larger than the size of a quarter  · You have nausea or are vomiting  · Your skin is itchy, swollen, or you have a rash  · You have questions or concerns about your condition or care  Medicines: You may need any of the following:  · Estrogen  helps heal the lining of your uterus  · Antibiotics  help prevent a bacterial infection  · Prescription pain medicine  may be given  Ask your healthcare provider how to take this medicine safely  Some prescription pain medicines contain acetaminophen  Do not take other medicines that contain acetaminophen without talking to your healthcare provider  Too much acetaminophen may cause liver damage  Prescription pain medicine may cause constipation  Ask your healthcare provider how to prevent or treat constipation  · NSAIDs , such as ibuprofen, help decrease swelling, pain, and fever  NSAIDs can cause stomach bleeding or kidney problems in certain people  If you take blood thinner medicine, always ask your healthcare provider if NSAIDs are safe for you  Always read the medicine label and follow directions  · Take your medicine as directed  Contact your healthcare provider if you think your medicine is not helping or if you have side effects  Tell him or her if you are allergic to any medicine  Keep a list of the medicines, vitamins, and herbs you take  Include the amounts, and when and why you take them  Bring the list or the pill bottles to follow-up visits  Carry your medicine list with you in case of an emergency  Activity:  Do not have sex, use tampons, or douche for up to 6 weeks  These actions may cause an infection  Ask your healthcare provider if it is okay to take a tub bath or swim  Rest as needed  Do not drive, return to work, or exercise for 24 hours or as directed  You can return to most activities in 1 to 2 days     Follow up with your healthcare provider as directed:  Write down your questions so you remember to ask them during your visits  © Copyright [address] Information is for End User's use only and may not be sold, redistributed or otherwise used for commercial purposes  All illustrations and images included in CareNotes® are the copyrighted property of A [employer] M [employer] Inc  or Ricky Aponte  The above information is an  only  It is not intended as medical advice for individual conditions or treatments  Talk to your doctor, nurse or pharmacist before following any medical regimen to see if it is safe and effective for you

## 2021-02-15 NOTE — PROGRESS NOTES
Assessment/Plan:    Postmenopausal bleeding  EB collected benign    Due to presence of fibroid and persistent bleeding plan to proceed to [facility]  Hysteroscopy with myomectomy (myosure)    Fibroids, submucosal  Suspected on ultrasound  Bleeding may be due to presence of this fibroid, could be degenerating  At this time unclear  Diagnoses and all orders for this visit:    Postmenopausal bleeding    Fibroids, submucosal          Subjective:      Patient ID: Emry Kanner is a 48 y o  female  Patient presents for a D&C constult  48year old  with postmenopausal bleeding  EB collected benign  Ultrasound reveals a submucosal fibroid vs polyp  Patient has a history of fibroid removal in the past   Since EB has been having some bleeding and cramping  Would like to discuss next step in plan of care  Plan for [facility]  Hysteroscoy with myomectomy [facility])  Gynecologic Exam  The patient's primary symptoms include vaginal bleeding  This is a new problem  The current episode started 1 to 4 weeks ago  The problem occurs intermittently  The problem has been waxing and waning  The pain is mild  Pertinent negatives include no chills, constipation, diarrhea, fever, frequency, nausea, sore throat, urgency or vomiting  The vaginal bleeding is lighter than menses  She has been passing clots  She has not been passing tissue  The symptoms are aggravated by activity and urinating  She has tried NSAIDs for the symptoms  The treatment provided mild relief  She is sexually active  She is postmenopausal        The following portions of the patient's history were reviewed and updated as appropriate:   She  has a past medical history of Anxiety, Chronic pain of right knee (2016), Depression, Lupus ([facility] ), Migrainous headache without aura (2016), Raynaud disease, Rheumatoid arthritis ([facility] ), and Sjogren's disease ([facility] )    She   Patient Active Problem List    Diagnosis Date Noted    Postmenopausal bleeding 02/15/2021    Fibroids, submucosal 02/15/2021    Mixed hyperlipidemia 2020    Routine gynecological examination 2019    Anxiety 2019    Vitamin D deficiency 2017    Obesity, Class I, BMI 30-34 9 2016    [facility] 2014    Systemic lupus erythematosus ([facility] ) 2014     She  has a past surgical history that includes Appendectomy;  section; Knee arthroscopy (Right); Carpal tunnel release; Myomectomy; Tubal ligation (); Myomectomy; and Breast biopsy (Left, 2018)  Her family history includes Cancer in her paternal grandmother; Diabetes in her father; Hypertension in her father; Other in her mother and sister; Spina bifida in her mother  She  reports that she has never smoked  She has never used smokeless tobacco  She reports current alcohol use  She reports current drug use  Frequency: 4 00 times per week  Drug: Marijuana  Current Outpatient Medications   Medication Sig Dispense Refill    Black Cohosh 540 MG CAPS Take by mouth      buPROPion (WELLBUTRIN XL) 300 mg 24 hr tablet TAKE 1 TABLET BY MOUTH EVERY DAY 90 tablet 1    ergocalciferol (VITAMIN D2) 50,000 units Take 50,000 Units by mouth once a week      lidocaine (Lidoderm) 5 % Lidoderm 5 % topical patch   APPLY 1 PATCH BY TOPICAL ROUTE ONCE DAILY (MAY WEAR UP TO 12HOURS )      Misc Natural Products (BLACK CHERRY CONCENTRATE PO) Take by mouth      Turmeric Curcumin 500 MG CAPS Take by mouth      aspirin 81 mg chewable tablet Chew 81 mg daily      GLUCOSA-CHONDR-NA CHONDR-MSM PO Take by mouth       No current facility-administered medications for this visit        Current Outpatient Medications on File Prior to Visit   Medication Sig    Black Cohosh 540 MG CAPS Take by mouth    buPROPion (WELLBUTRIN XL) 300 mg 24 hr tablet TAKE 1 TABLET BY MOUTH EVERY DAY    ergocalciferol (VITAMIN D2) 50,000 units Take 50,000 Units by mouth once a week  lidocaine (Lidoderm) 5 % Lidoderm 5 % topical patch   APPLY 1 PATCH BY TOPICAL ROUTE ONCE DAILY (MAY WEAR UP TO 12HOURS )    Misc Natural Products (BLACK CHERRY CONCENTRATE PO) Take by mouth    Turmeric Curcumin 500 MG CAPS Take by mouth    aspirin 81 mg chewable tablet Chew 81 mg daily    GLUCOSA-CHONDR-NA CHONDR-MSM PO Take by mouth     No current facility-administered medications on file prior to visit  She is allergic to acetaminophen-codeine; codeine sulfate; hydrocodone; iodinated diagnostic agents; iodine; morphine; and oxycodone       Review of Systems   Constitutional: Negative for activity change, appetite change, chills, fatigue and fever  HENT: Negative for rhinorrhea, sneezing and sore throat  Eyes: Negative for visual disturbance  Respiratory: Negative for cough, shortness of breath and wheezing  Cardiovascular: Negative for chest pain, palpitations and leg swelling  Gastrointestinal: Negative for abdominal distention, constipation, diarrhea, nausea and vomiting  Genitourinary: Negative for difficulty urinating, frequency and urgency  Neurological: Negative for syncope and light-headedness  Objective:      /80   Wt 77 1 kg (170 lb)   LMP 01/18/2021 Comment:  Bleeding   BMI 32 12 kg/m²          Physical Exam  Constitutional:       General: She is not in acute distress  Appearance: She is well-developed  She is not diaphoretic  Cardiovascular:      Rate and Rhythm: Normal rate and regular rhythm  Heart sounds: Normal heart sounds  No murmur  No friction rub  No gallop  Pulmonary:      Effort: Pulmonary effort is normal  No respiratory distress  Breath sounds: Normal breath sounds  Abdominal:      General: Bowel sounds are normal  There is no distension  Palpations: Abdomen is soft  Tenderness: There is no abdominal tenderness  There is no guarding or rebound     Neurological:      Mental Status: She is alert and oriented to person, place, and time  Deep Tendon Reflexes: Reflexes are normal and symmetric

## 2021-02-15 NOTE — ASSESSMENT & PLAN NOTE
EB collected benign    Due to presence of fibroid and persistent bleeding plan to proceed to [facility]  Hysteroscopy with myomectomy (myosure)

## 2021-02-15 NOTE — H&P (VIEW-ONLY)
Assessment/Plan:    Postmenopausal bleeding  EB collected benign    Due to presence of fibroid and persistent bleeding plan to proceed to [facility]  Hysteroscopy with myomectomy (myosure)    Fibroids, submucosal  Suspected on ultrasound  Bleeding may be due to presence of this fibroid, could be degenerating  At this time unclear  Diagnoses and all orders for this visit:    Postmenopausal bleeding    Fibroids, submucosal          Subjective:      Patient ID: Andrew Tapia is a 48 y o  female  Patient presents for a D&C constult  48year old  with postmenopausal bleeding  EB collected benign  Ultrasound reveals a submucosal fibroid vs polyp  Patient has a history of fibroid removal in the past   Since EB has been having some bleeding and cramping  Would like to discuss next step in plan of care  Plan for [facility]  Hysteroscoy with myomectomy [facility])  Gynecologic Exam  The patient's primary symptoms include vaginal bleeding  This is a new problem  The current episode started 1 to 4 weeks ago  The problem occurs intermittently  The problem has been waxing and waning  The pain is mild  Pertinent negatives include no chills, constipation, diarrhea, fever, frequency, nausea, sore throat, urgency or vomiting  The vaginal bleeding is lighter than menses  She has been passing clots  She has not been passing tissue  The symptoms are aggravated by activity and urinating  She has tried NSAIDs for the symptoms  The treatment provided mild relief  She is sexually active  She is postmenopausal        The following portions of the patient's history were reviewed and updated as appropriate:   She  has a past medical history of Anxiety, Chronic pain of right knee (2016), Depression, Lupus ([facility] ), Migrainous headache without aura (2016), Raynaud disease, Rheumatoid arthritis ([facility] ), and Sjogren's disease ([facility] )    She   Patient Active Problem List    Diagnosis Date Noted    Postmenopausal bleeding 02/15/2021    Fibroids, submucosal 02/15/2021    Mixed hyperlipidemia 2020    Routine gynecological examination 2019    Anxiety 2019    Vitamin D deficiency 2017    Obesity, Class I, BMI 30-34 9 2016    [facility] 2014    Systemic lupus erythematosus ([facility] ) 2014     She  has a past surgical history that includes Appendectomy;  section; Knee arthroscopy (Right); Carpal tunnel release; Myomectomy; Tubal ligation (); Myomectomy; and Breast biopsy (Left, 2018)  Her family history includes Cancer in her paternal grandmother; Diabetes in her father; Hypertension in her father; Other in her mother and sister; Spina bifida in her mother  She  reports that she has never smoked  She has never used smokeless tobacco  She reports current alcohol use  She reports current drug use  Frequency: 4 00 times per week  Drug: Marijuana  Current Outpatient Medications   Medication Sig Dispense Refill    Black Cohosh 540 MG CAPS Take by mouth      buPROPion (WELLBUTRIN XL) 300 mg 24 hr tablet TAKE 1 TABLET BY MOUTH EVERY DAY 90 tablet 1    ergocalciferol (VITAMIN D2) 50,000 units Take 50,000 Units by mouth once a week      lidocaine (Lidoderm) 5 % Lidoderm 5 % topical patch   APPLY 1 PATCH BY TOPICAL ROUTE ONCE DAILY (MAY WEAR UP TO 12HOURS )      Misc Natural Products (BLACK CHERRY CONCENTRATE PO) Take by mouth      Turmeric Curcumin 500 MG CAPS Take by mouth      aspirin 81 mg chewable tablet Chew 81 mg daily      GLUCOSA-CHONDR-NA CHONDR-MSM PO Take by mouth       No current facility-administered medications for this visit        Current Outpatient Medications on File Prior to Visit   Medication Sig    Black Cohosh 540 MG CAPS Take by mouth    buPROPion (WELLBUTRIN XL) 300 mg 24 hr tablet TAKE 1 TABLET BY MOUTH EVERY DAY    ergocalciferol (VITAMIN D2) 50,000 units Take 50,000 Units by mouth once a week  lidocaine (Lidoderm) 5 % Lidoderm 5 % topical patch   APPLY 1 PATCH BY TOPICAL ROUTE ONCE DAILY (MAY WEAR UP TO 12HOURS )    Misc Natural Products (BLACK CHERRY CONCENTRATE PO) Take by mouth    Turmeric Curcumin 500 MG CAPS Take by mouth    aspirin 81 mg chewable tablet Chew 81 mg daily    GLUCOSA-CHONDR-NA CHONDR-MSM PO Take by mouth     No current facility-administered medications on file prior to visit  She is allergic to acetaminophen-codeine; codeine sulfate; hydrocodone; iodinated diagnostic agents; iodine; morphine; and oxycodone       Review of Systems   Constitutional: Negative for activity change, appetite change, chills, fatigue and fever  HENT: Negative for rhinorrhea, sneezing and sore throat  Eyes: Negative for visual disturbance  Respiratory: Negative for cough, shortness of breath and wheezing  Cardiovascular: Negative for chest pain, palpitations and leg swelling  Gastrointestinal: Negative for abdominal distention, constipation, diarrhea, nausea and vomiting  Genitourinary: Negative for difficulty urinating, frequency and urgency  Neurological: Negative for syncope and light-headedness  Objective:      /80   Wt 77 1 kg (170 lb)   LMP 01/18/2021 Comment:  Bleeding   BMI 32 12 kg/m²          Physical Exam  Constitutional:       General: She is not in acute distress  Appearance: She is well-developed  She is not diaphoretic  Cardiovascular:      Rate and Rhythm: Normal rate and regular rhythm  Heart sounds: Normal heart sounds  No murmur  No friction rub  No gallop  Pulmonary:      Effort: Pulmonary effort is normal  No respiratory distress  Breath sounds: Normal breath sounds  Abdominal:      General: Bowel sounds are normal  There is no distension  Palpations: Abdomen is soft  Tenderness: There is no abdominal tenderness  There is no guarding or rebound     Neurological:      Mental Status: She is alert and oriented to person, place, and time  Deep Tendon Reflexes: Reflexes are normal and symmetric

## 2021-02-17 ENCOUNTER — LAB (OUTPATIENT)
Dept: LAB | Facility: CLINIC | Age: 51
End: 2021-02-17
Payer: COMMERCIAL

## 2021-02-17 ENCOUNTER — TELEPHONE (OUTPATIENT)
Dept: INTERNAL MEDICINE CLINIC | Facility: CLINIC | Age: 51
End: 2021-02-17

## 2021-02-17 DIAGNOSIS — Z01.818 PRE-OP TESTING: ICD-10-CM

## 2021-02-17 LAB
ERYTHROCYTE [DISTWIDTH] IN BLOOD BY AUTOMATED COUNT: 12.9 % (ref 11.6–15.1)
HCT VFR BLD AUTO: 40 % (ref 34.8–46.1)
HGB BLD-MCNC: 13 G/DL (ref 11.5–15.4)
MCH RBC QN AUTO: 31.6 PG (ref 26.8–34.3)
MCHC RBC AUTO-ENTMCNC: 32.5 G/DL (ref 31.4–37.4)
MCV RBC AUTO: 97 FL (ref 82–98)
PLATELET # BLD AUTO: 193 THOUSANDS/UL (ref 149–390)
PMV BLD AUTO: 12.5 FL (ref 8.9–12.7)
RBC # BLD AUTO: 4.12 MILLION/UL (ref 3.81–5.12)
WBC # BLD AUTO: 6.72 THOUSAND/UL (ref 4.31–10.16)

## 2021-02-17 PROCEDURE — 36415 COLL VENOUS BLD VENIPUNCTURE: CPT

## 2021-02-17 PROCEDURE — 85027 COMPLETE CBC AUTOMATED: CPT

## 2021-02-18 ENCOUNTER — TRANSCRIBE ORDERS (OUTPATIENT)
Dept: ADMINISTRATIVE | Facility: HOSPITAL | Age: 51
End: 2021-02-18

## 2021-02-18 DIAGNOSIS — Z01.818 PREOPERATIVE EXAMINATION, UNSPECIFIED: Primary | ICD-10-CM

## 2021-02-24 ENCOUNTER — TELEPHONE (OUTPATIENT)
Dept: OBGYN CLINIC | Facility: CLINIC | Age: 51
End: 2021-02-24

## 2021-02-24 NOTE — TELEPHONE ENCOUNTER
IT shouldn't  Some patients are stating that after vaccination they are experiencing muscle aches, low grade fevers we just have to be aware that this may occur  One source recommended a 3 day difference in vaccine to surgery time- if she can change the date that would be perfect but if not no reason to reschedule

## 2021-02-24 NOTE — TELEPHONE ENCOUNTER
Patient is scheduled for surgery 02/26, her COVID vaccine is scheduled 02/25 patient is wondering if this is going to pose any sort of problem for her surgery

## 2021-02-24 NOTE — TELEPHONE ENCOUNTER
Just a quick JALEESAI  Called patient she is aware and said that she can not re schedule her shot, she said that she was fine with the first dose so she is hoping she feels ok with the second dose

## 2021-02-25 ENCOUNTER — ANESTHESIA EVENT (OUTPATIENT)
Dept: PERIOP | Facility: HOSPITAL | Age: 51
End: 2021-02-25
Payer: COMMERCIAL

## 2021-02-25 NOTE — PRE-PROCEDURE INSTRUCTIONS
Pre-Surgery Instructions:   Medication Instructions    Black Cohosh 540 MG CAPS hold till after surgery    buPROPion (WELLBUTRIN XL) 300 mg 24 hr tablet take day of surgery    ergocalciferol (VITAMIN D2) 50,000 units hold till after surgery    lidocaine (Lidoderm) 5 % hold day of surgery    Misc Natural Products (BLACK CHERRY CONCENTRATE PO) hold till after surgery    Turmeric Curcumin 500 MG CAPS hold till after surgery    My Surgical Experience    The following information was developed to assist you to prepare for your operation  What do I need to do before coming to the hospital?   Arrange for a responsible person to drive you to and from the hospital    Arrange care for your children at home  Children are not allowed in the recovery areas of the hospital   Plan to wear clothing that is easy to put on and take off  If you are having shoulder surgery, wear a shirt that buttons or zippers in the front  Bathing  o Shower the evening before and the morning of your surgery with an antibacterial soap  Please refer to the Pre Op Showering Instructions for Surgery Patients Sheet   o Remove nail polish and all body piercing jewelry  o Do not shave any body part for at least 24 hours before surgery-this includes face, arms, legs and upper body  Food  o Nothing to eat or drink after midnight the night before your surgery  This includes candy and chewing gum  o Exception: If your surgery is after 12:00pm (noon), you may have clear liquids such as 7-Up®, ginger ale, apple or cranberry juice, Jell-O®, water, or clear broth until 8:00 am  o Do not drink milk or juice with pulp on the morning before surgery  o Do not drink alcohol 24 hours before surgery  Medicine  o Follow instructions you received from your surgeon about which medicines you may take on the day of surgery  o If instructed to take medicine on the morning of surgery, take pills with just a small sip of water   Call your prescribing doctor for specific infroamtion on what to do if you take insulin    What should I bring to the hospital?    Bring:  Rishichrista Alexander or a walker, if you have them, for foot or knee surgery   A list of the daily medicines, vitamins, minerals, herbals and nutritional supplements you take  Include the dosages of medicines and the time you take them each day   Glasses, dentures or hearing aids   Minimal clothing; you will be wearing hospital sleepwear   Photo ID; required to verify your identity   If you have a Living Will or Power of , bring a copy of the documents   If you have an ostomy, bring an extra pouch and any supplies you use    Do not bring   Medicines or inhalers   Money, valuables or jewelry    What other information should I know about the day of surgery?  Notify your surgeons if you develop a cold, sore throat, cough, fever, rash or any other illness   Report to the Ambulatory Surgical/Same Day Surgery Unit   You will be instructed to stop at Registration only if you have not been pre-registered   Inform your  fi they do not stay that they will be asked by the staff to leave a phone number where they can be reached   Be available to be reached before surgery  In the event the operating room schedule changes, you may be asked to come in earlier or later than expected    *It is important to tell your doctor and others involved in your health care if you are taking or have been taking any non-prescription drugs, vitamins, minerals, herbals or other nutritional supplements   Any of these may interact with some food or medicines and cause a reaction

## 2021-02-26 ENCOUNTER — ANESTHESIA (OUTPATIENT)
Dept: PERIOP | Facility: HOSPITAL | Age: 51
End: 2021-02-26
Payer: COMMERCIAL

## 2021-02-26 ENCOUNTER — HOSPITAL ENCOUNTER (OUTPATIENT)
Facility: HOSPITAL | Age: 51
Setting detail: OUTPATIENT SURGERY
Discharge: HOME | End: 2021-02-26
Attending: OBSTETRICS & GYNECOLOGY | Admitting: OBSTETRICS & GYNECOLOGY
Payer: COMMERCIAL

## 2021-02-26 VITALS
OXYGEN SATURATION: 94 % | SYSTOLIC BLOOD PRESSURE: 95 MMHG | DIASTOLIC BLOOD PRESSURE: 54 MMHG | BODY MASS INDEX: 31.22 KG/M2 | HEIGHT: 61 IN | HEART RATE: 79 BPM | TEMPERATURE: 99 F | WEIGHT: 165.34 LBS | RESPIRATION RATE: 19 BRPM

## 2021-02-26 DIAGNOSIS — D25.0 SUBMUCOUS UTERINE FIBROID: ICD-10-CM

## 2021-02-26 DIAGNOSIS — N95.0 PMB (POSTMENOPAUSAL BLEEDING): ICD-10-CM

## 2021-02-26 PROCEDURE — 58558 HYSTEROSCOPY BIOPSY: CPT | Performed by: OBSTETRICS & GYNECOLOGY

## 2021-02-26 PROCEDURE — 88305 TISSUE EXAM BY PATHOLOGIST: CPT | Performed by: PATHOLOGY

## 2021-02-26 RX ORDER — PROPOFOL 10 MG/ML
INJECTION, EMULSION INTRAVENOUS AS NEEDED
Status: DISCONTINUED | OUTPATIENT
Start: 2021-02-26 | End: 2021-02-26

## 2021-02-26 RX ORDER — FENTANYL CITRATE 50 UG/ML
INJECTION, SOLUTION INTRAMUSCULAR; INTRAVENOUS AS NEEDED
Status: DISCONTINUED | OUTPATIENT
Start: 2021-02-26 | End: 2021-02-26

## 2021-02-26 RX ORDER — TRANEXAMIC ACID 100 MG/ML
INJECTION, SOLUTION INTRAVENOUS AS NEEDED
Status: DISCONTINUED | OUTPATIENT
Start: 2021-02-26 | End: 2021-02-26

## 2021-02-26 RX ORDER — MIDAZOLAM HYDROCHLORIDE 2 MG/2ML
INJECTION, SOLUTION INTRAMUSCULAR; INTRAVENOUS AS NEEDED
Status: DISCONTINUED | OUTPATIENT
Start: 2021-02-26 | End: 2021-02-26

## 2021-02-26 RX ORDER — LIDOCAINE HYDROCHLORIDE AND EPINEPHRINE 10; 10 MG/ML; UG/ML
INJECTION, SOLUTION INFILTRATION; PERINEURAL AS NEEDED
Status: DISCONTINUED | OUTPATIENT
Start: 2021-02-26 | End: 2021-02-26 | Stop reason: HOSPADM

## 2021-02-26 RX ORDER — DIPHENHYDRAMINE HYDROCHLORIDE 50 MG/ML
12.5 INJECTION INTRAMUSCULAR; INTRAVENOUS ONCE AS NEEDED
Status: DISCONTINUED | OUTPATIENT
Start: 2021-02-26 | End: 2021-02-26 | Stop reason: HOSPADM

## 2021-02-26 RX ORDER — ONDANSETRON 2 MG/ML
4 INJECTION INTRAMUSCULAR; INTRAVENOUS EVERY 6 HOURS PRN
Status: CANCELLED | OUTPATIENT
Start: 2021-02-26

## 2021-02-26 RX ORDER — SODIUM CHLORIDE, SODIUM LACTATE, POTASSIUM CHLORIDE, CALCIUM CHLORIDE 600; 310; 30; 20 MG/100ML; MG/100ML; MG/100ML; MG/100ML
125 INJECTION, SOLUTION INTRAVENOUS CONTINUOUS
Status: CANCELLED | OUTPATIENT
Start: 2021-02-26

## 2021-02-26 RX ORDER — METOCLOPRAMIDE HYDROCHLORIDE 5 MG/ML
10 INJECTION INTRAMUSCULAR; INTRAVENOUS ONCE AS NEEDED
Status: DISCONTINUED | OUTPATIENT
Start: 2021-02-26 | End: 2021-02-26 | Stop reason: HOSPADM

## 2021-02-26 RX ORDER — ONDANSETRON 2 MG/ML
INJECTION INTRAMUSCULAR; INTRAVENOUS AS NEEDED
Status: DISCONTINUED | OUTPATIENT
Start: 2021-02-26 | End: 2021-02-26

## 2021-02-26 RX ORDER — LIDOCAINE HYDROCHLORIDE 10 MG/ML
INJECTION, SOLUTION EPIDURAL; INFILTRATION; INTRACAUDAL; PERINEURAL AS NEEDED
Status: DISCONTINUED | OUTPATIENT
Start: 2021-02-26 | End: 2021-02-26

## 2021-02-26 RX ORDER — SODIUM CHLORIDE, SODIUM LACTATE, POTASSIUM CHLORIDE, CALCIUM CHLORIDE 600; 310; 30; 20 MG/100ML; MG/100ML; MG/100ML; MG/100ML
50 INJECTION, SOLUTION INTRAVENOUS CONTINUOUS
Status: DISCONTINUED | OUTPATIENT
Start: 2021-02-26 | End: 2021-02-26 | Stop reason: HOSPADM

## 2021-02-26 RX ORDER — DEXAMETHASONE SODIUM PHOSPHATE 4 MG/ML
INJECTION, SOLUTION INTRA-ARTICULAR; INTRALESIONAL; INTRAMUSCULAR; INTRAVENOUS; SOFT TISSUE AS NEEDED
Status: DISCONTINUED | OUTPATIENT
Start: 2021-02-26 | End: 2021-02-26

## 2021-02-26 RX ORDER — IBUPROFEN 600 MG/1
600 TABLET ORAL EVERY 6 HOURS PRN
Status: DISCONTINUED | OUTPATIENT
Start: 2021-02-26 | End: 2021-02-26 | Stop reason: HOSPADM

## 2021-02-26 RX ORDER — FENTANYL CITRATE/PF 50 MCG/ML
50 SYRINGE (ML) INJECTION
Status: DISCONTINUED | OUTPATIENT
Start: 2021-02-26 | End: 2021-02-26 | Stop reason: HOSPADM

## 2021-02-26 RX ORDER — LIDOCAINE HYDROCHLORIDE 10 MG/ML
0.5 INJECTION, SOLUTION EPIDURAL; INFILTRATION; INTRACAUDAL; PERINEURAL ONCE AS NEEDED
Status: DISCONTINUED | OUTPATIENT
Start: 2021-02-26 | End: 2021-02-26 | Stop reason: HOSPADM

## 2021-02-26 RX ORDER — ONDANSETRON 2 MG/ML
4 INJECTION INTRAMUSCULAR; INTRAVENOUS ONCE AS NEEDED
Status: DISCONTINUED | OUTPATIENT
Start: 2021-02-26 | End: 2021-02-26 | Stop reason: HOSPADM

## 2021-02-26 RX ADMIN — FENTANYL CITRATE 50 MCG: 50 INJECTION, SOLUTION INTRAMUSCULAR; INTRAVENOUS at 11:06

## 2021-02-26 RX ADMIN — MIDAZOLAM HYDROCHLORIDE 2 MG: 1 INJECTION, SOLUTION INTRAMUSCULAR; INTRAVENOUS at 10:44

## 2021-02-26 RX ADMIN — TRANEXAMIC ACID 1000 MG: 100 INJECTION, SOLUTION INTRAVENOUS at 10:53

## 2021-02-26 RX ADMIN — PROPOFOL 200 MG: 10 INJECTION, EMULSION INTRAVENOUS at 10:48

## 2021-02-26 RX ADMIN — DEXAMETHASONE SODIUM PHOSPHATE 4 MG: 4 INJECTION, SOLUTION INTRAMUSCULAR; INTRAVENOUS at 10:49

## 2021-02-26 RX ADMIN — ONDANSETRON 4 MG: 2 INJECTION INTRAMUSCULAR; INTRAVENOUS at 11:06

## 2021-02-26 RX ADMIN — LIDOCAINE HYDROCHLORIDE 50 MG: 10 INJECTION, SOLUTION EPIDURAL; INFILTRATION; INTRACAUDAL; PERINEURAL at 10:47

## 2021-02-26 RX ADMIN — SODIUM CHLORIDE, SODIUM LACTATE, POTASSIUM CHLORIDE, AND CALCIUM CHLORIDE 50 ML/HR: .6; .31; .03; .02 INJECTION, SOLUTION INTRAVENOUS at 09:43

## 2021-02-26 RX ADMIN — FENTANYL CITRATE 50 MCG: 50 INJECTION, SOLUTION INTRAMUSCULAR; INTRAVENOUS at 10:57

## 2021-02-26 NOTE — INTERVAL H&P NOTE
H&P reviewed  After examining the patient I find no changes in the patients condition since the H&P had been written      Vitals:    02/26/21 0925   BP: 110/68   Pulse: 77   Resp: 16   Temp: 100 4 °F (38 °C)   SpO2: 98%

## 2021-02-26 NOTE — ANESTHESIA PREPROCEDURE EVALUATION
Procedure:  DILATATION AND CURETTAGE (D&C) WITH HYSTEROSCOPY (N/A Uterus)  MYOMECTOMY (N/A Vagina )    Relevant Problems   CARDIO   (+) Mixed hyperlipidemia      MUSCULOSKELETAL   (+) Systemic lupus erythematosus (HCC)      NEURO/PSYCH   (+) Anxiety   (+) Recurrent major depressive disorder, in partial remission (HCC)      Other   (+) Fibroids, submucosal   (+) Obesity, Class I, BMI 30-34 9   (+) Postmenopausal bleeding        Physical Exam    Airway    Mallampati score: I  TM Distance: >3 FB  Neck ROM: full     Dental   No notable dental hx     Cardiovascular      Pulmonary      Other Findings        Anesthesia Plan  ASA Score- 2     Anesthesia Type- general with ASA Monitors  Additional Monitors:   Airway Plan: LMA  Plan Factors-Exercise tolerance (METS): >4 METS  Exercise comment: Runs, able to climb two flights of stairs without cardiopulmonary limitation  Chart reviewed  Existing labs reviewed  Patient summary reviewed  Induction- intravenous  Postoperative Plan- Plan for postoperative opioid use  Planned trial extubation    Informed Consent- Anesthetic plan and risks discussed with patient

## 2021-02-26 NOTE — DISCHARGE INSTRUCTIONS
Hysteroscopy   WHAT YOU SHOULD KNOW:   A hysteroscopy is a procedure to look at the inside of your uterus  Other procedures may also be done during the hysteroscopy  AFTER YOU LEAVE:   Medicines:   · Acetaminophen  decreases pain  It is available without a doctor's order  Ask how much to take and how often to take it  Follow directions  Acetaminophen can cause liver damage if not taken correctly  · NSAIDs  help decrease swelling and pain  This medicine is available with or without a doctor's order  NSAIDs can cause stomach bleeding or kidney problems in certain people  If you take blood thinner medicine, always ask your primary healthcare provider (PHP) if NSAIDs are safe for you  Always read the medicine label and follow directions  · Take your medicine as directed  Contact your PHP if you think your medicine is not helping or if you have side effects  Tell him if you are allergic to any medicine  Keep a list of the medicines, vitamins, and herbs you take  Include the amounts, and when and why you take them  Bring the list or the pill bottles to follow-up visits  Carry your medicine list with you in case of an emergency  Follow up with your PHP or gynecologist as directed:  Write down your questions so you remember to ask them during your visits  Activity guidelines: You may feel like resting more after the procedure  Slowly start to do more each day  Rest when you feel it is needed  Ask your PHP when you can return to your daily activities  Self-care:   · Do not put anything into your vagina until your PHP says it is okay  Do not have sex, douche, or use tampons  · You may need to continue to wear a sanitary pad for up to a week  You may have light bleeding or spotting  Contact your PHP if:   · You have a fever  · You have to change your sanitary pad every hour  · You have chills, a cough, or feel weak and achy  · You have abdominal pain that does not go away      · You have abnormal or foul-smelling vaginal discharge  · Your skin is itchy, swollen, or you have a rash  · You have questions or concerns about your condition or care  © 2014 [address] is for End User's use only and may not be sold, redistributed or otherwise used for commercial purposes  All illustrations and images included in CareNotes® are the copyrighted property of A D [employer] M [employer] Inc  or [employer]  The above information is an  only  It is not intended as medical advice for individual conditions or treatments  Talk to your doctor, nurse or pharmacist before following any medical regimen to see if it is safe and effective for you

## 2021-02-26 NOTE — OP NOTE
OPERATIVE REPORT  PATIENT NAME: Trace Reyes    :  1970  MRN: [id number]  Pt Location: [facility] OR ROOM 03    SURGERY DATE: 2021    Surgeon(s) and Role:     * Antonieta Frey MD - Primary    Preop Diagnosis:  PMB (postmenopausal bleeding) [N95 0]  Submucous uterine fibroid [D25 0]    Post-Op Diagnosis Codes:     * PMB (postmenopausal bleeding) [N95 0]     * Submucous uterine fibroid [D25 0]    Procedure(s) (LRB):  DILATATION AND CURETTAGE (D&C) WITH HYSTEROSCOPY (N/A)  MYOMECTOMY (N/A)    Specimen(s):  ID Type Source Tests Collected by Time Destination   1 : [facility] Endometrium TISSUE EXAM Antonieta Frey MD 2021 1104        Estimated Blood Loss:   Minimal    Drains:  * No LDAs found *    Anesthesia Type:   General    Operative Indications:  PMB (postmenopausal bleeding) [N95 0]  Submucous uterine fibroid [D25 0]    Operative Findings:  Grossly normal external genitalia  Grossly normal cervix  Atrophic endometrium, without polyp or fibroid visualized    Complications:   None    Procedure and Technique:    Brief History    All risks, benefits, and alternatives to the procedure were discussed with the patient and she had the opportunity to ask questions  Informed consent was obtained  Description of Procedure    Patient was taken to the operating room were a time out was performed to confirm correct patient and correct procedure  General LMA anesthesia (LMA) was administered and the patient was positioned on the OR table in the dorsal lithotomy position  All pressure points were padded and a shani hugger was placed to maintain control of core body temperature  A bimanual exam was performed and the uterus was noted to be anteverted, normal in size and consistency with no palpable adnexal masses or fullness  The patient was prepped and draped in the usual sterile fashion      Operative Technique  A weighted speculum was inserted into the vagina and a Dionisio retractor was used to visualize the anterior lip of the cervix, which was then grasped with a single toothed tenaculum  The uterus was sounded to 8 cm  The cervix was serially dilated to 23French using Guerra dilators for introduction of the hysteroscope  Hysteroscope was introduced under direct visualization using normal saline solution as the distention media  Hysteroscope was advanced to the uterine fundus and the entire uterine cavity was inspected in a systematic manner  There was noted to be findings as noted above  Hysteroscope was withdrawn and sharp curetting was performed, starting at the 12'oclock position and rotating a total of 360 degrees to cover all surfaces  Endometrial tissue was obtained and sent for pathology  The single toothed tenaculum was removed from the anterior lip of the cervix  Good hemostasis was confirmed at the tenaculum puncture sites  Weighted speculum was then removed from the vagina  At the conclusion of the procedure, all needle, sponge, and instrument counts were noted to be correct x2  Patient tolerated the procedure well and was transferred to PACU in stable condition prior to discharge with follow up in 1-2 weeks  Dr Daquan Villa was present and participated in all key portions of the case      I was present for the entire procedure    Patient Disposition:  PACU     SIGNATURE: Suzanne Chadwick DO  DATE: February 26, 2021  TIME: 11:05 AM

## 2021-02-26 NOTE — ANESTHESIA POSTPROCEDURE EVALUATION
Post-Op Assessment Note    CV Status:  Stable  Pain Score: 0    Pain management: adequate     Mental Status:  Sleepy   Hydration Status:  Euvolemic   PONV Controlled:  Controlled   Airway Patency:  Patent      Post Op Vitals Reviewed: Yes      Staff: CRNA         No complications documented      /65 (02/26/21 1115)    Temp 98 5 °F (36 9 °C) (02/26/21 1115)    Pulse 92 (02/26/21 1115)   Resp (!) 24 (02/26/21 1115)    SpO2 94 % (02/26/21 1115)

## 2021-03-10 DIAGNOSIS — Z23 ENCOUNTER FOR IMMUNIZATION: ICD-10-CM

## 2021-04-05 ENCOUNTER — OFFICE VISIT (OUTPATIENT)
Dept: INTERNAL MEDICINE CLINIC | Facility: CLINIC | Age: 51
End: 2021-04-05
Payer: COMMERCIAL

## 2021-04-05 ENCOUNTER — APPOINTMENT (OUTPATIENT)
Dept: LAB | Facility: CLINIC | Age: 51
End: 2021-04-05
Payer: COMMERCIAL

## 2021-04-05 VITALS
DIASTOLIC BLOOD PRESSURE: 64 MMHG | BODY MASS INDEX: 31.15 KG/M2 | HEIGHT: 61 IN | HEART RATE: 65 BPM | OXYGEN SATURATION: 98 % | TEMPERATURE: 96.1 F | SYSTOLIC BLOOD PRESSURE: 118 MMHG | WEIGHT: 165 LBS

## 2021-04-05 DIAGNOSIS — R39.9 UTI SYMPTOMS: ICD-10-CM

## 2021-04-05 DIAGNOSIS — Z11.59 ENCOUNTER FOR HEPATITIS C SCREENING TEST FOR LOW RISK PATIENT: ICD-10-CM

## 2021-04-05 DIAGNOSIS — Z11.4 ENCOUNTER FOR SCREENING FOR HIV: ICD-10-CM

## 2021-04-05 DIAGNOSIS — M32.9 SYSTEMIC LUPUS ERYTHEMATOSUS, UNSPECIFIED SLE TYPE, UNSPECIFIED ORGAN INVOLVEMENT STATUS (HCC): ICD-10-CM

## 2021-04-05 DIAGNOSIS — M32.9 SYSTEMIC LUPUS ERYTHEMATOSUS, UNSPECIFIED SLE TYPE, UNSPECIFIED ORGAN INVOLVEMENT STATUS (HCC): Primary | ICD-10-CM

## 2021-04-05 LAB
BILIRUB UR QL STRIP: NEGATIVE
CLARITY UR: CLEAR
COLOR UR: YELLOW
GLUCOSE UR STRIP-MCNC: NEGATIVE MG/DL
HGB UR QL STRIP.AUTO: NEGATIVE
KETONES UR STRIP-MCNC: ABNORMAL MG/DL
LEUKOCYTE ESTERASE UR QL STRIP: NEGATIVE
NITRITE UR QL STRIP: NEGATIVE
PH UR STRIP.AUTO: 5.5 [PH]
PROT UR STRIP-MCNC: NEGATIVE MG/DL
SP GR UR STRIP.AUTO: 1.03 (ref 1–1.03)
UROBILINOGEN UR QL STRIP.AUTO: 0.2 E.U./DL

## 2021-04-05 PROCEDURE — 1036F TOBACCO NON-USER: CPT | Performed by: INTERNAL MEDICINE

## 2021-04-05 PROCEDURE — 87086 URINE CULTURE/COLONY COUNT: CPT

## 2021-04-05 PROCEDURE — 81003 URINALYSIS AUTO W/O SCOPE: CPT | Performed by: INTERNAL MEDICINE

## 2021-04-05 PROCEDURE — 80048 BASIC METABOLIC PNL TOTAL CA: CPT

## 2021-04-05 PROCEDURE — 99215 OFFICE O/P EST HI 40 MIN: CPT | Performed by: INTERNAL MEDICINE

## 2021-04-05 PROCEDURE — 3008F BODY MASS INDEX DOCD: CPT | Performed by: INTERNAL MEDICINE

## 2021-04-05 PROCEDURE — 87389 HIV-1 AG W/HIV-1&-2 AB AG IA: CPT

## 2021-04-05 PROCEDURE — 36415 COLL VENOUS BLD VENIPUNCTURE: CPT

## 2021-04-05 PROCEDURE — 86803 HEPATITIS C AB TEST: CPT

## 2021-04-05 NOTE — PROGRESS NOTES
BMI Counseling: Body mass index is 31 18 kg/m²  The BMI is above normal  Nutrition recommendations include moderation in carbohydrate intake

## 2021-04-05 NOTE — PATIENT INSTRUCTIONS
A patient with lupus currently with minimal symptoms but with symptoms suggesting urinary tract infection  Urinalysis and culture  Baseline basic metabolic panel  Given autoimmune illness, I would like to have on the chart hep C and HIV studies  Further recommendations will depend upon results of the urinalysis     Vitamin-C 500 mg daily recommended for the skin  Schedule revisit on or around the time of her birthday

## 2021-04-05 NOTE — PROGRESS NOTES
Assessment/Plan:       Diagnoses and all orders for this visit:    Systemic lupus erythematosus, unspecified SLE type, unspecified organ involvement status ([facility] [address] )  -     Basic metabolic panel; Future    UTI symptoms  -     UA (URINE) with reflex to Scope  -     Urine culture; Future    Encounter for hepatitis C screening test for low risk patient  -     Hepatitis C antibody; Future    Encounter for screening for HIV  -     HIV 1/2 Antigen/Antibody (4th Generation) w Reflex [facility]; Future                Subjective:      Patient ID: Derek Greer is a 48 y o  female  A 80-year-old female who has lupus  Initial diagnosis 2014; the patient presented to Neurology with headache  Abnormal findings on brain MRI prompted the diagnosis  Wants to initiate primary care; season Endocrinology physician in the [facility] reason  Currently not on any specific anti lupus anti-inflammatory agent  In the past, she had been given Plaquenil but failed it due to weight gain and lack of efficacy    So she has chronic joint pains  Raynaud's phenomenon, even when she puts her hand in the refrigerator at home! 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[employer], [place]  My surgical history:  Myomectomy  Uterine myectomy  2 C sections  Right knee arthroscopy  Bilateral carpal tunnel    , lives with her   1 child 14 and 1 child 12  Minimal alcohol  No hard drugs  Medical marijuana occasionally  Parents both alive  Father with diabetes and hypertension  Mom with morbid obesity and high blood pressure  However, the patient is the only 1 in the family with autoimmune illness  Had a colonoscopy about a month ago    Perfectly normal   No family history so next colonoscopy in 10 years but interval Cologuard in 3 years  A mammogram has been scheduled  Allergies:  IVP dye, and hives caused by narcotics      The following portions of the patient's history were reviewed and updated as appropriate:   She has a past medical history of Anxiety, Chronic pain of right knee (2016), Depression, Migrainous headache without aura (2016), Raynaud disease, Rheumatoid arthritis ([facility] [address] ), and Sjogren's disease ([facility] [address] )  ,  does not have any pertinent problems on file  ,   has a past surgical history that includes Appendectomy;  section; Knee arthroscopy (Right); Myomectomy; Tubal ligation (); Myomectomy; Breast biopsy (Left, 2018); Carpal tunnel release (Bilateral); pr hysteroscopy,w/endo bx (N/A, 2021); and Myomectomy (N/A, 2021)  ,  family history includes Cancer in her paternal grandmother; Diabetes in her father; Hypertension in her father; Other in her mother and sister; Spina bifida in her mother  ,   reports that she has never smoked  She has never used smokeless tobacco  She reports current alcohol use  She reports current drug use  Frequency: 4 00 times per week  Drug: Marijuana  ,  is allergic to acetaminophen-codeine; codeine sulfate; hydrocodone; iodinated diagnostic agents; iodine - food allergy; morphine; and oxycodone     Current Outpatient Medications   Medication Sig Dispense Refill    Black Cohosh 540 MG CAPS Take by mouth      buPROPion (WELLBUTRIN XL) 300 mg 24 hr tablet TAKE 1 TABLET BY MOUTH EVERY DAY 90 tablet 1    ergocalciferol (VITAMIN D2) 50,000 units Take 50,000 Units by mouth once a week      lidocaine (Lidoderm) 5 % Lidoderm 5 % topical patch   APPLY 1 PATCH BY TOPICAL ROUTE ONCE DAILY (MAY WEAR UP TO 12HOURS )      Misc Natural Products (BLACK CHERRY CONCENTRATE PO) Take by mouth      Turmeric Curcumin 500 MG CAPS Take by mouth       No current facility-administered medications for this visit          Review of Systems   Genitourinary: Positive for difficulty urinating and dysuria  Musculoskeletal: Positive for arthralgias  Skin: Positive for rash  Objective:  Vitals:    04/05/21 1624   BP: 118/64   Pulse: 65   Temp: (!) 96 1 °F (35 6 °C)   SpO2: 98%      Physical Exam  Constitutional:       Appearance: She is well-developed  Comments:   A female patient who appears to be the stated age, modestly overweight  HENT:      Head: Normocephalic and atraumatic  Eyes:      Pupils: Pupils are equal, round, and reactive to light  Neck:      Musculoskeletal: Normal range of motion and neck supple  Thyroid: No thyromegaly  Trachea: No tracheal deviation  Cardiovascular:      Rate and Rhythm: Normal rate and regular rhythm  Heart sounds: Normal heart sounds  No murmur  No gallop  Pulmonary:      Effort: No respiratory distress  Breath sounds: No wheezing or rales  Abdominal:      General: Bowel sounds are normal       Palpations: Abdomen is soft  Tenderness: There is no abdominal tenderness  Musculoskeletal: Normal range of motion  General: No tenderness or deformity  Skin:     General: Skin is warm  Coloration: Skin is pale  Comments:   Her skin appears thin   Neurological:      Mental Status: She is alert and oriented to person, place, and time  Coordination: Coordination normal    Psychiatric:         Judgment: Judgment normal            Patient Instructions    A patient with lupus currently with minimal symptoms but with symptoms suggesting urinary tract infection  Urinalysis and culture  Baseline basic metabolic panel  Given autoimmune illness, I would like to have on the chart hep C and HIV studies  Further recommendations will depend upon results of the urinalysis     Vitamin-C 500 mg daily recommended for the skin  Schedule revisit on or around the time of her birthday

## 2021-04-06 LAB
ANION GAP SERPL CALCULATED.3IONS-SCNC: 5 MMOL/L (ref 4–13)
BACTERIA UR CULT: NORMAL
BUN SERPL-MCNC: 13 MG/DL (ref 5–25)
CALCIUM SERPL-MCNC: 9.1 MG/DL (ref 8.3–10.1)
CHLORIDE SERPL-SCNC: 107 MMOL/L (ref 100–108)
CO2 SERPL-SCNC: 27 MMOL/L (ref 21–32)
CREAT SERPL-MCNC: 0.68 MG/DL (ref 0.6–1.3)
GFR SERPL CREATININE-BSD FRML MDRD: 102 ML/MIN/1.73SQ M
GLUCOSE SERPL-MCNC: 90 MG/DL (ref 65–140)
HCV AB SER QL: NORMAL
HIV 1+2 AB+HIV1 P24 AG SERPL QL IA: NORMAL
POTASSIUM SERPL-SCNC: 4 MMOL/L (ref 3.5–5.3)
SODIUM SERPL-SCNC: 139 MMOL/L (ref 136–145)

## 2021-06-16 ENCOUNTER — RA CDI HCC (OUTPATIENT)
Dept: OTHER | Facility: HOSPITAL | Age: 51
End: 2021-06-16

## 2021-06-16 NOTE — PROGRESS NOTES
[place] [id number]  coding opportunities             Chart reviewed, (number of) suggestions sent to provider: 1     Problem listed updated  Provider Accepted, (number of) suggestions accepted: 1               Patients insurance company: [employer] (Medicare Advantage and [employer])           Re: Riki Smith    Based on clinical documentation indicated in the medical record, it appears that the patient may have the following condition:    F33 41 - Recurrent major depressive disorder, in partial remission    If this is correct, please document, assess, and code at your next visit on 6/23/2021    [place] [address]  coding opportunities             Chart reviewed, (number of) suggestions sent to provider: 1                  Patients insurance company: [employer] (Medicare Advantage and [employer])

## 2021-06-23 ENCOUNTER — HOSPITAL ENCOUNTER (OUTPATIENT)
Dept: MAMMOGRAPHY | Facility: CLINIC | Age: 51
Discharge: HOME | End: 2021-06-23
Payer: COMMERCIAL

## 2021-06-23 VITALS — WEIGHT: 165 LBS | BODY MASS INDEX: 31.15 KG/M2 | HEIGHT: 61 IN

## 2021-06-23 DIAGNOSIS — Z12.31 SCREENING MAMMOGRAM, ENCOUNTER FOR: ICD-10-CM

## 2021-06-23 PROCEDURE — 77067 SCR MAMMO BI INCL CAD: CPT

## 2021-06-23 PROCEDURE — 77063 BREAST TOMOSYNTHESIS BI: CPT

## 2021-06-29 ENCOUNTER — HOSPITAL ENCOUNTER (OUTPATIENT)
Dept: ULTRASOUND IMAGING | Facility: CLINIC | Age: 51
Discharge: HOME | End: 2021-06-29
Payer: COMMERCIAL

## 2021-06-29 DIAGNOSIS — R92.8 ABNORMAL MAMMOGRAM: ICD-10-CM

## 2021-06-29 PROCEDURE — 76642 ULTRASOUND BREAST LIMITED: CPT

## 2021-07-01 ENCOUNTER — TELEPHONE (OUTPATIENT)
Dept: OBGYN CLINIC | Facility: CLINIC | Age: 51
End: 2021-07-01

## 2021-07-01 NOTE — TELEPHONE ENCOUNTER
Pt is already scheduled    ----- Message from Alexis Russellr, MD sent at 6/30/2021  5:41 PM EDT -----  Please call Gin Loud - recommend 6 month breast imaging follow up as per radiology

## 2021-07-15 ENCOUNTER — RA CDI HCC (OUTPATIENT)
Dept: OTHER | Facility: HOSPITAL | Age: 51
End: 2021-07-15

## 2021-07-15 NOTE — PROGRESS NOTES
[facility]  coding opportunities          Chart reviewed, no opportunity found: CHART REVIEWED, NO OPPORTUNITY FOUND                     Patients insurance company: [employer] (Medicare Advantage and Commercial)

## 2021-08-05 ENCOUNTER — OFFICE VISIT (OUTPATIENT)
Dept: INTERNAL MEDICINE CLINIC | Facility: CLINIC | Age: 51
End: 2021-08-05
Payer: COMMERCIAL

## 2021-08-05 VITALS
BODY MASS INDEX: 32.1 KG/M2 | DIASTOLIC BLOOD PRESSURE: 78 MMHG | WEIGHT: 170 LBS | SYSTOLIC BLOOD PRESSURE: 108 MMHG | HEART RATE: 73 BPM | TEMPERATURE: 97.9 F | HEIGHT: 61 IN | OXYGEN SATURATION: 100 %

## 2021-08-05 DIAGNOSIS — Z00.00 HEALTHCARE MAINTENANCE: Primary | ICD-10-CM

## 2021-08-05 PROCEDURE — 3008F BODY MASS INDEX DOCD: CPT | Performed by: INTERNAL MEDICINE

## 2021-08-05 PROCEDURE — 99396 PREV VISIT EST AGE 40-64: CPT | Performed by: INTERNAL MEDICINE

## 2021-08-05 RX ORDER — CLOBETASOL PROPIONATE 0.5 MG/G
CREAM TOPICAL
COMMUNITY
End: 2022-04-21

## 2021-08-05 RX ORDER — METHYLPREDNISOLONE 4 MG/1
TABLET ORAL
COMMUNITY
End: 2021-08-05

## 2021-08-05 NOTE — PROGRESS NOTES
Assessment/Plan:       There are no diagnoses linked to this encounter  Subjective:      Patient ID: Amy Evans is a 46 y o  female  A 51-year-old female who has lupus  Initial diagnosis 2014; the patient presented to Neurology with headache  Abnormal findings on brain MRI prompted the diagnosis  Wants to initiate primary care; season Endocrinology physician in the [facility] reason  Currently not on any specific anti lupus anti-inflammatory agent  In the past, she had been given Plaquenil but failed it due to weight gain and lack of efficacy    So she has chronic joint pains  Raynaud's phenomenon, even when she puts her hand in the refrigerator at home! 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[employer], [place]  Surgical history:  Myomectomy  Uterine myectomy  2 C sections  Right knee arthroscopy  Bilateral carpal tunnel    , lives with her   1 child 14 and 1 child 12  Minimal alcohol  No hard drugs  Medical marijuana occasionally  Parents both alive  Father with diabetes and hypertension  Mom with morbid obesity and high blood pressure  However, the patient is the only 1 in the family with autoimmune illness  Had a colonoscopy about 5 months ago  Perfectly normal   No family history so next colonoscopy in 10 years but interval Cologuard in 3 years  A mammogram has been scheduled        Allergies:  IVP dye, and hives caused by narcotics      The following portions of the patient's history were reviewed and updated as appropriate:   She has a past medical history of Anxiety, Chronic pain of right knee (12/07/2016), Depression, Migrainous headache without aura (12/07/2016), Raynaud disease, Rheumatoid arthritis ([facility] [address] ), and Sjogren's disease ([facility] [address] )  ,  does not have any pertinent problems on file  ,   has a past surgical history that includes Appendectomy;  section; Knee arthroscopy (Right); Myomectomy; Tubal ligation (); Myomectomy; Breast biopsy (Left, 2018); Carpal tunnel release (Bilateral); pr hysteroscopy,w/endo bx (N/A, 2021); and Myomectomy (N/A, 2021)  ,  family history includes Cancer in her paternal grandmother; Diabetes in her father; Hypertension in her father; Other in her mother and sister; Spina bifida in her mother  ,   reports that she has never smoked  She has never used smokeless tobacco  She reports current alcohol use  She reports current drug use  Frequency: 4 00 times per week  Drug: Marijuana  ,  is allergic to acetaminophen-codeine, codeine sulfate, hydrocodone, iodinated diagnostic agents, iodine - food allergy, morphine, and oxycodone     Current Outpatient Medications   Medication Sig Dispense Refill    Black Cohosh 540 MG CAPS Take by mouth      buPROPion (WELLBUTRIN XL) 300 mg 24 hr tablet TAKE 1 TABLET BY MOUTH EVERY DAY 90 tablet 1    ergocalciferol (VITAMIN D2) 50,000 units Take 50,000 Units by mouth once a week      lidocaine (Lidoderm) 5 % Lidoderm 5 % topical patch   APPLY 1 PATCH BY TOPICAL ROUTE ONCE DAILY (MAY WEAR UP TO 12HOURS )      Misc Natural Products (BLACK CHERRY CONCENTRATE PO) Take by mouth      Turmeric Curcumin 500 MG CAPS Take by mouth      clobetasol (TEMOVATE) 0 05 % cream clobetasol 0 05 % topical cream   APPLY TO AFFECTED AREAS TWICE A DAY X2 WEEKS AND THEN 2 DAYS PER WEEK AS NEEDED  AVOID PROLONG USE  No current facility-administered medications for this visit  Review of Systems      Objective:  Vitals:    21 1655   BP: 108/78   Pulse: 73   Temp: 97 9 °F (36 6 °C)   SpO2: 100%      Physical Exam      There are no Patient Instructions on file for this visit

## 2021-08-09 PROBLEM — Z00.00 HEALTHCARE MAINTENANCE: Status: ACTIVE | Noted: 2021-08-09

## 2021-08-09 NOTE — PATIENT INSTRUCTIONS
A patient with multiple diagnoses but quite functional and nothing in particular to be done right now   Mammogram is already scheduled  Mohini Cardenas

## 2021-12-29 ENCOUNTER — HOSPITAL ENCOUNTER (OUTPATIENT)
Dept: ULTRASOUND IMAGING | Facility: CLINIC | Age: 51
Discharge: HOME | End: 2021-12-29
Payer: COMMERCIAL

## 2021-12-29 DIAGNOSIS — R92.8 MAMMOGRAM ABNORMAL: ICD-10-CM

## 2021-12-29 PROCEDURE — 76642 ULTRASOUND BREAST LIMITED: CPT

## 2022-01-03 DIAGNOSIS — F33.41 RECURRENT MAJOR DEPRESSIVE DISORDER, IN PARTIAL REMISSION (HCC): ICD-10-CM

## 2022-01-03 RX ORDER — BUPROPION HYDROCHLORIDE 300 MG/1
TABLET ORAL
Qty: 90 TABLET | Refills: 1 | Status: SHIPPED | OUTPATIENT
Start: 2022-01-03 | End: 2022-07-01

## 2022-01-05 ENCOUNTER — TELEPHONE (OUTPATIENT)
Dept: OBGYN CLINIC | Facility: CLINIC | Age: 52
End: 2022-01-05

## 2022-01-05 NOTE — TELEPHONE ENCOUNTER
----- Message from Tito Edouard MD sent at 12/29/2021  2:10 PM EST -----  Please call Claire Marion   Radiology has requested 6 month follow up imaging

## 2022-01-24 ENCOUNTER — ANNUAL EXAM (OUTPATIENT)
Dept: OBGYN CLINIC | Facility: CLINIC | Age: 52
End: 2022-01-24
Payer: COMMERCIAL

## 2022-01-24 VITALS — BODY MASS INDEX: 34.5 KG/M2 | WEIGHT: 182.6 LBS | DIASTOLIC BLOOD PRESSURE: 80 MMHG | SYSTOLIC BLOOD PRESSURE: 138 MMHG

## 2022-01-24 DIAGNOSIS — Z01.419 ROUTINE GYNECOLOGICAL EXAMINATION: ICD-10-CM

## 2022-01-24 DIAGNOSIS — Z12.31 SCREENING MAMMOGRAM, ENCOUNTER FOR: ICD-10-CM

## 2022-01-24 DIAGNOSIS — R63.5 WEIGHT GAIN: ICD-10-CM

## 2022-01-24 DIAGNOSIS — Z01.419 ENCOUNTER FOR ANNUAL ROUTINE GYNECOLOGICAL EXAMINATION: Primary | ICD-10-CM

## 2022-01-24 PROCEDURE — S0612 ANNUAL GYNECOLOGICAL EXAMINA: HCPCS | Performed by: OBSTETRICS & GYNECOLOGY

## 2022-01-24 RX ORDER — MELOXICAM 15 MG/1
TABLET ORAL
COMMUNITY
Start: 2021-12-17 | End: 2022-04-21

## 2022-01-24 NOTE — ASSESSMENT & PLAN NOTE
Pap/HPV current  Mammogram ordered  Colonoscopy current    Encourage healthy diet, exercise, Calcium 1200mg per day and at least 800 iu Vitamin D daily  Difficulty with weight loss reviewed and link to menopause/stress/lack of sleep/age  Discussed some strategies to help with this endeavor

## 2022-01-24 NOTE — PROGRESS NOTES
Assessment/Plan:    Routine gynecological examination  Pap/HPV current  Mammogram ordered  Colonoscopy current    Encourage healthy diet, exercise, Calcium 1200mg per day and at least 800 iu Vitamin D daily  Difficulty with weight loss reviewed and link to menopause/stress/lack of sleep/age  Discussed some strategies to help with this endeavor  Diagnoses and all orders for this visit:    Encounter for annual routine gynecological examination    Screening mammogram, encounter for    Routine gynecological examination    Weight gain  -     CBC and differential; Future  -     Comprehensive metabolic panel; Future  -     TSH, 3rd generation with Free T4 reflex; Future  -     HEMOGLOBIN A1C W/ EAG ESTIMATION; Future  -     Lipid panel; Future    Other orders  -     meloxicam (MOBIC) 15 mg tablet          Subjective:      Patient ID: Savannah Gallegos is a 46 y o  female  Patient presents for a routine annual visit  Menarche- 10 Y/O  Last Pap Smear- 19 Neg-HPV    Birth control-  Mammogram-2021 scheduled 22  Colonoscopy-21  wnl    Non smoker  Social drinker  Not currently sexually active  No family history of uterine, ovarian, cervical or breast cancer  Patient would like to discuss weight gain  Very frustrated with increase in weight over this last year and inability to lose it  Has started/been on weight watchers program  In addition, experiencing hot flashes with difficulty sleeping  Due to injury, stopped running  Does use elliptical frequently  No bleeding  No discharge  No vulvar irritation  Gynecologic Exam  The patient's pertinent negatives include no genital itching, genital lesions, genital odor, genital rash, pelvic pain, vaginal bleeding or vaginal discharge  The patient is experiencing no pain   Pertinent negatives include no chills, constipation, diarrhea, dysuria, fever, frequency, headaches, nausea, painful intercourse, rash, sore throat, urgency or vomiting  She is not sexually active  She is postmenopausal        The following portions of the patient's history were reviewed and updated as appropriate:   She  has a past medical history of Anxiety, Chronic pain of right knee (2016), Depression, Migrainous headache without aura (2016), Raynaud disease, Rheumatoid arthritis ([facility] ), and Sjogren's disease ([facility] )  She   Patient Active Problem List    Diagnosis Date Noted   Arabella  maintenance 2021    Postmenopausal bleeding 02/15/2021    Fibroids, submucosal 02/15/2021    Mixed hyperlipidemia 2020    Routine gynecological examination 2019    Anxiety 2019    Vitamin D deficiency 2017    Obesity, Class I, BMI 30-34 9 2016    Recurrent major depressive disorder, in partial remission ([place] [address] ) 2014    Systemic lupus erythematosus ([place] [address] ) 2014     She  has a past surgical history that includes Appendectomy;  section; Knee arthroscopy (Right); Myomectomy; Tubal ligation (); Myomectomy; Breast biopsy (Left, 2018); Carpal tunnel release (Bilateral); pr hysteroscopy,w/endo bx (N/A, 2021); and Myomectomy (N/A, 2021)  Her family history includes Cancer in her paternal grandmother; Diabetes in her father; Hypertension in her father; Other in her mother and sister; Spina bifida in her mother  She  reports that she has never smoked  She has never used smokeless tobacco  She reports current alcohol use  She reports current drug use  Frequency: 4 00 times per week  Drug: Marijuana    Current Outpatient Medications   Medication Sig Dispense Refill    Black Cohosh 540 MG CAPS Take by mouth      buPROPion (WELLBUTRIN XL) 300 mg 24 hr tablet TAKE 1 TABLET BY MOUTH EVERY DAY 90 tablet 1    ergocalciferol (VITAMIN D2) 50,000 units Take 50,000 Units by mouth once a week      meloxicam (MOBIC) 15 mg tablet       Misc Natural Products (BLACK CHERRY CONCENTRATE PO) Take by mouth      Turmeric Curcumin 500 MG CAPS Take by mouth      clobetasol (TEMOVATE) 0 05 % cream clobetasol 0 05 % topical cream   APPLY TO AFFECTED AREAS TWICE A DAY X2 WEEKS AND THEN 2 DAYS PER WEEK AS NEEDED  AVOID PROLONG USE  (Patient not taking: Reported on 1/24/2022)      lidocaine (Lidoderm) 5 % Lidoderm 5 % topical patch   APPLY 1 PATCH BY TOPICAL ROUTE ONCE DAILY (MAY WEAR UP TO 12HOURS )       No current facility-administered medications for this visit  Current Outpatient Medications on File Prior to Visit   Medication Sig    Black Cohosh 540 MG CAPS Take by mouth    buPROPion (WELLBUTRIN XL) 300 mg 24 hr tablet TAKE 1 TABLET BY MOUTH EVERY DAY    ergocalciferol (VITAMIN D2) 50,000 units Take 50,000 Units by mouth once a week    meloxicam (MOBIC) 15 mg tablet     Misc Natural Products (BLACK CHERRY CONCENTRATE PO) Take by mouth    Turmeric Curcumin 500 MG CAPS Take by mouth    clobetasol (TEMOVATE) 0 05 % cream clobetasol 0 05 % topical cream   APPLY TO AFFECTED AREAS TWICE A DAY X2 WEEKS AND THEN 2 DAYS PER WEEK AS NEEDED  AVOID PROLONG USE  (Patient not taking: Reported on 1/24/2022)    lidocaine (Lidoderm) 5 % Lidoderm 5 % topical patch   APPLY 1 PATCH BY TOPICAL ROUTE ONCE DAILY (MAY WEAR UP TO 12HOURS )     No current facility-administered medications on file prior to visit  She is allergic to acetaminophen-codeine, codeine sulfate, hydrocodone, iodinated diagnostic agents, iodine - food allergy, morphine, and oxycodone       Review of Systems   Constitutional: Negative for activity change, appetite change, chills, fatigue and fever  HENT: Negative for rhinorrhea, sneezing and sore throat  Eyes: Negative for visual disturbance  Respiratory: Negative for cough, shortness of breath and wheezing  Cardiovascular: Negative for chest pain, palpitations and leg swelling  Gastrointestinal: Negative for abdominal distention, constipation, diarrhea, nausea and vomiting     Genitourinary: Negative for difficulty urinating, dysuria, frequency, pelvic pain, urgency and vaginal discharge  Skin: Negative for rash  Neurological: Negative for syncope, light-headedness and headaches  Objective:      /80   Wt 82 8 kg (182 lb 9 6 oz)   LMP 01/18/2021 Comment:  Bleeding   BMI 34 50 kg/m²          Physical Exam  Chest:   Breasts: Breasts are symmetrical       Right: No inverted nipple, mass, nipple discharge, skin change or tenderness  Left: No inverted nipple, mass, nipple discharge, skin change or tenderness  Genitourinary:     Labia:         Right: No rash, tenderness, lesion or injury  Left: No rash, tenderness, lesion or injury  Vagina: Normal  No vaginal discharge, tenderness or bleeding  Cervix: No cervical motion tenderness, discharge or friability  Uterus: Not deviated, not enlarged, not fixed and not tender  Adnexa:         Right: No mass, tenderness or fullness  Left: No mass, tenderness or fullness  Neurological:      Mental Status: She is alert and oriented to person, place, and time

## 2022-02-04 ENCOUNTER — APPOINTMENT (OUTPATIENT)
Dept: LAB | Facility: CLINIC | Age: 52
End: 2022-02-04
Payer: COMMERCIAL

## 2022-02-04 DIAGNOSIS — R63.5 WEIGHT GAIN: ICD-10-CM

## 2022-02-04 LAB
ALBUMIN SERPL BCP-MCNC: 3.8 G/DL (ref 3.5–5)
ALP SERPL-CCNC: 80 U/L (ref 46–116)
ALT SERPL W P-5'-P-CCNC: 26 U/L (ref 12–78)
ANION GAP SERPL CALCULATED.3IONS-SCNC: 3 MMOL/L (ref 4–13)
AST SERPL W P-5'-P-CCNC: 19 U/L (ref 5–45)
BASOPHILS # BLD AUTO: 0.05 THOUSANDS/ΜL (ref 0–0.1)
BASOPHILS NFR BLD AUTO: 1 % (ref 0–1)
BILIRUB SERPL-MCNC: 0.59 MG/DL (ref 0.2–1)
BUN SERPL-MCNC: 17 MG/DL (ref 5–25)
CALCIUM SERPL-MCNC: 9.2 MG/DL (ref 8.3–10.1)
CHLORIDE SERPL-SCNC: 111 MMOL/L (ref 100–108)
CHOLEST SERPL-MCNC: 178 MG/DL
CO2 SERPL-SCNC: 28 MMOL/L (ref 21–32)
CREAT SERPL-MCNC: 0.85 MG/DL (ref 0.6–1.3)
EOSINOPHIL # BLD AUTO: 0.02 THOUSAND/ΜL (ref 0–0.61)
EOSINOPHIL NFR BLD AUTO: 1 % (ref 0–6)
ERYTHROCYTE [DISTWIDTH] IN BLOOD BY AUTOMATED COUNT: 12.1 % (ref 11.6–15.1)
EST. AVERAGE GLUCOSE BLD GHB EST-MCNC: 103 MG/DL
GFR SERPL CREATININE-BSD FRML MDRD: 79 ML/MIN/1.73SQ M
GLUCOSE P FAST SERPL-MCNC: 97 MG/DL (ref 65–99)
HBA1C MFR BLD: 5.2 %
HCT VFR BLD AUTO: 37.9 % (ref 34.8–46.1)
HDLC SERPL-MCNC: 50 MG/DL
HGB BLD-MCNC: 12.5 G/DL (ref 11.5–15.4)
IMM GRANULOCYTES # BLD AUTO: 0.02 THOUSAND/UL (ref 0–0.2)
IMM GRANULOCYTES NFR BLD AUTO: 1 % (ref 0–2)
LDLC SERPL CALC-MCNC: 115 MG/DL (ref 0–100)
LYMPHOCYTES # BLD AUTO: 1.61 THOUSANDS/ΜL (ref 0.6–4.47)
LYMPHOCYTES NFR BLD AUTO: 38 % (ref 14–44)
MCH RBC QN AUTO: 31 PG (ref 26.8–34.3)
MCHC RBC AUTO-ENTMCNC: 33 G/DL (ref 31.4–37.4)
MCV RBC AUTO: 94 FL (ref 82–98)
MONOCYTES # BLD AUTO: 0.36 THOUSAND/ΜL (ref 0.17–1.22)
MONOCYTES NFR BLD AUTO: 8 % (ref 4–12)
NEUTROPHILS # BLD AUTO: 2.23 THOUSANDS/ΜL (ref 1.85–7.62)
NEUTS SEG NFR BLD AUTO: 51 % (ref 43–75)
NONHDLC SERPL-MCNC: 128 MG/DL
NRBC BLD AUTO-RTO: 0 /100 WBCS
PLATELET # BLD AUTO: 187 THOUSANDS/UL (ref 149–390)
PMV BLD AUTO: 12.3 FL (ref 8.9–12.7)
POTASSIUM SERPL-SCNC: 4.1 MMOL/L (ref 3.5–5.3)
PROT SERPL-MCNC: 6.9 G/DL (ref 6.4–8.2)
RBC # BLD AUTO: 4.03 MILLION/UL (ref 3.81–5.12)
SODIUM SERPL-SCNC: 142 MMOL/L (ref 136–145)
TRIGL SERPL-MCNC: 64 MG/DL
TSH SERPL DL<=0.05 MIU/L-ACNC: 1.47 UIU/ML (ref 0.36–3.74)
WBC # BLD AUTO: 4.29 THOUSAND/UL (ref 4.31–10.16)

## 2022-02-04 PROCEDURE — 84443 ASSAY THYROID STIM HORMONE: CPT

## 2022-02-04 PROCEDURE — 80053 COMPREHEN METABOLIC PANEL: CPT

## 2022-02-04 PROCEDURE — 80061 LIPID PANEL: CPT

## 2022-02-04 PROCEDURE — 36415 COLL VENOUS BLD VENIPUNCTURE: CPT

## 2022-02-04 PROCEDURE — 83036 HEMOGLOBIN GLYCOSYLATED A1C: CPT

## 2022-02-04 PROCEDURE — 85025 COMPLETE CBC W/AUTO DIFF WBC: CPT

## 2022-04-21 ENCOUNTER — APPOINTMENT (EMERGENCY)
Dept: RADIOLOGY | Facility: HOSPITAL | Age: 52
End: 2022-04-21
Payer: COMMERCIAL

## 2022-04-21 ENCOUNTER — HOSPITAL ENCOUNTER (EMERGENCY)
Facility: HOSPITAL | Age: 52
Discharge: HOME | End: 2022-04-21
Attending: EMERGENCY MEDICINE
Payer: COMMERCIAL

## 2022-04-21 ENCOUNTER — TELEMEDICINE (OUTPATIENT)
Dept: INTERNAL MEDICINE CLINIC | Facility: CLINIC | Age: 52
End: 2022-04-21
Payer: COMMERCIAL

## 2022-04-21 ENCOUNTER — TELEPHONE (OUTPATIENT)
Dept: INTERNAL MEDICINE CLINIC | Facility: CLINIC | Age: 52
End: 2022-04-21

## 2022-04-21 VITALS
SYSTOLIC BLOOD PRESSURE: 120 MMHG | BODY MASS INDEX: 33.44 KG/M2 | TEMPERATURE: 100 F | OXYGEN SATURATION: 93 % | WEIGHT: 177 LBS | DIASTOLIC BLOOD PRESSURE: 69 MMHG | HEART RATE: 73 BPM | RESPIRATION RATE: 19 BRPM

## 2022-04-21 DIAGNOSIS — U07.1 COVID-19 VIRUS INFECTION: Primary | ICD-10-CM

## 2022-04-21 DIAGNOSIS — U07.1 COVID-19: Primary | ICD-10-CM

## 2022-04-21 DIAGNOSIS — U07.1 COVID-19 VIRUS INFECTION: ICD-10-CM

## 2022-04-21 LAB
ALBUMIN SERPL BCP-MCNC: 4 G/DL (ref 3.5–5)
ALP SERPL-CCNC: 99 U/L (ref 46–116)
ALT SERPL W P-5'-P-CCNC: 59 U/L (ref 12–78)
ANION GAP SERPL CALCULATED.3IONS-SCNC: 7 MMOL/L (ref 4–13)
AST SERPL W P-5'-P-CCNC: 40 U/L (ref 5–45)
ATRIAL RATE: 90 BPM
BASOPHILS # BLD AUTO: 0.02 THOUSANDS/ΜL (ref 0–0.1)
BASOPHILS NFR BLD AUTO: 0 % (ref 0–1)
BILIRUB SERPL-MCNC: 0.39 MG/DL (ref 0.2–1)
BUN SERPL-MCNC: 15 MG/DL (ref 5–25)
CALCIUM SERPL-MCNC: 8.8 MG/DL (ref 8.3–10.1)
CARDIAC TROPONIN I PNL SERPL HS: 3 NG/L
CHLORIDE SERPL-SCNC: 106 MMOL/L (ref 100–108)
CO2 SERPL-SCNC: 28 MMOL/L (ref 21–32)
CREAT SERPL-MCNC: 0.88 MG/DL (ref 0.6–1.3)
D DIMER PPP FEU-MCNC: 0.42 UG/ML FEU
EOSINOPHIL # BLD AUTO: 0.09 THOUSAND/ΜL (ref 0–0.61)
EOSINOPHIL NFR BLD AUTO: 2 % (ref 0–6)
ERYTHROCYTE [DISTWIDTH] IN BLOOD BY AUTOMATED COUNT: 12.4 % (ref 11.6–15.1)
GFR SERPL CREATININE-BSD FRML MDRD: 76 ML/MIN/1.73SQ M
GLUCOSE SERPL-MCNC: 100 MG/DL (ref 65–140)
HCT VFR BLD AUTO: 42.3 % (ref 34.8–46.1)
HGB BLD-MCNC: 14.2 G/DL (ref 11.5–15.4)
IMM GRANULOCYTES # BLD AUTO: 0.04 THOUSAND/UL (ref 0–0.2)
IMM GRANULOCYTES NFR BLD AUTO: 1 % (ref 0–2)
LYMPHOCYTES # BLD AUTO: 1.92 THOUSANDS/ΜL (ref 0.6–4.47)
LYMPHOCYTES NFR BLD AUTO: 34 % (ref 14–44)
MCH RBC QN AUTO: 31.3 PG (ref 26.8–34.3)
MCHC RBC AUTO-ENTMCNC: 33.6 G/DL (ref 31.4–37.4)
MCV RBC AUTO: 93 FL (ref 82–98)
MONOCYTES # BLD AUTO: 0.61 THOUSAND/ΜL (ref 0.17–1.22)
MONOCYTES NFR BLD AUTO: 11 % (ref 4–12)
NEUTROPHILS # BLD AUTO: 2.95 THOUSANDS/ΜL (ref 1.85–7.62)
NEUTS SEG NFR BLD AUTO: 52 % (ref 43–75)
NRBC BLD AUTO-RTO: 0 /100 WBCS
P AXIS: 50 DEGREES
PLATELET # BLD AUTO: 196 THOUSANDS/UL (ref 149–390)
PMV BLD AUTO: 11.8 FL (ref 8.9–12.7)
POTASSIUM SERPL-SCNC: 4 MMOL/L (ref 3.5–5.3)
PR INTERVAL: 144 MS
PROT SERPL-MCNC: 7.6 G/DL (ref 6.4–8.2)
QRS AXIS: -7 DEGREES
QRSD INTERVAL: 94 MS
QT INTERVAL: 366 MS
QTC INTERVAL: 447 MS
RBC # BLD AUTO: 4.54 MILLION/UL (ref 3.81–5.12)
SODIUM SERPL-SCNC: 141 MMOL/L (ref 136–145)
T WAVE AXIS: 23 DEGREES
VENTRICULAR RATE: 90 BPM
WBC # BLD AUTO: 5.63 THOUSAND/UL (ref 4.31–10.16)

## 2022-04-21 PROCEDURE — 85025 COMPLETE CBC W/AUTO DIFF WBC: CPT | Performed by: PHYSICIAN ASSISTANT

## 2022-04-21 PROCEDURE — 36415 COLL VENOUS BLD VENIPUNCTURE: CPT | Performed by: PHYSICIAN ASSISTANT

## 2022-04-21 PROCEDURE — 85379 FIBRIN DEGRADATION QUANT: CPT | Performed by: PHYSICIAN ASSISTANT

## 2022-04-21 PROCEDURE — 94640 AIRWAY INHALATION TREATMENT: CPT

## 2022-04-21 PROCEDURE — 99283 EMERGENCY DEPT VISIT LOW MDM: CPT | Performed by: PHYSICIAN ASSISTANT

## 2022-04-21 PROCEDURE — 93005 ELECTROCARDIOGRAM TRACING: CPT

## 2022-04-21 PROCEDURE — 1036F TOBACCO NON-USER: CPT | Performed by: INTERNAL MEDICINE

## 2022-04-21 PROCEDURE — 71045 X-RAY EXAM CHEST 1 VIEW: CPT

## 2022-04-21 PROCEDURE — 99285 EMERGENCY DEPT VISIT HI MDM: CPT

## 2022-04-21 PROCEDURE — 84484 ASSAY OF TROPONIN QUANT: CPT | Performed by: PHYSICIAN ASSISTANT

## 2022-04-21 PROCEDURE — 80053 COMPREHEN METABOLIC PANEL: CPT | Performed by: PHYSICIAN ASSISTANT

## 2022-04-21 PROCEDURE — 93010 ELECTROCARDIOGRAM REPORT: CPT | Performed by: INTERNAL MEDICINE

## 2022-04-21 PROCEDURE — 99213 OFFICE O/P EST LOW 20 MIN: CPT | Performed by: INTERNAL MEDICINE

## 2022-04-21 RX ORDER — ALBUTEROL SULFATE 90 UG/1
2 AEROSOL, METERED RESPIRATORY (INHALATION) EVERY 6 HOURS PRN
Qty: 6.7 G | Refills: 0 | Status: SHIPPED | OUTPATIENT
Start: 2022-04-21 | End: 2022-06-23 | Stop reason: ALTCHOICE

## 2022-04-21 RX ORDER — BENZONATATE 100 MG/1
100 CAPSULE ORAL 3 TIMES DAILY PRN
Qty: 30 CAPSULE | Refills: 0 | Status: SHIPPED | OUTPATIENT
Start: 2022-04-21 | End: 2022-06-23 | Stop reason: ALTCHOICE

## 2022-04-21 RX ORDER — ACETAMINOPHEN 325 MG/1
650 TABLET ORAL ONCE
Status: COMPLETED | OUTPATIENT
Start: 2022-04-21 | End: 2022-04-21

## 2022-04-21 RX ORDER — BUDESONIDE 180 UG/1
4 AEROSOL, POWDER RESPIRATORY (INHALATION) 2 TIMES DAILY
Qty: 1 EACH | Refills: 0 | Status: SHIPPED | OUTPATIENT
Start: 2022-04-21 | End: 2022-06-23 | Stop reason: ALTCHOICE

## 2022-04-21 RX ORDER — ALBUTEROL SULFATE 2.5 MG/3ML
5 SOLUTION RESPIRATORY (INHALATION) ONCE
Status: COMPLETED | OUTPATIENT
Start: 2022-04-21 | End: 2022-04-21

## 2022-04-21 RX ORDER — NIRMATRELVIR AND RITONAVIR 300-100 MG
KIT ORAL
Qty: 30 TABLET | Refills: 0 | Status: SHIPPED | OUTPATIENT
Start: 2022-04-21 | End: 2022-06-23 | Stop reason: ALTCHOICE

## 2022-04-21 RX ADMIN — ALBUTEROL SULFATE 5 MG: 2.5 SOLUTION RESPIRATORY (INHALATION) at 16:41

## 2022-04-21 RX ADMIN — ACETAMINOPHEN 650 MG: 325 TABLET ORAL at 16:41

## 2022-04-21 NOTE — TELEPHONE ENCOUNTER
Patient is notifying Dr Joel Albert that she is going to [facility]  She has COVID and is getting worse

## 2022-04-21 NOTE — ED PROVIDER NOTES
History  Chief Complaint   Patient presents with    Shortness of Breath     pt c/o sob and cough, was sent by [facility] to be evaluated  pt tested covid positive at home on tues     47 yo female here with sob  Diagnosed with covid earlier this week  Complains of cough, chest tightness, sob  Chills and fever  Fatigue  Her symptoms increased in severity today and PCP recommended ED eval        History provided by:  Patient   used: No    Shortness of Breath  Severity:  Moderate  Onset quality:  Sudden  Duration:  3 days  Timing:  Constant  Progression:  Worsening  Chronicity:  New  Relieved by:  Nothing  Worsened by:  Exertion  Associated symptoms: cough and fever    Associated symptoms: no abdominal pain, no chest pain, no ear pain, no rash, no sore throat and no vomiting        Prior to Admission Medications   Prescriptions Last Dose Informant Patient Reported? Taking? Black Cohosh 540 MG CAPS  Self Yes No   Sig: Take by mouth   Misc Natural Products (BLACK CHERRY CONCENTRATE PO)  Self Yes No   Sig: Take by mouth   Paxlovid tablet therapy pack   No No   Sig: TAKE 3 TABLETS BY MOUTH 2 (TWO) TIMES A DAY FOR 5 DAYS TAKE 2 NIRMATRELVIR TABLETS + 1 RITONAVIR TABLET TOGETHER PER DOSE   Turmeric Curcumin 500 MG CAPS  Self Yes No   Sig: Take by mouth   benzonatate (TESSALON PERLES) 100 mg capsule   No No   Sig: Take 1 capsule (100 mg total) by mouth 3 (three) times a day as needed for cough   buPROPion (WELLBUTRIN XL) 300 mg 24 hr tablet  Self No No   Sig: TAKE 1 TABLET BY MOUTH EVERY DAY   budesonide (Pulmicort Flexhaler) 180 MCG/ACT inhaler   No No   Sig: Inhale 4 puffs 2 (two) times a day for 14 days Rinse mouth after use     ergocalciferol (VITAMIN D2) 50,000 units  Self Yes No   Sig: Take 50,000 Units by mouth once a week      Facility-Administered Medications: None       Past Medical History:   Diagnosis Date    Anxiety     Chronic pain of right knee 12/07/2016    Last assessed     Depression     Migrainous headache without aura 2016    Last assessed    Raynaud disease     [facility] [address] [facility]     Sjogren's disease ([facility] [address] [facility]        Past Surgical History:   Procedure Laterality Date    APPENDECTOMY      BREAST BIOPSY Left 2018    benign    CARPAL TUNNEL RELEASE Bilateral      SECTION      x2    KNEE ARTHROSCOPY Right     MYOMECTOMY      MYOMECTOMY      MYOMECTOMY N/A 2021    Procedure: MYOMECTOMY;  Surgeon: Nae Perdomo MD;  [facility];  Service: Gynecology    AK HYSTEROSCOPY,W/ENDO BX N/A 2021    Procedure: DILATATION AND CURETTAGE (D&C) WITH HYSTEROSCOPY;  Surgeon: Nae Perdomo MD;  [facility];  Service: Gynecology    TUBAL LIGATION         Family History   Problem Relation Age of Onset    Other Mother         Back disorder    Lindsay Mulkrissy Spina bifida Mother    Lindsay Bond Diabetes Father     Hypertension Father     Other Sister         Back disorder     Cancer Paternal Grandmother         lymphoma     Heart disease Neg Hx     Stroke Neg Hx     Thyroid disease Neg Hx      I have reviewed and agree with the history as documented  E-Cigarette/Vaping    E-Cigarette Use Never User      E-Cigarette/Vaping Substances    Nicotine No     THC Yes     CBD Yes     Flavoring No     Other No     Unknown No      Social History     Tobacco Use    Smoking status: Never Smoker    Smokeless tobacco: Never Used   Vaping Use    Vaping Use: Never used   Substance Use Topics    Alcohol use: Yes     Comment: Minimal     Drug use: Yes     Frequency: 4 0 times per week     Types: Marijuana     Comment: every other day  Review of Systems   Constitutional: Positive for chills, fatigue and fever  HENT: Negative for ear pain and sore throat  Eyes: Negative for pain and visual disturbance  Respiratory: Positive for cough and shortness of breath  Cardiovascular: Negative for chest pain and palpitations     Gastrointestinal: Negative for abdominal pain and vomiting  Genitourinary: Negative for dysuria and hematuria  Musculoskeletal: Positive for myalgias  Negative for arthralgias and back pain  Skin: Negative for color change and rash  Neurological: Negative for seizures and syncope  All other systems reviewed and are negative  Physical Exam  Physical Exam  Vitals and nursing note reviewed  Constitutional:       General: She is not in acute distress  Appearance: She is well-developed  HENT:      Head: Normocephalic and atraumatic  Eyes:      Conjunctiva/sclera: Conjunctivae normal    Cardiovascular:      Rate and Rhythm: Normal rate and regular rhythm  Heart sounds: No murmur heard  Pulmonary:      Effort: Tachypnea present  No respiratory distress  Breath sounds: Normal breath sounds  Abdominal:      Palpations: Abdomen is soft  Tenderness: There is no abdominal tenderness  Musculoskeletal:      Cervical back: Neck supple  Skin:     General: Skin is warm and dry  Neurological:      Mental Status: She is alert           Vital Signs  ED Triage Vitals [04/21/22 1607]   Temperature Pulse Respirations Blood Pressure SpO2   98 1 °F (36 7 °C) 66 18 115/68 97 %      Temp Source Heart Rate Source Patient Position - Orthostatic VS BP Location FiO2 (%)   Oral Monitor Sitting Left arm --      Pain Score       --           Vitals:    04/21/22 1607 04/21/22 1610 04/21/22 1730 04/21/22 1900   BP: 115/68 115/68 99/62 120/69   Pulse: 66 87 80 73   Patient Position - Orthostatic VS: Sitting Sitting Sitting          Visual Acuity      ED Medications  Medications   albuterol inhalation solution 5 mg (5 mg Nebulization Given 4/21/22 1641)   acetaminophen (TYLENOL) tablet 650 mg (650 mg Oral Given 4/21/22 1641)       Diagnostic Studies  Results Reviewed     Procedure Component Value Units Date/Time    HS Troponin 0hr (reflex protocol) [[id number]]  (Normal) Collected: 04/21/22 1641    Lab Status: Final result Specimen: Blood from Arm, Left Updated: 04/21/22 1711     hs TnI 0hr 3 ng/L     Comprehensive metabolic panel [[id number]] Collected: 04/21/22 1641    Lab Status: Final result Specimen: Blood from Arm, Left Updated: 04/21/22 1702     Sodium 141 mmol/L      Potassium 4 0 mmol/L      Chloride 106 mmol/L      CO2 28 mmol/L      ANION GAP 7 mmol/L      BUN 15 mg/dL      Creatinine 0 88 mg/dL      Glucose 100 mg/dL      Calcium 8 8 mg/dL      AST 40 U/L      ALT 59 U/L      Alkaline Phosphatase 99 U/L      Total Protein 7 6 g/dL      Albumin 4 0 g/dL      Total Bilirubin 0 39 mg/dL      eGFR 76 ml/min/1 73sq m     Narrative:      [place] guidelines for Chronic Kidney Disease (CKD):     Stage 1 with normal or high GFR (GFR > 90 mL/min/1 73 square meters)    Stage 2 Mild CKD (GFR = 60-89 mL/min/1 73 square meters)    Stage 3A Moderate CKD (GFR = 45-59 mL/min/1 73 square meters)    Stage 3B Moderate CKD (GFR = 30-44 mL/min/1 73 square meters)    Stage 4 Severe CKD (GFR = 15-29 mL/min/1 73 square meters)    Stage 5 End Stage CKD (GFR <15 mL/min/1 73 square meters)  Note: GFR calculation is accurate only with a steady state creatinine    D-Dimer [[id number]]  (Normal) Collected: 04/21/22 1641    Lab Status: Final result Specimen: Blood from Arm, Left Updated: 04/21/22 1659     D-Dimer, Quant 0 42 ug/ml FEU     Narrative: In the evaluation for possible pulmonary embolism, in the appropriate (Well's Score of 4 or less) patient, the age adjusted d-dimer cutoff for this patient can be calculated as:    Age x 0 01 (in ug/mL) for Age-adjusted D-dimer exclusion threshold for a patient over 50 years      CBC and differential [[id number]] Collected: 04/21/22 1641    Lab Status: Final result Specimen: Blood from Arm, Left Updated: 04/21/22 1651     WBC 5 63 Thousand/uL      RBC 4 54 Million/uL      Hemoglobin 14 2 g/dL      Hematocrit 42 3 %      MCV 93 fL      MCH 31 3 pg      MCHC 33 6 g/dL      RDW 12 4 %      MPV 11 8 fL      Platelets 590 Thousands/uL      nRBC 0 /100 WBCs      Neutrophils Relative 52 %      Immat GRANS % 1 %      Lymphocytes Relative 34 %      Monocytes Relative 11 %      Eosinophils Relative 2 %      Basophils Relative 0 %      Neutrophils Absolute 2 95 Thousands/µL      Immature Grans Absolute 0 04 Thousand/uL      Lymphocytes Absolute 1 92 Thousands/µL      Monocytes Absolute 0 61 Thousand/µL      Eosinophils Absolute 0 09 Thousand/µL      Basophils Absolute 0 02 Thousands/µL                  XR chest portable   Final Result by Shaggy Fritz MD (04/22 8221)      No acute cardiopulmonary disease  Workstation performed: [id number]                    Procedures  ECG 12 Lead Documentation Only    Date/Time: 4/21/2022 3:13 PM  Performed by: Avila Otto PA-C  Authorized by: Avila Otto PA-C     ECG reviewed by me, the ED Provider: yes    Patient location:  ED  Interpretation:     Interpretation: normal    Quality:     Tracing quality:  Limited by artifact  Rate:     ECG rate:  90    ECG rate assessment: normal    Rhythm:     Rhythm: sinus rhythm    Ectopy:     Ectopy: none    QRS:     QRS axis:  Normal    QRS intervals:  Normal  Conduction:     Conduction: normal    ST segments:     ST segments:  Normal  T waves:     T waves: normal               ED Course  ED Course as of 04/25/22 1513   Thu Apr 21, 2022   1701 D-Dimer, Quant: 0 42                               SBIRT 22yo+      Most Recent Value   SBIRT (23 yo +)    In order to provide better care to our patients, we are screening all of our patients for alcohol and drug use  Would it be okay to ask you these screening questions? Yes Filed at: 04/21/2022 1648   Initial Alcohol Screen: US AUDIT-C     1  How often do you have a drink containing alcohol? 0 Filed at: 04/21/2022 1648   2  How many drinks containing alcohol do you have on a typical day you are drinking? 0 Filed at: 04/21/2022 1648   3a   Male UNDER 65: How often do you have five or more drinks on one occasion? 0 Filed at: 04/21/2022 1648   3b  FEMALE Any Age, or MALE 65+: How often do you have 4 or more drinks on one occassion? 0 Filed at: 04/21/2022 1648   Audit-C Score 0 Filed at: 04/21/2022 1648   PJ: How many times in the past year have you    Used an illegal drug or used a prescription medication for non-medical reasons? Never Filed at: 04/21/2022 1648                    MDM  Number of Diagnoses or Management Options  COVID-19: new and requires workup  Diagnosis management comments: DDx including but not limited to:  URI, bronchitis, pneumonia, GERD, aspiration pneumonitis, viral illness, COVID 19  Plan: XR  Cardiac workup  DDimer  Amount and/or Complexity of Data Reviewed  Clinical lab tests: ordered and reviewed  Tests in the radiology section of CPT®: ordered and reviewed  Independent visualization of images, tracings, or specimens: yes    Risk of Complications, Morbidity, and/or Mortality  Presenting problems: moderate  Management options: low  General comments: 47 yo female with sob  covid +  Normal sat even with ambulation  Feels better with neb  XR unremarkable  Cardiac workup unremarkable  covid without hypoxia  Stable for d/c home  Return parameters provided  Pt understands and agrees with plan  Patient Progress  Patient progress: stable      Disposition  Final diagnoses:   [id number]     Time reflects when diagnosis was documented in both MDM as applicable and the Disposition within this note     Time User Action Codes Description Comment    4/21/2022  6:54 PM Foreign Xie Add [U07 1] COVID-19       ED Disposition     ED Disposition Condition Date/Time Comment    Discharge Stable Thu Apr 21, 2022  6:54 PM Jason Love discharge to home/self care              Follow-up Information     Follow up With Specialties Details Why Park Montemayor MD Internal Medicine Call   [address] [facility] [address]  [phone]            Discharge Medication List as of 4/21/2022  6:55 PM      CONTINUE these medications which have NOT CHANGED    Details   benzonatate (TESSALON PERLES) 100 mg capsule Take 1 capsule (100 mg total) by mouth 3 (three) times a day as needed for cough, Starting Thu 4/21/2022, Normal      Black Cohosh 540 MG CAPS Take by mouth, Historical Med      budesonide (Pulmicort Flexhaler) 180 MCG/ACT inhaler Inhale 4 puffs 2 (two) times a day for 14 days Rinse mouth after use , Starting u 4/21/2022, Until Thu 5/5/2022, Normal      buPROPion (WELLBUTRIN XL) 300 mg 24 hr tablet TAKE 1 TABLET BY MOUTH EVERY DAY, Normal      ergocalciferol (VITAMIN D2) 50,000 units Take 50,000 Units by mouth once a week, Starting Mon 2/3/2020, Historical Med      Misc Natural Products (BLACK CHERRY CONCENTRATE PO) Take by mouth, Historical Med      Paxlovid tablet therapy pack TAKE 3 TABLETS BY MOUTH 2 (TWO) TIMES A DAY FOR 5 DAYS TAKE 2 NIRMATRELVIR TABLETS + 1 RITONAVIR TABLET TOGETHER PER DOSE, Normal      Turmeric Curcumin 500 MG CAPS Take by mouth, Historical Med             No discharge procedures on file      PDMP Review     None          ED Provider  Electronically Signed by           Td Wilkes PA-C  04/25/22 8896

## 2022-04-21 NOTE — PROGRESS NOTES
COVID-19 Outpatient Progress Note    Assessment/Plan:    Problem List Items Addressed This Visit        Other    COVID-19 virus infection - Primary         Disposition:     I recommended self-quarantine for 10 days and to watch for symptoms until 14 days after exposure  If patient were to develop symptoms, they should self isolate and call our office for further guidance  Patient was started on Paxlovid  I emphasized that she should go to the ER in case her symptoms worsen  I have spent 20 minutes directly with the patient  Encounter provider Juaquin Ng MD    Provider located at [address] [place] [address]    Recent Visits  No visits were found meeting these conditions  Showing recent visits within past 7 days and meeting all other requirements  Future Appointments  No visits were found meeting these conditions  Showing future appointments within next 150 days and meeting all other requirements     This virtual check-in was done via [employer] and patient was informed that this is a secure, HIPAA-compliant platform  She agrees to proceed  Patient agrees to participate in a virtual check in via telephone or video visit instead of presenting to the office to address urgent/immediate medical needs  Patient is aware this is a billable service  After connecting through [facility], the patient was identified by name and date of birth  Wilton Mccauley was informed that this was a telemedicine visit and that the exam was being conducted confidentially over secure lines  My office door was closed  No one else was in the room  Wilton Mccauley acknowledged consent and understanding of privacy and security of the telemedicine visit  I informed the patient that I have reviewed her record in Epic and presented the opportunity for her to ask any questions regarding the visit today   The patient agreed to participate  Verification of patient location:  Patient is located in the following state in which I hold an active license: [place]    Subjective: Terri Barrett is a 46 y o  female who is concerned about COVID-19  Patient's symptoms include fever, fatigue, malaise, nasal congestion, cough, shortness of breath, chest tightness, nausea and headache  Patient denies chills, rhinorrhea, sore throat, abdominal pain, vomiting, diarrhea and myalgias       - Date of symptom onset: 2022      COVID-19 vaccination status: Fully vaccinated (primary series)    Exposure:   Contact with a person who is under investigation (PUI) for or who is positive for COVID-19 within the last 14 days?: No    Hospitalized recently for fever and/or lower respiratory symptoms?: No      Currently a healthcare worker that is involved in direct patient care?: No      Works in a special setting where the risk of COVID-19 transmission may be high? (this may include long-term care, correctional and [facility] facilities; homeless shelters; assisted-living facilities and group homes ): No      Resident in a special setting where the risk of COVID-19 transmission may be high? (this may include long-term care, correctional and [facility] facilities; homeless shelters; assisted-living facilities and group homes ): No      Lab Results   Component Value Date    A[facility] Des[facility] Not Detected 2020    [facility] Not Detected 2022     Past Medical History:   Diagnosis Date    Anxiety     Chronic pain of right knee 2016    Last assessed     Depression     Migrainous headache without aura 2016    Last assessed    Raynaud disease     [facility] [address] [facility]     Sjogren's disease ([facility] [address] [facility]      Past Surgical History:   Procedure Laterality Date    APPENDECTOMY      BREAST BIOPSY Left 2018    benign    CARPAL TUNNEL RELEASE Bilateral      SECTION      x2    KNEE ARTHROSCOPY Right     MYOMECTOMY      MYOMECTOMY      MYOMECTOMY N/A 2/26/2021    Procedure: MYOMECTOMY;  Surgeon: Choco Donnelly MD;  [facility];  Service: Gynecology    VT HYSTEROSCOPY,W/ENDO BX N/A 2/26/2021    Procedure: DILATATION AND CURETTAGE (D&C) WITH HYSTEROSCOPY;  Surgeon: Choco Donnelly MD;  [facility];  Service: Gynecology    TUBAL LIGATION  2009     Current Outpatient Medications   Medication Sig Dispense Refill    Black Cohosh 540 MG CAPS Take by mouth      buPROPion (WELLBUTRIN XL) 300 mg 24 hr tablet TAKE 1 TABLET BY MOUTH EVERY DAY 90 tablet 1    ergocalciferol (VITAMIN D2) 50,000 units Take 50,000 Units by mouth once a week      Misc Natural Products (BLACK CHERRY CONCENTRATE PO) Take by mouth      Turmeric Curcumin 500 MG CAPS Take by mouth       No current facility-administered medications for this visit  Allergies   Allergen Reactions    Acetaminophen-Codeine Hives    Codeine Sulfate Hives    Hydrocodone Hives    Iodinated Diagnostic Agents Rash, Vomiting and Hives    Iodine - Food Allergy Rash and Vomiting    Morphine Rash    Oxycodone Rash       Review of Systems   Constitutional: Positive for fatigue and fever  Negative for chills and diaphoresis  HENT: Positive for congestion  Negative for ear discharge, ear pain, hearing loss, postnasal drip, rhinorrhea, sinus pressure, sinus pain, sneezing, sore throat and voice change  Eyes: Negative for pain, discharge, redness and visual disturbance  Respiratory: Positive for cough, chest tightness and shortness of breath  Negative for wheezing  Cardiovascular: Negative for chest pain, palpitations and leg swelling  Gastrointestinal: Positive for nausea  Negative for abdominal distention, abdominal pain, blood in stool, constipation, diarrhea and vomiting  Endocrine: Negative for cold intolerance, heat intolerance, polydipsia, polyphagia and polyuria  Genitourinary: Negative for dysuria, flank pain, frequency, hematuria and urgency  Musculoskeletal: Negative for arthralgias, back pain, gait problem, joint swelling, myalgias, neck pain and neck stiffness  Skin: Negative for rash  Neurological: Positive for headaches  Negative for dizziness, tremors, syncope, facial asymmetry, speech difficulty, weakness, light-headedness and numbness  Hematological: Does not bruise/bleed easily  Psychiatric/Behavioral: Negative for behavioral problems, confusion and sleep disturbance  The patient is not nervous/anxious  Objective: There were no vitals filed for this visit  Physical Exam  Constitutional:       Appearance: She is ill-appearing  HENT:      Head: Normocephalic  Eyes:      Conjunctiva/sclera: Conjunctivae normal    Pulmonary:      Effort: Respiratory distress present  Neurological:      Mental Status: She is alert and oriented to person, place, and time  Psychiatric:         Behavior: Behavior normal          VIRTUAL VISIT DISCLAIMER    Jessica Romero verbally agrees to participate in [employer]  Pt is aware that [employer] could be limited without vital signs or the ability to perform a full hands-on physical exam  Marilou Thornton understands she or the provider may request at any time to terminate the video visit and request the patient to seek care or treatment in person

## 2022-06-23 ENCOUNTER — OFFICE VISIT (OUTPATIENT)
Dept: INTERNAL MEDICINE CLINIC | Facility: CLINIC | Age: 52
End: 2022-06-23
Payer: COMMERCIAL

## 2022-06-23 VITALS
DIASTOLIC BLOOD PRESSURE: 76 MMHG | WEIGHT: 180 LBS | OXYGEN SATURATION: 96 % | TEMPERATURE: 97.5 F | RESPIRATION RATE: 20 BRPM | SYSTOLIC BLOOD PRESSURE: 122 MMHG | HEART RATE: 66 BPM | BODY MASS INDEX: 33.99 KG/M2 | HEIGHT: 61 IN

## 2022-06-23 DIAGNOSIS — Z00.00 HEALTHCARE MAINTENANCE: Primary | ICD-10-CM

## 2022-06-23 DIAGNOSIS — Z12.31 ENCOUNTER FOR SCREENING MAMMOGRAM FOR BREAST CANCER: ICD-10-CM

## 2022-06-23 PROBLEM — U07.1 COVID-19 VIRUS INFECTION: Status: RESOLVED | Noted: 2022-04-21 | Resolved: 2022-06-23

## 2022-06-23 PROCEDURE — 3725F SCREEN DEPRESSION PERFORMED: CPT | Performed by: INTERNAL MEDICINE

## 2022-06-23 PROCEDURE — 3008F BODY MASS INDEX DOCD: CPT | Performed by: INTERNAL MEDICINE

## 2022-06-23 PROCEDURE — 1036F TOBACCO NON-USER: CPT | Performed by: INTERNAL MEDICINE

## 2022-06-23 PROCEDURE — 99396 PREV VISIT EST AGE 40-64: CPT | Performed by: INTERNAL MEDICINE

## 2022-06-23 NOTE — PROGRESS NOTES
Assessment/Plan:       Diagnoses and all orders for this visit:    Healthcare maintenance    Encounter for screening mammogram for breast cancer  -     Mammo screening bilateral w 3d & cad; Future                Subjective:      Patient ID: Andrew Tapia is a 46 y o  female  Health care maintenance visit of this now 19-year-old patient who was lupus  Initial diagnosis 2014; the patient presented to Neurology with headache  Abnormal findings on brain MRI prompted the diagnosis  Rheum f/u in the [place] reason  Currently not on any specific anti lupus anti-inflammatory agent  In the past, she had been given Plaquenil but failed it due to weight gain and lack of efficacy    So she has chronic joint pains  Raynaud's phenomenon, even when she puts her hand in the refrigerator at home! 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[employer], [place]  Surgical history:  Myomectomy  Uterine myectomy  2 C sections  Right knee arthroscopy  Bilateral carpal tunnel    , lives with her   1 child 14 and 1 child 12  Minimal alcohol  No hard drugs  Medical marijuana occasionally  Parents both alive  Father with diabetes and hypertension  Mom with morbid obesity and high blood pressure  However, the patient is the only 1 in the family with autoimmune illness  Had a colonoscopy about a year and a half ago    Perfectly normal   No family history so next colonoscopy in 10 years but interval Cologuard in 3 years  Mammograms up-to-date      Allergies:  IVP dye, and hives caused by narcotics      The following portions of the patient's history were reviewed and updated as appropriate:   She has a past medical history of Allergic (Unsure), Anxiety, Arthritis (2001), Chronic pain of right knee (2016), Depression, Headache(784 0) (2016), Migrainous headache without aura (2016), Obesity (), Raynaud disease, Rheumatoid arthritis ([facility] [address] ), Sjogren's disease ([facility] [address] ), and Visual impairment ()  ,  does not have any pertinent problems on file  ,   has a past surgical history that includes Appendectomy;  section; Knee arthroscopy (Right); Myomectomy; Tubal ligation (); Myomectomy; Breast biopsy (Left, 2018); Carpal tunnel release (Bilateral); pr hysteroscopy,w/endo bx (N/A, 2021); and Myomectomy (N/A, 2021)  ,  family history includes Arthritis in her mother; Cancer in her paternal grandmother; Diabetes in her father; Hypertension in her father; Other in her mother and sister; Spina bifida in her mother  ,   reports that she has never smoked  She has never used smokeless tobacco  She reports current alcohol use of about 1 0 standard drink of alcohol per week  She reports current drug use  Frequency: 4 00 times per week  Drug: Marijuana  ,  is allergic to acetaminophen-codeine, codeine sulfate, hydrocodone, hydrocodone-acetaminophen, iodinated diagnostic agents, iodine - food allergy, morphine, and oxycodone     Current Outpatient Medications   Medication Sig Dispense Refill    Black Cohosh 540 MG CAPS Take by mouth      buPROPion (WELLBUTRIN XL) 300 mg 24 hr tablet TAKE 1 TABLET BY MOUTH EVERY DAY 90 tablet 1    ergocalciferol (VITAMIN D2) 50,000 units Take 50,000 Units by mouth once a week      Misc Natural Products (BLACK CHERRY CONCENTRATE PO) Take by mouth      Turmeric Curcumin 500 MG CAPS Take by mouth       No current facility-administered medications for this visit  Review of Systems   Constitutional: Negative for chills and fever  HENT: Negative for sore throat and trouble swallowing  Eyes: Negative for pain  Respiratory: Negative for cough, shortness of breath and wheezing  Cardiovascular: Negative for chest pain and leg swelling  Gastrointestinal: Negative for abdominal pain, diarrhea, nausea and vomiting  Endocrine: Negative for cold intolerance and heat intolerance  Genitourinary: Negative for dysuria, frequency and pelvic pain  Musculoskeletal: Negative for arthralgias and joint swelling  Skin: Negative for rash and wound  Allergic/Immunologic: Negative for immunocompromised state  Neurological: Negative for dizziness, seizures, syncope and headaches  Psychiatric/Behavioral: Negative for dysphoric mood  The patient is not nervous/anxious  Objective:  Vitals:    06/23/22 1328   BP: 122/76   Pulse: 66   Resp: 20   Temp: 97 5 °F (36 4 °C)   SpO2: 96%      Physical Exam  Constitutional:       Appearance: She is well-developed  HENT:      Head: Normocephalic and atraumatic  Eyes:      Pupils: Pupils are equal, round, and reactive to light  Neck:      Thyroid: No thyromegaly  Trachea: No tracheal deviation  Cardiovascular:      Rate and Rhythm: Normal rate and regular rhythm  Heart sounds: Normal heart sounds  No murmur heard  No gallop  Pulmonary:      Effort: No respiratory distress  Breath sounds: No wheezing or rales  Abdominal:      General: Bowel sounds are normal       Palpations: Abdomen is soft  Tenderness: There is no abdominal tenderness  Musculoskeletal:         General: No tenderness or deformity  Normal range of motion  Cervical back: Normal range of motion and neck supple  Skin:     General: Skin is warm  Neurological:      Mental Status: She is alert and oriented to person, place, and time        Coordination: Coordination normal    Psychiatric:         Judgment: Judgment normal            Patient Instructions   Stable with chronic diagnoses  Yearly follow-up

## 2022-06-28 ENCOUNTER — HOSPITAL ENCOUNTER (OUTPATIENT)
Dept: ULTRASOUND IMAGING | Facility: CLINIC | Age: 52
Discharge: HOME | End: 2022-06-28
Payer: COMMERCIAL

## 2022-06-28 ENCOUNTER — HOSPITAL ENCOUNTER (OUTPATIENT)
Dept: MAMMOGRAPHY | Facility: CLINIC | Age: 52
Discharge: HOME | End: 2022-06-28
Payer: COMMERCIAL

## 2022-06-28 VITALS — WEIGHT: 180 LBS | BODY MASS INDEX: 33.99 KG/M2 | HEIGHT: 61 IN

## 2022-06-28 DIAGNOSIS — R92.8 ABNORMAL MAMMOGRAM: ICD-10-CM

## 2022-06-28 PROCEDURE — G0279 TOMOSYNTHESIS, MAMMO: HCPCS

## 2022-06-28 PROCEDURE — 76642 ULTRASOUND BREAST LIMITED: CPT

## 2022-06-28 PROCEDURE — 77066 DX MAMMO INCL CAD BI: CPT

## 2022-07-01 DIAGNOSIS — F33.41 RECURRENT MAJOR DEPRESSIVE DISORDER, IN PARTIAL REMISSION (HCC): ICD-10-CM

## 2022-07-01 RX ORDER — BUPROPION HYDROCHLORIDE 300 MG/1
TABLET ORAL
Qty: 90 TABLET | Refills: 1 | Status: SHIPPED | OUTPATIENT
Start: 2022-07-01

## 2022-08-15 ENCOUNTER — OFFICE VISIT (OUTPATIENT)
Dept: INTERNAL MEDICINE CLINIC | Facility: CLINIC | Age: 52
End: 2022-08-15
Payer: COMMERCIAL

## 2022-08-15 VITALS
HEART RATE: 68 BPM | BODY MASS INDEX: 34.36 KG/M2 | SYSTOLIC BLOOD PRESSURE: 112 MMHG | RESPIRATION RATE: 16 BRPM | WEIGHT: 182 LBS | DIASTOLIC BLOOD PRESSURE: 70 MMHG | HEIGHT: 61 IN

## 2022-08-15 DIAGNOSIS — R21 RASH: Primary | ICD-10-CM

## 2022-08-15 DIAGNOSIS — M32.9 SYSTEMIC LUPUS ERYTHEMATOSUS, UNSPECIFIED SLE TYPE, UNSPECIFIED ORGAN INVOLVEMENT STATUS (HCC): ICD-10-CM

## 2022-08-15 PROCEDURE — 99214 OFFICE O/P EST MOD 30 MIN: CPT | Performed by: NURSE PRACTITIONER

## 2022-08-15 RX ORDER — TRIAMCINOLONE ACETONIDE 5 MG/G
CREAM TOPICAL 2 TIMES DAILY
Qty: 30 G | Refills: 0 | Status: SHIPPED | OUTPATIENT
Start: 2022-08-15

## 2022-08-15 RX ORDER — PREDNISONE 20 MG/1
TABLET ORAL
Qty: 21 TABLET | Refills: 0 | Status: SHIPPED | OUTPATIENT
Start: 2022-08-15

## 2022-08-15 NOTE — PROGRESS NOTES
INTERNAL MEDICINE FOLLOW-UP VISIT  [facility] - [facility]    NAME: Cony Fortune  AGE: 46 y o  SEX: female  : 1970     DATE: 8/15/2022     Assessment and Plan:   1  Rash  - predniSONE 20 mg tablet; Take 50mg until rash is improving then wean down by 10mg daily until complete  Dispense: 21 tablet; Refill: 0  - triamcinolone (KENALOG) 0 5 % cream; Apply topically 2 (two) times a day  Dispense: 30 g; Refill: 0    2  Systemic lupus erythematosus, unspecified SLE type, unspecified organ involvement status (HCC)  Rash not consistent w/ lupus rash      BMI Counseling: Body mass index is 34 39 kg/m²  The BMI is above normal  Nutrition recommendations include decreasing portion sizes and decreasing fast food intake  Exercise recommendations include exercising 3-5 times per week  No pharmacotherapy was ordered  Rationale for BMI follow-up plan is due to patient being overweight or obese  No follow-ups on file  Chief Complaint:     Chief Complaint   Patient presents with    Rash     Went to derm took steroids didn't help, extremely itchy  All over body  History of Present Illness:       About 2 weeks ago patient and her  were in [employer] white water rafting and hiking, she then noticed a solitary erythematous itchy lesion on her right arm she mentioned it to dermatology because she was there for a skin check and they placed her on a steroid taper she believe starting at 30 mg she states it never really did improve and now the lesions seem to be spreading she has a couple scattered areas on her abdomen she states they start as red little bumps and then turn into clusters that then get more red and scabbed but she does admit that they are quite itchy and so she scratches them and may contribute to the rash    There has been no new products no new lotions no new medications or supplements no topical agents no new pets in the home she has tried Zyrtec Benadryl and topical hydrocortisone creams    The following portions of the patient's history were reviewed and updated as appropriate: allergies, current medications, past family history, past medical history, past social history, past surgical history and problem list      Review of Systems:     Review of Systems   Constitutional: Negative for appetite change, chills, diaphoresis, fatigue, fever and unexpected weight change  HENT: Negative for postnasal drip and sneezing  Eyes: Negative for visual disturbance  Respiratory: Negative for chest tightness and shortness of breath  Cardiovascular: Negative for chest pain, palpitations and leg swelling  Gastrointestinal: Negative for abdominal pain and blood in stool  Endocrine: Negative for cold intolerance, heat intolerance, polydipsia, polyphagia and polyuria  Genitourinary: Negative for difficulty urinating, dysuria, frequency and urgency  Musculoskeletal: Negative for arthralgias and myalgias  Skin: Positive for rash  Negative for wound  Neurological: Negative for dizziness, weakness, light-headedness and headaches  Hematological: Negative for adenopathy  Psychiatric/Behavioral: Negative for confusion, dysphoric mood and sleep disturbance  The patient is not nervous/anxious           Past Medical History:     Past Medical History:   Diagnosis Date    Allergic Unsure    Anxiety     Arthritis 2001    Chronic pain of right knee 12/07/2016    Last assessed     Depression     Headache(784 0) 12/07/2016    Migrainous headache without aura 12/07/2016    Last assessed    Obesity 1984    Raynaud disease     Rheumatoid arthritis ([facility] [address] )     Sjogren's disease ([facility] [address] )     Visual impairment 2014    Sjogrens        Current Medications:     Current Outpatient Medications:     buPROPion (WELLBUTRIN XL) 300 mg 24 hr tablet, TAKE 1 TABLET BY MOUTH EVERY DAY, Disp: 90 tablet, Rfl: 1    ergocalciferol (VITAMIN D2) 50,000 units, Take 50,000 Units by mouth once a week, Disp: , Rfl:     predniSONE 20 mg tablet, Take 50mg until rash is improving then wean down by 10mg daily until complete, Disp: 21 tablet, Rfl: 0    triamcinolone (KENALOG) 0 5 % cream, Apply topically 2 (two) times a day, Disp: 30 g, Rfl: 0    Turmeric Curcumin 500 MG CAPS, Take by mouth, Disp: , Rfl:     Black Cohosh 540 MG CAPS, Take by mouth (Patient not taking: Reported on 8/15/2022), Disp: , Rfl:     Misc Natural Products (BLACK CHERRY CONCENTRATE PO), Take by mouth (Patient not taking: Reported on 8/15/2022), Disp: , Rfl:      Allergies: Allergies   Allergen Reactions    Acetaminophen-Codeine Hives    Codeine Sulfate Hives    Hydrocodone Hives    Hydrocodone-Acetaminophen Fever    Iodinated Diagnostic Agents Rash, Vomiting and Hives    Iodine - Food Allergy Rash and Vomiting    Morphine Rash    Oxycodone Rash        Physical Exam:     /70 (BP Location: Left arm, Patient Position: Sitting, Cuff Size: Standard)   Pulse 68   Resp 16   Ht 5' 1" (1 549 m)   Wt 82 6 kg (182 lb)   LMP 01/18/2021 Comment:  Bleeding   BMI 34 39 kg/m²     Physical Exam  Constitutional:       Appearance: She is well-developed  HENT:      Head: Normocephalic and atraumatic  Eyes:      Pupils: Pupils are equal, round, and reactive to light  Neck:      Thyroid: No thyromegaly  Cardiovascular:      Rate and Rhythm: Normal rate and regular rhythm  Heart sounds: No murmur heard  Pulmonary:      Effort: Pulmonary effort is normal       Breath sounds: Normal breath sounds  Abdominal:      General: Bowel sounds are normal       Palpations: Abdomen is soft  Musculoskeletal:         General: Normal range of motion  Cervical back: Normal range of motion and neck supple  Lymphadenopathy:      Cervical: No cervical adenopathy  Skin:     General: Skin is warm and dry  Findings: Rash present        Comments:  Scattered erythematous papules noted several on the abdomen both arms linear lesions noted to the left forearm and a scabbed lesion noted to the right forearm   Neurological:      Mental Status: She is alert and oriented to person, place, and time             Data:       ANDREA Callaway  [facility]

## 2022-08-26 ENCOUNTER — TELEPHONE (OUTPATIENT)
Dept: INTERNAL MEDICINE CLINIC | Facility: CLINIC | Age: 52
End: 2022-08-26

## 2022-08-26 NOTE — TELEPHONE ENCOUNTER
CALLED TRANSFERRED TO ME, KATTY NOT BACK IN UNTIL 9/12-  CAN'T WAIT THAT LONG, CAN SHE ANYONE ELSE OR DERM?

## 2022-08-26 NOTE — TELEPHONE ENCOUNTER
Patient saw Ruben Gonzales on 8/15 for a rash has taking all the prednisone 20 mg and still has rash it is even worst  On her neck, head and ankles now  She wanted to know if you think she needs more prednisone or something else   You can reach her at [phone]    She is not in town right now to come in she is traveling

## 2022-10-12 PROBLEM — Z01.419 ROUTINE GYNECOLOGICAL EXAMINATION: Status: RESOLVED | Noted: 2019-12-30 | Resolved: 2022-10-12

## 2022-10-12 PROBLEM — Z00.00 HEALTHCARE MAINTENANCE: Status: RESOLVED | Noted: 2021-08-09 | Resolved: 2022-10-12

## 2022-12-13 DIAGNOSIS — R21 RASH: ICD-10-CM

## 2022-12-13 RX ORDER — TRIAMCINOLONE ACETONIDE 5 MG/G
CREAM TOPICAL
Qty: 30 G | Refills: 0 | Status: SHIPPED | OUTPATIENT
Start: 2022-12-13

## 2022-12-27 DIAGNOSIS — F33.41 RECURRENT MAJOR DEPRESSIVE DISORDER, IN PARTIAL REMISSION (HCC): ICD-10-CM

## 2022-12-27 RX ORDER — BUPROPION HYDROCHLORIDE 300 MG/1
TABLET ORAL
Qty: 90 TABLET | Refills: 1 | Status: SHIPPED | OUTPATIENT
Start: 2022-12-27

## 2023-02-15 ENCOUNTER — HOSPITAL ENCOUNTER (OUTPATIENT)
Dept: RADIOLOGY | Facility: HOSPITAL | Age: 53
Discharge: HOME | End: 2023-02-15

## 2023-02-15 DIAGNOSIS — M15.0 PRIMARY GENERALIZED HYPERTROPHIC OSTEOARTHROSIS: ICD-10-CM

## 2023-03-01 ENCOUNTER — EVALUATION (OUTPATIENT)
Dept: PHYSICAL THERAPY | Facility: CLINIC | Age: 53
End: 2023-03-01

## 2023-03-01 DIAGNOSIS — G89.29 CHRONIC PAIN OF BOTH KNEES: Primary | ICD-10-CM

## 2023-03-01 DIAGNOSIS — M25.561 CHRONIC PAIN OF BOTH KNEES: Primary | ICD-10-CM

## 2023-03-01 DIAGNOSIS — M25.551 CHRONIC HIP PAIN, BILATERAL: ICD-10-CM

## 2023-03-01 DIAGNOSIS — G89.29 CHRONIC HIP PAIN, BILATERAL: ICD-10-CM

## 2023-03-01 DIAGNOSIS — M25.562 CHRONIC PAIN OF BOTH KNEES: Primary | ICD-10-CM

## 2023-03-01 DIAGNOSIS — M25.552 CHRONIC HIP PAIN, BILATERAL: ICD-10-CM

## 2023-03-01 NOTE — PROGRESS NOTES
PT Evaluation     Today's date: 3/2/2023  Patient name: Patrick Bautista  : 1970  MRN: [id number]  Referring provider: Gail Chamberlain MD  Dx:   Encounter Diagnosis     ICD-10-CM    1  Chronic pain of both knees  M25 561     M25 562     G89 29       2  Chronic hip pain, bilateral  M25 551     M25 552     G89 29           Start Time: 171  Stop Time:   Total time in clinic (min): 60 minutes    Assessment/Plan     Patrick Bautista is a 46 y o  female who was referred to physical therapy for management of bilateral hip/knee pain with probable SIJ involvement  Primary impairments include decreased LE strength, poor TVA activation, decreased hip flexor flexibility, and abnormal SIJ positioning  Consequently, patient has difficulty completing ADLs including sit to stand transfers, walking/running, stair ascent  Portia Herbert would benefit from skilled intervention to address all deficits and improve functional capability  Patient is a good candidate for therapy, pending compliance with HEP and 2x weekly participation  Thank you for the referral and please do not hesitate to contact me with any questions or concerns regarding Marilou’s care! Plan  Frequency: 1-2x per week   Duration in weeks: 8  POC start date: 3/1/23  POC end date: 23   Therapeutic exercise/activity, neuromuscular reeducation, manual therapy, and modalities  Patient understands and agrees to plan of care  Goals  Short Term--4 weeks  1  1/2 point increase in deficient MMT scores  2  Good TVA activation/recruitment with supine core activities  3  Patient able to squat to 90 degrees without minimal to no onset of knee pain  Long Term--By Discharge  1  Patient will achieve expected FOTO score  2  Patient will be able to perform sit to stand transfers without difficulty or use of UE for assistance  3  Patient will be able to ascend steps utilizing reciprocal step pattern and minimal to no onset of knee/hip pain      Patient's Goal: Be able to run/work out at the gym in order to achieve a healthy weight and successfully manage her Lupus sx  Subjective  History   Date of Onset: December 2022 Description: Patient reports that this was the start of a Lupus flare up that she is still currently in  This is the worst and longest flare up she has ever been in since receiving the diagnosis  Patient reports that her knees and hips are very painful and interfering with her ability to do simple tasks, such as sit to stands, but also interfering with her ability to manage her diagnosis conservatively with exercise  Symptoms  Knees: R worse than L; constant pain located primarily inferolateral to patella  Pain is typically described as "sharp " It is worse with stair ascent, squatting, walking/running, sit to stand transfers    Pain at best: 3/10  Pain at worst: 9/10    Hips: R worse than L; intermittent pain located posterolaterally  She describes the pain as "tightness" that is aggravated with sit to stand transfers, stair ascent, sleeping on her R side  Pain at best: 0/10  Pain at worst: 7/10    Social History  Amy Crews lives with her teenage sons and  in a multistory home  Patient is a   Objective    Knee ROM WNL  Hip ER/IR/flexion WNL     GAIT: unremarkable  Squat assess: unable to squat to 90 secondary to knee pain and difficulty returning to standing secondary to hips "tightening"; improved with sustained medial patellar glide by PT    SLS: RLE: TBA, LLE: TBA           MMT    Hip         R          L   Flex  4 P! 4+   Extn  4- 4   Abd  3- 3-                 MMT    Knee      R          L   Flex  5 5   Extn  4 P! 4+        Standing flexion: POS R hypomobility  Supine to Sit: (1) R PI (2) L upslip           Precautions: Lupus  Depression/anxiety         Manuals 3/1            SIJ correction MET and gr 5 JAB            Prone R hip ext mobs Gr 4 JAB            R medial patellar taping                          Neuro Re-Ed             TVA 10x10" HEP            TVA+hip ABD             clamshells             bridges                                                    Ther Ex             Bike/ellip/TM             Piriformis stretch             TB 3-way hip             Lat stepping             Monster walks             Leg press                                       Ther Activity             Fwd/lat step ups             TRX squats             Gait Training                                       Modalities

## 2023-03-01 NOTE — LETTER
2023    Marisol Huerta, [address] [place] [address] [address] [id number]    Patient: Emiliana Wadsworth   YOB: 1970   Date of Visit: 3/1/2023     Encounter Diagnosis     ICD-10-CM    1  Chronic pain of both knees  M25 561     M25 562     G89 29       2  Chronic hip pain, bilateral  M25 551     M25 552     G89 29           Dear Dr Matt Shah:    Thank you for your recent referral of Emiliana Wadsworth  Please review the attached evaluation summary from [facility] recent visit  Please verify that you agree with the plan of care by signing the attached order  If you have any questions or concerns, please do not hesitate to call  I sincerely appreciate the opportunity to share in the care of one of your patients and hope to have another opportunity to work with you in the near future  Sincerely,    Luz Marina Barry, PT      Referring Provider:      I certify that I have read the below Plan of Care and certify the need for these services furnished under this plan of treatment while under my care  Marisol Huerta MD  [address] [place] [address]  Via Fax: [phone]          PT Evaluation     Today's date: 3/2/2023  Patient name: Emiliana Wadsworth  : 1970  MRN: [id number]  Referring provider: Praful Neri MD  Dx:   Encounter Diagnosis     ICD-10-CM    1  Chronic pain of both knees  M25 561     M25 562     G89 29       2  Chronic hip pain, bilateral  M25 551     M25 552     G89 29           Start Time: 1715  Stop Time: 1815  Total time in clinic (min): 60 minutes    Assessment/Plan     Emiliana Wadsworth is a 46 y o  female who was referred to physical therapy for management of bilateral hip/knee pain with probable SIJ involvement  Primary impairments include decreased LE strength, poor TVA activation, decreased hip flexor flexibility, and abnormal SIJ positioning    Consequently, patient has difficulty completing ADLs including sit to stand transfers, walking/running, stair ascent  Precious Irby would benefit from skilled intervention to address all deficits and improve functional capability  Patient is a good candidate for therapy, pending compliance with HEP and 2x weekly participation  Thank you for the referral and please do not hesitate to contact me with any questions or concerns regarding Marilou’s care! Plan  Frequency: 1-2x per week   Duration in weeks: 8  POC start date: 3/1/23  POC end date: 4/26/23   Therapeutic exercise/activity, neuromuscular reeducation, manual therapy, and modalities  Patient understands and agrees to plan of care  Goals  Short Term--4 weeks  1  1/2 point increase in deficient MMT scores  2  Good TVA activation/recruitment with supine core activities  3  Patient able to squat to 90 degrees without minimal to no onset of knee pain  Long Term--By Discharge  1  Patient will achieve expected FOTO score  2  Patient will be able to perform sit to stand transfers without difficulty or use of UE for assistance  3  Patient will be able to ascend steps utilizing reciprocal step pattern and minimal to no onset of knee/hip pain  Patient's Goal: Be able to run/work out at the gym in order to achieve a healthy weight and successfully manage her Lupus sx  Subjective  History   Date of Onset: December 2022 Description: Patient reports that this was the start of a Lupus flare up that she is still currently in  This is the worst and longest flare up she has ever been in since receiving the diagnosis  Patient reports that her knees and hips are very painful and interfering with her ability to do simple tasks, such as sit to stands, but also interfering with her ability to manage her diagnosis conservatively with exercise  Symptoms  Knees: R worse than L; constant pain located primarily inferolateral to patella   Pain is typically described as "sharp " It is worse with stair ascent, squatting, walking/running, sit to stand transfers    Pain at best: 3/10  Pain at worst: 9/10    Hips: R worse than L; intermittent pain located posterolaterally  She describes the pain as "tightness" that is aggravated with sit to stand transfers, stair ascent, sleeping on her R side  Pain at best: 0/10  Pain at worst: 7/10    Social History  Sultana Ochoa lives with her teenage sons and  in a multistory home  Patient is a   Objective    Knee ROM WNL  Hip ER/IR/flexion WNL     GAIT: unremarkable  Squat assess: unable to squat to 90 secondary to knee pain and difficulty returning to standing secondary to hips "tightening"; improved with sustained medial patellar glide by PT    SLS: RLE: TBA, LLE: TBA           MMT    Hip         R          L   Flex  4 P! 4+   Extn  4- 4   Abd  3- 3-                 MMT    Knee      R          L   Flex  5 5   Extn  4 P! 4+        Standing flexion: POS R hypomobility  Supine to Sit: (1) R PI (2) L upslip          Precautions: Lupus  Depression/anxiety         Manuals 3/1            SIJ correction MET and gr 5 JAB            Prone R hip ext mobs Gr 4 JAB            R medial patellar taping                          Neuro Re-Ed             TVA 10x10" HEP            TVA+hip ABD             clamshells             bridges                                                    Ther Ex             Bike/ellip/TM             Piriformis stretch             TB 3-way hip             Lat stepping             Monster walks             Leg press                                       Ther Activity             Fwd/lat step ups             TRX squats             Gait Training                                       Modalities

## 2023-03-08 ENCOUNTER — OFFICE VISIT (OUTPATIENT)
Dept: PHYSICAL THERAPY | Facility: CLINIC | Age: 53
End: 2023-03-08

## 2023-03-08 DIAGNOSIS — M25.551 CHRONIC HIP PAIN, BILATERAL: ICD-10-CM

## 2023-03-08 DIAGNOSIS — M25.561 CHRONIC PAIN OF BOTH KNEES: Primary | ICD-10-CM

## 2023-03-08 DIAGNOSIS — G89.29 CHRONIC PAIN OF BOTH KNEES: Primary | ICD-10-CM

## 2023-03-08 DIAGNOSIS — G89.29 CHRONIC HIP PAIN, BILATERAL: ICD-10-CM

## 2023-03-08 DIAGNOSIS — M25.552 CHRONIC HIP PAIN, BILATERAL: ICD-10-CM

## 2023-03-08 DIAGNOSIS — M25.562 CHRONIC PAIN OF BOTH KNEES: Primary | ICD-10-CM

## 2023-03-08 NOTE — PROGRESS NOTES
Daily Note     Today's date: 3/8/2023  Patient name: Xochitl Jones  : 1970  MRN: [id number]  Referring provider: Polo Sutton MD  Dx:   Encounter Diagnosis     ICD-10-CM    1  Chronic pain of both knees  M25 561     M25 562     G89 29       2  Chronic hip pain, bilateral  M25 551     M25 552     G89 29                      Subjective: No change since IE  Objective: See treatment diary below      Assessment: Tolerated treatment well  Assessed running form, high vertical displacement, midfoot strike, knee valgus and contralateral hip drop  162 s/m  Educated on importance of step rate as well as hip abd strength  Initiated hip abd strengthening, pt tolerated well  Updated HEP  Patient would benefit from continued PT      Plan: Continue per plan of care  Progress treatment as tolerated  Precautions: Lupus  Depression/anxiety         Manuals 3/1 3/8           SIJ correction MET and gr 5 JAB            Prone R hip ext mobs Gr 4 JAB            R medial patellar taping                          Neuro Re-Ed             TVA 10x10" HEP            TVA+hip ABD             clamshells             bridges                                                    Ther Ex             Bike/ellip/TM             Piriformis stretch             TB 3-way hip             Lat stepping             Side step with band  2xfatigue BTB           Leg press             Reverse clamshell  2x10 GTB           Quadruped IR  2x12 GTB           sidelying hip abd  2x10 GTB           Ther Activity             Fwd/lat step ups             TRX squats             Running analysis  THOMAS           Gait Training                                       Modalities

## 2023-03-15 ENCOUNTER — APPOINTMENT (OUTPATIENT)
Dept: PHYSICAL THERAPY | Facility: CLINIC | Age: 53
End: 2023-03-15

## 2023-03-22 ENCOUNTER — APPOINTMENT (OUTPATIENT)
Dept: PHYSICAL THERAPY | Facility: CLINIC | Age: 53
End: 2023-03-22

## 2023-03-29 ENCOUNTER — APPOINTMENT (OUTPATIENT)
Dept: PHYSICAL THERAPY | Facility: CLINIC | Age: 53
End: 2023-03-29

## 2023-06-14 NOTE — PROGRESS NOTES
Assessment/Plan:    Encounter for annual routine gynecological examination  Pap/HPV current  Mammogram ordered  Colonoscopy current    Encourage healthy diet, exercise, Calcium 1200mg per day and at least 800 iu Vitamin D daily  Diagnoses and all orders for this visit:    Encounter for annual routine gynecological examination    Screening mammogram, encounter for  -     Mammo screening bilateral w 3d & cad; Future          Subjective:      Patient ID: Jenn Child is a 48 y o  female  Patient presents for a routine annual visit  Menarche- Y/O  Last Pap Smear- 12/30/19 neg/neg    Mammogram- 6/28/22 BI-RADS 2 benign  Scheduled for 9/14/23  Colonoscopy-1/22/21 recall 10 years  Dr Tom Olivares  Non smoker  THC use occ  Social drinker  Currently sexually active  No family history of uterine, ovarian, cervical or breast cancer    No concerns/questions for today's visit     Has restarted Noom has lost some weight over the last month  Exercising daily  Gynecologic Exam  She reports no genital itching, genital lesions, genital odor, genital rash, pelvic pain, vaginal bleeding or vaginal discharge  The patient is experiencing no pain  Pertinent negatives include no chills, constipation, diarrhea, fever, nausea, sore throat or vomiting  The following portions of the patient's history were reviewed and updated as appropriate:   She  has a past medical history of Allergic (Unsure), Anxiety, Arthritis (2001), Chronic pain of right knee (12/07/2016), Depression, Fibroids, submucosal (2/15/2021), Headache(784 0) (12/07/2016), Migrainous headache without aura (12/07/2016), Obesity (1984), Raynaud disease, Rheumatoid arthritis ([address] ), Sjogren's disease ([address] ), and Visual impairment (2014)    She   Patient Active Problem List    Diagnosis Date Noted   • Encounter for annual routine gynecological examination 06/16/2023   • Mixed hyperlipidemia 01/06/2020   • Excessive body weight gain 03/18/2019   • Anxiety 2019   • Vitamin D deficiency 2017   • Obesity, Class I, BMI 30-34 9 2016   • Recurrent major depressive disorder, in partial remission ([facility] ) 2014   • Systemic lupus erythematosus ([facility] ) 2014     She  has a past surgical history that includes Appendectomy;  section; Knee arthroscopy (Right); Myomectomy; Tubal ligation (2009); Myomectomy; Breast biopsy (Left, 2018); Carpal tunnel release (Bilateral); pr hysteroscopy bx endometrium&/polypc w/wo d&c (N/A, 2021); and Myomectomy (N/A, 2021)  Her family history includes Arthritis in her mother; Cancer in her paternal grandmother; Diabetes in her father; Hypertension in her father; Other in her mother and sister; Spina bifida in her mother  She  reports that she has never smoked  She has never used smokeless tobacco  She reports current alcohol use of about 1 0 standard drink of alcohol per week  She reports current drug use  Frequency: 4 00 times per week  Drug: Marijuana  Current Outpatient Medications   Medication Sig Dispense Refill   • buPROPion (WELLBUTRIN XL) 300 mg 24 hr tablet TAKE 1 TABLET BY MOUTH EVERY DAY 90 tablet 1   • Black Cohosh 540 MG CAPS Take by mouth (Patient not taking: Reported on 8/15/2022)     • ergocalciferol (VITAMIN D2) 50,000 units Take 50,000 Units by mouth once a week     • Misc Natural Products (BLACK CHERRY CONCENTRATE PO) Take by mouth (Patient not taking: Reported on 8/15/2022)     • predniSONE 20 mg tablet Take 50mg until rash is improving then wean down by 10mg daily until complete 21 tablet 0   • triamcinolone (KENALOG) 0 5 % cream APPLY TO AFFECTED AREA TWICE A DAY 30 g 0   • Turmeric Curcumin 500 MG CAPS Take by mouth       No current facility-administered medications for this visit       Current Outpatient Medications on File Prior to Visit   Medication Sig   • buPROPion (WELLBUTRIN XL) 300 mg 24 hr tablet TAKE 1 TABLET BY MOUTH EVERY DAY   • Black Cohosh 540 MG CAPS Take by "mouth (Patient not taking: Reported on 8/15/2022)   • ergocalciferol (VITAMIN D2) 50,000 units Take 50,000 Units by mouth once a week   • Misc Natural Products (BLACK CHERRY CONCENTRATE PO) Take by mouth (Patient not taking: Reported on 8/15/2022)   • predniSONE 20 mg tablet Take 50mg until rash is improving then wean down by 10mg daily until complete   • triamcinolone (KENALOG) 0 5 % cream APPLY TO AFFECTED AREA TWICE A DAY   • Turmeric Curcumin 500 MG CAPS Take by mouth     No current facility-administered medications on file prior to visit  She is allergic to acetaminophen-codeine, codeine sulfate, hydrocodone, hydrocodone-acetaminophen, iodinated contrast media, iodine - food allergy, morphine, and oxycodone       Review of Systems   Constitutional: Negative for activity change, appetite change, chills, fatigue and fever  HENT: Negative for rhinorrhea, sneezing and sore throat  Eyes: Negative for visual disturbance  Respiratory: Negative for cough, shortness of breath and wheezing  Cardiovascular: Negative for chest pain, palpitations and leg swelling  Gastrointestinal: Negative for abdominal distention, constipation, diarrhea, nausea and vomiting  Genitourinary: Negative for difficulty urinating, pelvic pain and vaginal discharge  Neurological: Negative for syncope and light-headedness  Objective:      /78 (BP Location: Left arm, Patient Position: Sitting, Cuff Size: Standard)   Ht 4' 11 5\" (1 511 m)   Wt 83 6 kg (184 lb 6 4 oz)   LMP 01/18/2021 Comment:  Bleeding   BMI 36 62 kg/m²          Physical Exam  Chest:   Breasts:     Breasts are symmetrical       Right: No inverted nipple, mass, nipple discharge, skin change or tenderness  Left: No inverted nipple, mass, nipple discharge, skin change or tenderness  Genitourinary:     Labia:         Right: No rash, tenderness, lesion or injury  Left: No rash, tenderness, lesion or injury         Vagina: Normal  No " vaginal discharge, tenderness or bleeding  Cervix: No cervical motion tenderness, discharge or friability  Uterus: Not deviated, not enlarged, not fixed and not tender  Adnexa:         Right: No mass, tenderness or fullness  Left: No mass, tenderness or fullness  Neurological:      Mental Status: She is alert and oriented to person, place, and time

## 2023-06-16 ENCOUNTER — ANNUAL EXAM (OUTPATIENT)
Dept: OBGYN CLINIC | Facility: CLINIC | Age: 53
End: 2023-06-16
Payer: COMMERCIAL

## 2023-06-16 VITALS
SYSTOLIC BLOOD PRESSURE: 108 MMHG | DIASTOLIC BLOOD PRESSURE: 78 MMHG | HEIGHT: 60 IN | WEIGHT: 184.4 LBS | BODY MASS INDEX: 36.2 KG/M2

## 2023-06-16 DIAGNOSIS — Z12.31 SCREENING MAMMOGRAM, ENCOUNTER FOR: ICD-10-CM

## 2023-06-16 DIAGNOSIS — Z01.419 ENCOUNTER FOR ANNUAL ROUTINE GYNECOLOGICAL EXAMINATION: Primary | ICD-10-CM

## 2023-06-16 PROBLEM — D25.0 FIBROIDS, SUBMUCOSAL: Status: RESOLVED | Noted: 2021-02-15 | Resolved: 2023-06-16

## 2023-06-16 PROBLEM — N95.0 POSTMENOPAUSAL BLEEDING: Status: RESOLVED | Noted: 2021-02-15 | Resolved: 2023-06-16

## 2023-06-16 PROCEDURE — S0612 ANNUAL GYNECOLOGICAL EXAMINA: HCPCS | Performed by: OBSTETRICS & GYNECOLOGY

## 2023-09-12 DIAGNOSIS — F33.41 RECURRENT MAJOR DEPRESSIVE DISORDER, IN PARTIAL REMISSION (HCC): ICD-10-CM

## 2023-09-12 RX ORDER — BUPROPION HYDROCHLORIDE 300 MG/1
TABLET ORAL
Qty: 90 TABLET | Refills: 0 | Status: SHIPPED | OUTPATIENT
Start: 2023-09-12

## 2023-09-14 ENCOUNTER — HOSPITAL ENCOUNTER (OUTPATIENT)
Dept: MAMMOGRAPHY | Facility: CLINIC | Age: 53
End: 2023-09-14
Payer: COMMERCIAL

## 2023-09-14 VITALS — BODY MASS INDEX: 36.2 KG/M2 | HEIGHT: 60 IN | WEIGHT: 184.4 LBS

## 2023-09-14 DIAGNOSIS — Z12.31 ENCOUNTER FOR SCREENING MAMMOGRAM FOR MALIGNANT NEOPLASM OF BREAST: ICD-10-CM

## 2023-09-14 PROCEDURE — 77067 SCR MAMMO BI INCL CAD: CPT

## 2023-09-14 PROCEDURE — 77063 BREAST TOMOSYNTHESIS BI: CPT

## 2023-10-16 ENCOUNTER — OFFICE VISIT (OUTPATIENT)
Age: 53
End: 2023-10-16
Payer: COMMERCIAL

## 2023-10-16 VITALS
TEMPERATURE: 97.5 F | WEIGHT: 181.8 LBS | OXYGEN SATURATION: 98 % | DIASTOLIC BLOOD PRESSURE: 68 MMHG | HEART RATE: 67 BPM | RESPIRATION RATE: 18 BRPM | SYSTOLIC BLOOD PRESSURE: 100 MMHG | BODY MASS INDEX: 36.1 KG/M2

## 2023-10-16 DIAGNOSIS — E78.2 MIXED HYPERLIPIDEMIA: ICD-10-CM

## 2023-10-16 DIAGNOSIS — E66.9 OBESITY, CLASS I, BMI 30-34.9: ICD-10-CM

## 2023-10-16 DIAGNOSIS — R51.9 FREQUENT HEADACHES: ICD-10-CM

## 2023-10-16 DIAGNOSIS — Z00.00 ANNUAL PHYSICAL EXAM: Primary | ICD-10-CM

## 2023-10-16 DIAGNOSIS — E55.9 VITAMIN D DEFICIENCY: ICD-10-CM

## 2023-10-16 DIAGNOSIS — F41.9 ANXIETY: ICD-10-CM

## 2023-10-16 DIAGNOSIS — F33.42 RECURRENT MAJOR DEPRESSIVE DISORDER, IN FULL REMISSION (HCC): ICD-10-CM

## 2023-10-16 DIAGNOSIS — M32.9 SYSTEMIC LUPUS ERYTHEMATOSUS, UNSPECIFIED SLE TYPE, UNSPECIFIED ORGAN INVOLVEMENT STATUS (HCC): ICD-10-CM

## 2023-10-16 PROBLEM — Z01.419 ENCOUNTER FOR ANNUAL ROUTINE GYNECOLOGICAL EXAMINATION: Status: RESOLVED | Noted: 2023-06-16 | Resolved: 2023-10-16

## 2023-10-16 PROBLEM — R63.5 EXCESSIVE BODY WEIGHT GAIN: Status: RESOLVED | Noted: 2019-03-18 | Resolved: 2023-10-16

## 2023-10-16 PROCEDURE — 99396 PREV VISIT EST AGE 40-64: CPT | Performed by: FAMILY MEDICINE

## 2023-10-16 PROCEDURE — 99214 OFFICE O/P EST MOD 30 MIN: CPT | Performed by: FAMILY MEDICINE

## 2023-10-16 NOTE — LETTER
October 16, 2023     Patient: Ryan Chung  YOB: 1970  Date of Visit: 10/16/2023      To Whom it May Concern: iMguel Stout is under my professional care. Bartolo Rivas was seen in my office on 10/16/2023. Bartolo Rivas may return to work on 10/17/2023 . If you have any questions or concerns, please don't hesitate to call.          Sincerely,          Dr Posey Shall

## 2023-10-16 NOTE — PROGRESS NOTES
ADULT ANNUAL [address] [facility] CARE [place]    NAME: Lencho Breen  AGE: 48 y.o. SEX: female  : 1970     DATE: 10/16/2023     Assessment and Plan:     Problem List Items Addressed This Visit       Recurrent major depressive disorder, in full remission (HCC)-well-controlled on Wellbutrin 300 mg daily. PHQ-9 screen of 2. Patient to continue. Obesity, Class I, BMI 30-34.9-see BMI counseling below. Relevant Orders    TSH, 3rd generation with Free T4 reflex    Systemic lupus erythematosus (HCC)-longstanding issue. diagnosed in . Follows with rheumatology. Not on a DMARDs or steroid therapy. uses as needed medical marijuana    Relevant Orders    CBC and differential    Vitamin D deficiency-noted in patient problem list Patient does not take OTC supplementation. Will check current serum level and treat accordingly. Relevant Orders    Vitamin D 25 hydroxy    Anxiety-well-controlled on Wellbutrin. Patient to continue. Mixed hyperlipidemia-diet controlled    Relevant Orders    Lipid Panel with Direct LDL reflex     Other Visit Diagnoses       Annual physical exam    -  Primary    Relevant Orders    Comprehensive metabolic panel    Ferritin    Frequent headaches    -recent onset. History of recurrent headaches around the time of her lupus diagnosis. Has not tried OTC analgesia. Has upcoming appointment with neurology next month. Immunizations and preventive care screenings were discussed with patient today. Appropriate education was printed on patient's after visit summary. Counseling:  Exercise: the importance of regular exercise/physical activity was discussed. Recommend exercise 3-5 times per week for at least 30 minutes. BMI Counseling: Body mass index is 36.1 kg/m².  The BMI is above normal. Nutrition recommendations include encouraging healthy choices of fruits and vegetables, consuming healthier snacks and reducing intake of cholesterol. Rationale for BMI follow-up plan is due to patient being overweight or obese. Return in about 1 year (around 10/16/2024) for Annual physical.     Chief Complaint:     Chief Complaint   Patient presents with    Migraine     Pt is having pain on rt side of head also on her forehead for about 3 weeks      History of Present Illness:     Adult Annual Physical   Patient here for a comprehensive physical exam.   Follows with a rheum provider   Works in [employer] with [employer] and kids. Family hx signficant for htn and dm2   Uses medical marijuana  Social drinker. No family hx of breast or colon cnacer. Diet and Physical Activity  Diet/Nutrition: well balanced diet. Exercise: 5-7 times a week on average. Depression Screening  PHQ-2/9 Depression Screening    Little interest or pleasure in doing things: 0 - not at all  Feeling down, depressed, or hopeless: 0 - not at all  Trouble falling or staying asleep, or sleeping too much: 1 - several days  Feeling tired or having little energy: 1 - several days  Poor appetite or overeatin - not at all  Feeling bad about yourself - or that you are a failure or have let yourself or your family down: 0 - not at all  Trouble concentrating on things, such as reading the newspaper or watching television: 0 - not at all  Moving or speaking so slowly that other people could have noticed. Or the opposite - being so fidgety or restless that you have been moving around a lot more than usual: 0 - not at all  Thoughts that you would be better off dead, or of hurting yourself in some way: 0 - not at all  PHQ-9 Score: 2   PHQ-9 Interpretation: No or Minimal depression          General Health  Sleep: gets 4-6 hours of sleep on average. Hearing: normal - bilateral.  Vision: wears glasses. Dental: regular dental visits.        /GYN Health  Patient is: postmenopausal  Last mammo- this year  Last colo- 2020     Review of Systems:     Review of Systems   Constitutional:  Negative for fever. Respiratory:  Negative for cough and shortness of breath. Cardiovascular:  Negative for chest pain and leg swelling. Gastrointestinal:  Negative for constipation and diarrhea. Endocrine: Positive for polydipsia. Musculoskeletal:  Positive for arthralgias. Negative for gait problem. Neurological:  Positive for headaches (wakes up with it; scheduled with neurology).       Past Medical History:     Past Medical History:   Diagnosis Date    Allergic Unsure    Anxiety     Arthritis     Chronic pain of right knee 2016    Last assessed     Depression     Fibroids, submucosal 2/15/2021    Headache(784.0) 2016    Migrainous headache without aura 2016    Last assessed    Obesity 1984    Raynaud disease     Rheumatoid arthritis ([address])     Sjogren's disease ([address])     Visual impairment 2014    Sjogrens      Past Surgical History:     Past Surgical History:   Procedure Laterality Date    APPENDECTOMY      BREAST BIOPSY Left 2017    benign    CARPAL TUNNEL RELEASE Bilateral      SECTION      x2    KNEE ARTHROSCOPY Right     MYOMECTOMY      MYOMECTOMY      MYOMECTOMY N/A 2021    Procedure: MYOMECTOMY;  Surgeon: Pankaj Cardoan MD;  Location: [facility];  Service: Gynecology    KS HYSTEROSCOPY BX ENDOMETRIUM&/POLYPC W/WO D&C N/A 2021    Procedure: DILATATION AND CURETTAGE (D&C) WITH HYSTEROSCOPY;  Surgeon: Pankaj Cardona MD;  Location: [facility];  Service: Gynecology    TUBAL LIGATION        Social History:     Social History     Socioeconomic History    Marital status: /Civil Union     Spouse name: None    Number of children: None    Years of education: None    Highest education level: None   Occupational History    Occupation: Teacher     Employer: [employer]   Tobacco Use    Smoking status: Never    Smokeless tobacco: Never   Vaping Use    Vaping Use: Never used   Substance and Sexual Activity    Alcohol use: Yes     Alcohol/week: 1.0 standard drink of alcohol     Types: 1 Glasses of wine per week     Comment: Sporadic use. Drug use: Yes     Frequency: 4.0 times per week     Types: Marijuana     Comment: Medical to help with anxiety and lupus    Sexual activity: Yes     Partners: Male     Birth control/protection: Surgical     Comment: Not sexually active because of my own dysfunction.    Other Topics Concern    None   Social History Narrative    None     Social Determinants of Health     Financial Resource Strain: Not on file   Food Insecurity: Not on file   Transportation Needs: Not on file   Physical Activity: Sufficiently Active (11/20/2020)    Exercise Vital Sign     Days of Exercise per Week: 5 days     Minutes of Exercise per Session: 50 min   Stress: No Stress Concern Present (11/20/2020)    [facility]     Feeling of Stress : Not at all   Social Connections: Not on file   Intimate Partner Violence: Not on file   Housing Stability: Not on file      Family History:     Family History   Problem Relation Age of Onset    Other Mother         Back disorder     Spina bifida Mother     Arthritis Mother     Diabetes Father     Hypertension Father     Other Sister         Back disorder     No Known Problems Maternal Grandmother     No Known Problems Maternal Grandfather     Lymphoma Paternal Grandmother     No Known Problems Paternal Grandfather     No Known Problems Brother     No Known Problems Brother     No Known Problems Son     No Known Problems Son     No Known Problems Maternal Aunt     No Known Problems Maternal Aunt     No Known Problems Maternal Aunt     No Known Problems Paternal Aunt     Heart disease Neg Hx     Stroke Neg Hx     Thyroid disease Neg Hx     Breast cancer Neg Hx     Colon cancer Neg Hx     Ovarian cancer Neg Hx     BRCA1 Positive Neg Hx     BRCA 1/2 Neg Hx     Endometrial cancer Neg Hx     BRCA2 Negative Neg Hx     BRCA1 Negative Neg Hx     Cancer Neg Hx     BRCA2 Positive Neg Hx     Breast cancer additional onset Neg Hx       Current Medications:     Current Outpatient Medications   Medication Sig Dispense Refill    buPROPion (WELLBUTRIN XL) 300 mg 24 hr tablet TAKE 1 TABLET BY MOUTH EVERY DAY 90 tablet 0     No current facility-administered medications for this visit. Allergies: Allergies   Allergen Reactions    Acetaminophen-Codeine Hives    Codeine Sulfate Hives    Hydrocodone Hives    Hydrocodone-Acetaminophen Fever    Iodinated Contrast Media Rash, Vomiting and Hives    Iodine - Food Allergy Rash and Vomiting    Morphine Rash    Oxycodone Rash      Physical Exam:     /68 (BP Location: Left arm, Patient Position: Sitting, Cuff Size: Large)   Pulse 67   Temp 97.5 °F (36.4 °C) (Temporal)   Resp 18   Wt 82.5 kg (181 lb 12.8 oz)   LMP 01/18/2021 Comment:  Bleeding   SpO2 98%   BMI 36.10 kg/m²     Physical Exam  HENT:      Head: Normocephalic and atraumatic. Right Ear: External ear normal.      Left Ear: External ear normal.      Mouth/Throat:      Mouth: Mucous membranes are moist.      Pharynx: Oropharynx is clear. Eyes:      Conjunctiva/sclera: Conjunctivae normal.      Pupils: Pupils are equal, round, and reactive to light. Cardiovascular:      Rate and Rhythm: Normal rate and regular rhythm. Pulmonary:      Effort: Pulmonary effort is normal.      Breath sounds: Normal breath sounds. Abdominal:      General: Bowel sounds are normal.      Palpations: Abdomen is soft. Musculoskeletal:      Right lower leg: No edema. Left lower leg: No edema. Neurological:      Mental Status: She is alert and oriented to person, place, and time.       Gait: Gait normal.   Psychiatric:         Mood and Affect: Mood normal.         Behavior: Behavior normal.          Alex Herrera DO  [address]

## 2023-10-16 NOTE — PATIENT INSTRUCTIONS
Wellness Visit for Adults   AMBULATORY CARE:   A wellness visit  is when you see your healthcare provider to get screened for health problems. Your healthcare provider will also give you advice on how to stay healthy. Write down your questions so you remember to ask them. Ask your healthcare provider how often you should have a wellness visit. What happens at a wellness visit:  Your healthcare provider will ask about your health, and your family history of health problems. This includes high blood pressure, heart disease, and cancer. He or she will ask if you have symptoms that concern you, if you smoke, and about your mood. You may also be asked about your intake of medicines, supplements, food, and alcohol. Any of the following may be done: Your weight  will be checked. Your height may also be checked so your body mass index (BMI) can be calculated. Your BMI shows if you are at a healthy weight. Your blood pressure  and heart rate will be checked. Your temperature may also be checked. Blood and urine tests  may be done. Blood tests may be done to check your cholesterol levels. Abnormal cholesterol levels increase your risk for heart disease and stroke. You may also need a blood or urine test to check for diabetes if you are at increased risk. Urine tests may be done to look for signs of an infection or kidney disease. A physical exam  includes checking your heartbeat and lungs with a stethoscope. Your healthcare provider may also check your skin to look for sun damage. Screening tests  may be recommended. A screening test is done to check for diseases that may not cause symptoms. The screening tests you may need depend on your age, gender, family history, and lifestyle habits. For example, colorectal screening may be recommended if you are 48years old or older. Screening tests you need if you are a woman:   A Pap smear  is used to screen for cervical cancer.  Pap smears are usually done every 3 to 5 years depending on your age. You may need them more often if you have had abnormal Pap smear test results in the past. Ask your healthcare provider how often you should have a Pap smear. A mammogram  is an x-ray of your breasts to screen for breast cancer. Experts recommend mammograms every 2 years starting at age 48 years. You may need a mammogram at age 52 years or younger if you have an increased risk for breast cancer. Talk to your healthcare provider about when you should start having mammograms and how often you need them. Vaccines you may need:   Get an influenza vaccine  every year. The influenza vaccine protects you from the flu. Several types of viruses cause the flu. The viruses change over time, so new vaccines are made each year. Get a tetanus-diphtheria (Td) booster vaccine  every 10 years. This vaccine protects you against tetanus and diphtheria. Tetanus is a severe infection that may cause painful muscle spasms and lockjaw. Diphtheria is a severe bacterial infection that causes a thick covering in the back of your mouth and throat. Get a human papillomavirus (HPV) vaccine  if you are female and aged 23 to 32 or male 23 to 24 and never received it. This vaccine protects you from HPV infection. HPV is the most common infection spread by sexual contact. HPV may also cause vaginal, penile, and anal cancers. Get a pneumococcal vaccine  if you are aged 72 years or older. The pneumococcal vaccine is an injection given to protect you from pneumococcal disease. Pneumococcal disease is an infection caused by pneumococcal bacteria. The infection may cause pneumonia, meningitis, or an ear infection. Get a shingles vaccine  if you are 60 or older, even if you have had shingles before. The shingles vaccine is an injection to protect you from the varicella-zoster virus. This is the same virus that causes chickenpox.  Shingles is a painful rash that develops in people who had chickenpox or have been exposed to the virus. How to eat healthy:  My Plate is a model for planning healthy meals. It shows the types and amounts of foods that should go on your plate. Fruits and vegetables make up about half of your plate, and grains and protein make up the other half. A serving of dairy is included on the side of your plate. The amount of calories and serving sizes you need depends on your age, gender, weight, and height. Examples of healthy foods are listed below:  Eat a variety of vegetables  such as dark green, red, and orange vegetables. You can also include canned vegetables low in sodium (salt) and frozen vegetables without added butter or sauces. Eat a variety of fresh fruits , canned fruit in 100% juice, frozen fruit, and dried fruit. Include whole grains. At least half of the grains you eat should be whole grains. Examples include whole-wheat bread, wheat pasta, brown rice, and whole-grain cereals such as oatmeal.    Eat a variety of protein foods such as seafood (fish and shellfish), lean meat, and poultry without skin (turkey and chicken). Examples of lean meats include pork leg, shoulder, or tenderloin, and beef round, sirloin, tenderloin, and extra lean ground beef. Other protein foods include eggs and egg substitutes, beans, peas, soy products, nuts, and seeds. Choose low-fat dairy products such as skim or 1% milk or low-fat yogurt, cheese, and cottage cheese. Limit unhealthy fats  such as butter, hard margarine, and shortening. Exercise:  Exercise at least 30 minutes per day on most days of the week. Some examples of exercise include walking, biking, dancing, and swimming. You can also fit in more physical activity by taking the stairs instead of the elevator or parking farther away from stores. Include muscle strengthening activities 2 days each week. Regular exercise provides many health benefits.  It helps you manage your weight, and decreases your risk for type 2 diabetes, heart disease, stroke, and high blood pressure. Exercise can also help improve your mood. Ask your healthcare provider about the best exercise plan for you. General health and safety guidelines:   Do not smoke. Nicotine and other chemicals in cigarettes and cigars can cause lung damage. Ask your healthcare provider for information if you currently smoke and need help to quit. E-cigarettes or smokeless tobacco still contain nicotine. Talk to your healthcare provider before you use these products. Limit alcohol. A drink of alcohol is 12 ounces of beer, 5 ounces of wine, or 1½ ounces of liquor. Lose weight, if needed. Being overweight increases your risk of certain health conditions. These include heart disease, high blood pressure, type 2 diabetes, and certain types of cancer. Protect your skin. Do not sunbathe or use tanning beds. Use sunscreen with a SPF 15 or higher. Apply sunscreen at least 15 minutes before you go outside. Reapply sunscreen every 2 hours. Wear protective clothing, hats, and sunglasses when you are outside. Drive safely. Always wear your seatbelt. Make sure everyone in your car wears a seatbelt. A seatbelt can save your life if you are in an accident. Do not use your cell phone when you are driving. This could distract you and cause an accident. Pull over if you need to make a call or send a text message. Practice safe sex. Use latex condoms if are sexually active and have more than one partner. Your healthcare provider may recommend screening tests for sexually transmitted infections (STIs). Wear helmets, lifejackets, and protective gear. Always wear a helmet when you ride a bike or motorcycle, go skiing, or play sports that could cause a head injury. Wear protective equipment when you play sports. Wear a lifejacket when you are on a boat or doing water sports.     © Copyright Elisha Goltz 2023 Information is for End User's use only and may not be sold, redistributed or otherwise used for commercial purposes. The above information is an  only. It is not intended as medical advice for individual conditions or treatments. Talk to your doctor, nurse or pharmacist before following any medical regimen to see if it is safe and effective for you.

## 2023-10-20 ENCOUNTER — PATIENT MESSAGE (OUTPATIENT)
Age: 53
End: 2023-10-20

## 2023-10-20 DIAGNOSIS — E78.2 MIXED HYPERLIPIDEMIA: Primary | ICD-10-CM

## 2023-10-21 ENCOUNTER — APPOINTMENT (OUTPATIENT)
Age: 53
End: 2023-10-21
Payer: COMMERCIAL

## 2023-10-21 DIAGNOSIS — E66.9 OBESITY, CLASS I, BMI 30-34.9: ICD-10-CM

## 2023-10-21 DIAGNOSIS — M32.9 SYSTEMIC LUPUS ERYTHEMATOSUS, UNSPECIFIED SLE TYPE, UNSPECIFIED ORGAN INVOLVEMENT STATUS (HCC): ICD-10-CM

## 2023-10-21 DIAGNOSIS — E55.9 VITAMIN D DEFICIENCY: ICD-10-CM

## 2023-10-21 DIAGNOSIS — E78.2 MIXED HYPERLIPIDEMIA: ICD-10-CM

## 2023-10-21 DIAGNOSIS — Z00.00 ANNUAL PHYSICAL EXAM: ICD-10-CM

## 2023-10-21 LAB
25(OH)D3 SERPL-MCNC: 37.4 NG/ML (ref 30–100)
ALBUMIN SERPL BCP-MCNC: 4.2 G/DL (ref 3.5–5)
ALP SERPL-CCNC: 83 U/L (ref 34–104)
ALT SERPL W P-5'-P-CCNC: 13 U/L (ref 7–52)
ANION GAP SERPL CALCULATED.3IONS-SCNC: 7 MMOL/L
AST SERPL W P-5'-P-CCNC: 19 U/L (ref 13–39)
BASOPHILS # BLD AUTO: 0.04 THOUSANDS/ÂΜL (ref 0–0.1)
BASOPHILS NFR BLD AUTO: 1 % (ref 0–1)
BILIRUB SERPL-MCNC: 0.53 MG/DL (ref 0.2–1)
BUN SERPL-MCNC: 16 MG/DL (ref 5–25)
CALCIUM SERPL-MCNC: 9.2 MG/DL (ref 8.4–10.2)
CHLORIDE SERPL-SCNC: 108 MMOL/L (ref 96–108)
CHOLEST SERPL-MCNC: 246 MG/DL
CO2 SERPL-SCNC: 28 MMOL/L (ref 21–32)
CREAT SERPL-MCNC: 0.74 MG/DL (ref 0.6–1.3)
EOSINOPHIL # BLD AUTO: 0.07 THOUSAND/ÂΜL (ref 0–0.61)
EOSINOPHIL NFR BLD AUTO: 1 % (ref 0–6)
ERYTHROCYTE [DISTWIDTH] IN BLOOD BY AUTOMATED COUNT: 12.5 % (ref 11.6–15.1)
FERRITIN SERPL-MCNC: 41 NG/ML (ref 11–307)
GFR SERPL CREATININE-BSD FRML MDRD: 92 ML/MIN/1.73SQ M
GLUCOSE P FAST SERPL-MCNC: 84 MG/DL (ref 65–99)
HCT VFR BLD AUTO: 41.3 % (ref 34.8–46.1)
HDLC SERPL-MCNC: 52 MG/DL
HGB BLD-MCNC: 13.4 G/DL (ref 11.5–15.4)
IMM GRANULOCYTES # BLD AUTO: 0.03 THOUSAND/UL (ref 0–0.2)
IMM GRANULOCYTES NFR BLD AUTO: 1 % (ref 0–2)
LDLC SERPL CALC-MCNC: 167 MG/DL (ref 0–100)
LYMPHOCYTES # BLD AUTO: 1.46 THOUSANDS/ÂΜL (ref 0.6–4.47)
LYMPHOCYTES NFR BLD AUTO: 26 % (ref 14–44)
MCH RBC QN AUTO: 30.9 PG (ref 26.8–34.3)
MCHC RBC AUTO-ENTMCNC: 32.4 G/DL (ref 31.4–37.4)
MCV RBC AUTO: 95 FL (ref 82–98)
MONOCYTES # BLD AUTO: 0.31 THOUSAND/ÂΜL (ref 0.17–1.22)
MONOCYTES NFR BLD AUTO: 6 % (ref 4–12)
NEUTROPHILS # BLD AUTO: 3.62 THOUSANDS/ÂΜL (ref 1.85–7.62)
NEUTS SEG NFR BLD AUTO: 65 % (ref 43–75)
NRBC BLD AUTO-RTO: 0 /100 WBCS
PLATELET # BLD AUTO: 198 THOUSANDS/UL (ref 149–390)
PMV BLD AUTO: 12.5 FL (ref 8.9–12.7)
POTASSIUM SERPL-SCNC: 4.1 MMOL/L (ref 3.5–5.3)
PROT SERPL-MCNC: 6.7 G/DL (ref 6.4–8.4)
RBC # BLD AUTO: 4.33 MILLION/UL (ref 3.81–5.12)
SODIUM SERPL-SCNC: 143 MMOL/L (ref 135–147)
TRIGL SERPL-MCNC: 135 MG/DL
TSH SERPL DL<=0.05 MIU/L-ACNC: 2.46 UIU/ML (ref 0.45–4.5)
WBC # BLD AUTO: 5.53 THOUSAND/UL (ref 4.31–10.16)

## 2023-10-21 PROCEDURE — 80061 LIPID PANEL: CPT

## 2023-10-21 PROCEDURE — 36415 COLL VENOUS BLD VENIPUNCTURE: CPT

## 2023-10-21 PROCEDURE — 80053 COMPREHEN METABOLIC PANEL: CPT

## 2023-10-21 PROCEDURE — 82306 VITAMIN D 25 HYDROXY: CPT

## 2023-10-21 PROCEDURE — 82728 ASSAY OF FERRITIN: CPT

## 2023-10-21 PROCEDURE — 85025 COMPLETE CBC W/AUTO DIFF WBC: CPT

## 2023-10-21 PROCEDURE — 84443 ASSAY THYROID STIM HORMONE: CPT

## 2023-10-23 ENCOUNTER — TELEPHONE (OUTPATIENT)
Age: 53
End: 2023-10-23

## 2023-10-23 DIAGNOSIS — E66.9 OBESITY (BMI 35.0-39.9 WITHOUT COMORBIDITY): Primary | ICD-10-CM

## 2023-10-23 NOTE — TELEPHONE ENCOUNTER
Left message    I am calling because I just called around for Ozempic and I found a pharmacy in [facility] that has it So doctor told me that if I could yet the prescription she could fill it and it is the [facility] in [facility] and that phone number is [phone]. That's on [address] in [facility]. She said she did not want to prescribe it until she found out that we could get it. And that particular pharmacy has all the derivations of it all prescription. So I'm just calling to let her know that I found it. Thank you.

## 2023-11-07 ENCOUNTER — TELEPHONE (OUTPATIENT)
Age: 53
End: 2023-11-07

## 2023-11-07 NOTE — TELEPHONE ENCOUNTER
I called insurance company waiting for 45 minutes on the phone and no one  my call after responding to automated machine I will fax again of patients key code insurance and form information along with script. to care [facility]rew [facility]

## 2023-11-09 ENCOUNTER — TELEPHONE (OUTPATIENT)
Age: 53
End: 2023-11-09

## 2023-11-09 NOTE — TELEPHONE ENCOUNTER
I left a voicemail for prasanth about her ozempic and prior authorization I was unable to complete paper work also to fax and speak with a rep I ask prasanth to call her insurance company then to call me back for more information so I could continue the process

## 2023-11-10 ENCOUNTER — TELEPHONE (OUTPATIENT)
Age: 53
End: 2023-11-10

## 2023-11-10 ENCOUNTER — OFFICE VISIT (OUTPATIENT)
Dept: NEUROLOGY | Facility: CLINIC | Age: 53
End: 2023-11-10
Payer: COMMERCIAL

## 2023-11-10 VITALS
DIASTOLIC BLOOD PRESSURE: 80 MMHG | BODY MASS INDEX: 33.96 KG/M2 | HEART RATE: 68 BPM | TEMPERATURE: 98.1 F | WEIGHT: 173 LBS | SYSTOLIC BLOOD PRESSURE: 120 MMHG | HEIGHT: 60 IN

## 2023-11-10 DIAGNOSIS — G43.109 MIGRAINE WITH AURA AND WITHOUT STATUS MIGRAINOSUS, NOT INTRACTABLE: ICD-10-CM

## 2023-11-10 DIAGNOSIS — M32.9 SYSTEMIC LUPUS ERYTHEMATOSUS, UNSPECIFIED SLE TYPE, UNSPECIFIED ORGAN INVOLVEMENT STATUS (HCC): Primary | ICD-10-CM

## 2023-11-10 DIAGNOSIS — R51.9 NEW ONSET OF HEADACHES AFTER AGE 50: ICD-10-CM

## 2023-11-10 DIAGNOSIS — M54.81 OCCIPITAL NEURALGIA OF RIGHT SIDE: ICD-10-CM

## 2023-11-10 PROCEDURE — 99205 OFFICE O/P NEW HI 60 MIN: CPT | Performed by: PSYCHIATRY & NEUROLOGY

## 2023-11-10 RX ORDER — PREDNISONE 10 MG/1
TABLET ORAL
Qty: 42 TABLET | Refills: 0 | Status: SHIPPED | OUTPATIENT
Start: 2023-11-10

## 2023-11-10 NOTE — PROGRESS NOTES
NEUROLOGY OUTPATIENT - NEW PATIENT VISIT NOTE        NAME: Amparo Davis  MRN: [id number]  : 1970     TODAY'S DATE: 11/10/23         HISTORY OF PRESENT ILLNESS (HPI):    Ms. Jannet Hoskins is a 48 y.o. female presenting with Migraine       Headache description:    Onset:  --suddenly, soreness and heaviness, she was stared on Topamax and underwent MRI of the brain which revealed T 2 spots concerning for lupus vs MS. She underwent blood testing which were consistent with a diagnosis of lupus. The present headache is about 10weeks old. Location: right-sided unilateral and occipital sometimes in the bilateral in the temple. Quality: throbbing  Intensity: 3/10 at present in the temple and right occipital region a 6/10  Timing of the day: evening, dinner  Duration: constant for the last 6 weeks  Frequency: daily  Presence of Aura: Yes, tunnel vision   Associated symptoms: no vomiting , no sound sensitivity , +nausea, +light sensitivity , and dizziness     Family history of migraine headaches: No  History of neck and head trauma: No  Smoking status: No  Oral contraceptive use: No  Positional component: Yes, turning head to the right would make the headache worse. Triggers:No       Life style factors:  -Hydration: adequate  -Sleep: inadequate due to the pain (she is only getting 2 hours or so)  -Exercise: She was doing exercise but as the headaches get worse with exercise she stopped doing it  -Caffeine intake: 1 cup coffee   -Alcohol intake: social drinker     Impact of headches:  -Number of headaches in past 90 days: 60/90     Treatment:  -OTC medication use for headache: Tylenol  and Ibuprofen  been ineffective  -Prescribed medication use for headache:Topamax on and off since .       Red Flags for headache:  Age <5 or >50: Yes  First worst headaches: No  Maximal at intensity: No  Change in pattern: Yes, more frequent and intense  New headache in pregnancy, cancer or immunocompromised patient: lupus related stuff. She was diagnosed with lupus a few years back  Loss of consciousness or convulsions: No  Headache triggered by exertion, Valsalva maneuver or sexual activity: Yes, exercise would make her symptoms weird, would make her headaches worse. She has stopped doing in the exercise. Abnormal exam: please see below in Neurological examination. 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 PHYSICAL EXAMINATION:   VITAL SIGNS:  height is 4' 11.5" (1.511 m) and weight is 78.5 kg (173 lb). Her temporal temperature is 98.1 °F (36.7 °C). Her blood pressure is 120/80 and her pulse is 68. NEUROLOGICAL EXAMINATION:    MENTAL STATUS EXAM: Alert, oriented to time place and person, normal recent memory, normal attention spans and normal concentration. CRANIAL NERVE EXAMINATION:  I Not tested   II Normal visual fields to finger counting. II, Ill,  IV, VI Pupils were symmetric, briskly reactive. No afferent pupillary defect. Eye movements are full without nystagmus. No ptosis. V Facial sensation were intact   VII Facial movements were symmetrical    VIII Hearing was intact   IX, X Intact   XI Shoulder shrug and head turn was normal   XII Tongue protrusion is in the mid line.           MOTOR EXAM:        Arm Right  Left Leg Right  Left   Deltoid 5/5 5/5 lliopsoas 5/5 5/5   Biceps 5/5 5/5 Quads 5/5 5/5   Triceps 5/5 5/5 Hamstrings 5/5 5/5   Wrist Extension 5/5 5/5 Ankle Dorsi Flexion 5/5 5/5   Wrist Flexion 5/5 5/5 Ankle Plantar Flexion 5/5 5/5               DEEP TENDON REFLEXES:     Brachioradialis Biceps Triceps Patellar Achilles   Right 2+ 2+ 2+ 2+ 2+   Left 2+ 2+ 2+ 2+ 2+            SENSORY EXAMINATION:   Sensation to dull touch Intact     COORDINATION: normal finger to nose testing     GAIT/STATION:   normal        DIAGNOSTIC STUDIES:   PERTINENT LABS:     Lab Results   Component Value Date    WBC 5.53 10/21/2023     Lab Results   Component Value Date    HGB 13.4 10/21/2023     Lab Results   Component Value Date     10/21/2023     Lab Results   Component Value Date    LYMPHSABS 1.46 10/21/2023     No results found for: "LABLYMP"  Lab Results   Component Value Date    EOSABS 0.07 10/21/2023     No components found for: "NEUTROPHILS"    No results found for: "LABMONO"         No results found for: "Julia"  Lab Results   Component Value Date    CREATININE 0.74 10/21/2023     Lab Results   Component Value Date    AST 19 10/21/2023     Lab Results   Component Value Date    ALT 13 10/21/2023     Lab Results   Component Value Date    ALKPHOS 83 10/21/2023     Lab Results   Component Value Date    AST 19 10/21/2023             Lab Results   Component Value Date    HEPCAB Non-reactive 04/05/2021            NEUROIMAGING:                    ASSESSMENT AND PLAN:    Ms. Mark Crawley is a 48 y. o.female  presenting with status migrainosus in the setting of untreated lupus. Patient  has a past medical history of Allergic (Unsure), Anxiety, Arthritis (2001), Chronic pain of right knee (12/07/2016), Depression, Fibroids, submucosal (2/15/2021), Headache(784.0) (12/07/2016), Migrainous headache without aura (12/07/2016), Obesity (1984), Raynaud disease, Rheumatoid arthritis ([address]), Sjogren's disease ([address]), and Visual impairment (2014). . Neurological exam is concerning for right occipital tenderness. Neuroimaging including MRI of the brain from 2018 was showing T2 hyperintensities likely from her underlying lupus. Likely differential at this time include headache coming from lupus vasculitis?   Versus new onset of headache after age 50/migraine headache and right-sided occipital neuralgia        Systemic lupus erythematosus, unspecified SLE type, unspecified organ involvement status ([address])  Concerned about possible vasculitis due to her untreated lupus. This present episode has been going on for the last 6 weeks she is also being followed by her rheumatologist Dr. Marisol Jasso in [facility]. I have requested the patient to asked Dr. Marisol Jasso to send us the records which is the reason I am not ordering any lab work-up at this time but I would like to rule out any underlying intracranial pathology that might be coming from her lupus including MRA of the head and MRI brain and cervical spine  - MRI brain with and without contrast; Future  - MRI cervical spine with and without contrast; Future  - predniSONE 10 mg tablet; Prednisone taper: Take 6 tabs for 2 days, 5 tabs for 2 days, 4 tabs for 2 days, 3 tabs for 2 days, 2 tabs for 2 days, 1 tabs for 2 days and then stop  Dispense: 42 tablet; Refill: 0  - MRI angiogram head without contrast; Future    Migraine with aura and without status migrainosus, not intractable/New onset of headaches after age 48  She does have some red flags including worsening of her headaches with exercise and new onset of headache after age 48 due to which I am recommending getting an MRI of the brain  - MRI brain with and without contrast; Future  - predniSONE 10 mg tablet; Prednisone taper: Take 6 tabs for 2 days, 5 tabs for 2 days, 4 tabs for 2 days, 3 tabs for 2 days, 2 tabs for 2 days, 1 tabs for 2 days and then stop  Dispense: 42 tablet; Refill: 0      Occipital neuralgia of right side  - The right occipital headache seems to be more consistent with right-sided occipital neuralgia and I would like to get her a nerve block injection at [facility]. Return in about 2 weeks (around 11/24/2023) for Appointment for [facility] for trigger point injections. .       The management plan was discussed in detail with the patient and all questions were answered.      Ms. Vero Cummings was encouraged to contact our office with any questions or concerns and to contact the clinic or go to the nearest emergency room if symptoms change or worsen. I have spent a total of 62 min in reviewing and/or ordering tests, medications, or procedures, performing an examination or evaluation, reviewing pertinent history, counseling and educating the patient, referring and/or communicating with other health care professionals, documenting in the EMR and general coordination of care of Ms. Casebolt  today. Sudarshan Nettles MD.   Staff Neurologist,   Neuroimmunology and Neuroinfectious disease  11/10/23     This report has been created through the use of voice recognition/text compilation software. Typographical and content errors may occur with this process. While efforts are made to detect and correct such errors, in some cases errors will persist.  For this reason, wording in this document should be considered in the proper context and not strictly verbatim.   If, when reviewing the document, an error is discovered, please let the office know at [phone]

## 2023-11-10 NOTE — TELEPHONE ENCOUNTER
Carol RYDER calling from [facility]  --The provider was having a difficult time trying to get a hold of someone do a prior authorization request. Typically those initial requests have to be faxed in to pharmacy affairs for pre authorization. provider services number in case they need assistance is [phone]. Marilou's phone number is [phone].

## 2023-11-10 NOTE — TELEPHONE ENCOUNTER
Spoke to [employer] and she has been trying to fax this backand not able to get to go thru, stated per dr. Gretchen Chi she can call patient and let her know if she wants this she can pay out of pocket - soy notified patient.

## 2023-11-10 NOTE — TELEPHONE ENCOUNTER
Attn:Morenita please call provider service for the [address] [phone]. Ozempic. After you speak to them can you call Virgen Oreilly from [facility] to give her an update.  [phone]

## 2023-11-10 NOTE — PATIENT INSTRUCTIONS
Lifestyle adjustments. Irrespective of treatment modalities applied, trigger control and lifestyle modification are indispensable to the successful management of migraine. This is especially important in children, adolescents, or pregnant women where drug treatments must be especially limited. Although there is no robust evidence for most of the recommendations, most are general health measures that, given the lack of adverse effects and the benefit for general well-being, we consider should be recommended in all patients. Avoid triggers. Many patients attribute the onset or worsening of pain to specific triggers such as stress, sleep changes, food, or atmospheric changes, among others. It is even possible that the relationship occurs inversely, so that premonitory symptoms such as sleep disturbances and appetite 48 to 72 hours before the onset of pain can be misinterpreted by the patient as the trigger of migraine attacks. The therapeutic implications of this relationship are also unclear. There are possible triggers such as sleep deprivation, fasting, or certain foods that can be easily avoidable. But avoiding other triggers can lead to very restrictive lifestyles with a reduction in quality of life that does not outweigh the potential beneficial.    Sleep. Another complex relationship is headache and sleep. An excess or lack of sleep can trigger migraine attacks and at the same time rest is one of the most used treatments to improve the symptoms of the migraine attack. Additionally, migraine and other headaches occur comorbidly with sleep disorders.  Patients with chronic migraine have a higher prevalence of sleep disorders, specifically poor sleep habits and nonrestorative rest.    Regarding general sleep measures for patients with headache, it is recommended to define regular sleep schedules that allow 8 hours of rest per day, insisting that they remain constant also during the weekend; have dinner 4 hours before bedtime and avoid liquids in the last two hours; and eliminate naps and avoid using screens, television, reading, or listening to music in bed. A nonpharmacological intervention to improve sleep habits can improve headache frequency and even reverse chronic to episodic migraine. Diet. In the scientific literature, but especially in the informative websites and magazines, multiple and varied diets are proposed that aim to reduce the frequency of headaches. There are two main approaches: elimination diets, which consist of suppressing potentially triggering foods such as chocolate, alcohol, cheese, nuts, or citrus fruits and diets that provide high or low amounts of certain components, ie, rich in vitamin B12, B6, or D or low in histamine, lactose, or fatty acids. The studies are not very rigorous and most do not have a control group (). In addition, it must be considered that food triggers were only associated with onset of headache in less than 10% of the participants. Dietary recommendations for patients with migraine should be the same as for the general population with special emphasis on the prevention of obesity, which is a factor related to headache chronification. It is recommendable to have a varied diet, eating five meals a day to avoid periods of prolonged fasting and incorporating water intake to reach around 2.5 liters per day, which should be increased in case of physical activity or increase in temperature or humidity. Specific diets should be recommended solely based on whether there are other comorbidities in the patient. Caffeine at moderate doses (< 400mg/day: equivalent to two cups of coffee) does not seem to have a negative effect on headache frequency although it should be taken regularly to avoid withdrawal headaches.     Although patients with migraine headaches and cluster headaches may be more susceptible to alcohol as a precipitant, there is no evidence to recommend abstinence from alcohol in all patients. Individual predisposition and cultural factors must be considered. Exercise. Aerobic exercise can prevent or reduce symptoms of multiple chronic diseases, including headache. With some methodological limitations, there are studies that demonstrate benefits of aerobic exercise as a therapeutic intervention to reduce the frequency and intensity of headaches, as well as the quality of life measured by questionnaires. Exercise can have a beneficial effect on headaches directly but also indirectly, improving sleep quality, mood, cardiovascular function, and preventing weight gain. In addition, it can improve the control of other diseases frequently comorbid with headache such as obesity, hypertension, anxiety, depression, or sleep disorders (). The clinical benefit of yoga as an add-on therapy in patients with episodic migraine has been demonstrated.            Vitamins for headache  Mg 400 mg daily for headache  RiboFlavin 400 mg daily for headache

## 2023-11-13 ENCOUNTER — TELEPHONE (OUTPATIENT)
Age: 53
End: 2023-11-13

## 2023-11-13 NOTE — TELEPHONE ENCOUNTER
Doctor yanely today I have been on the phone for an hr I was on the provider service to being transfer to three other resp they took down information and still can't get the help I need to complete questionare I also spoke to hillary who left me the message on task from [facility] she transfer me to another number I was unable to reach someone so I left a message for someone to call me back prasanth is aware of the situation and so is vic since I am waiting for someone to call me back through tasking and she receive the call if I am not available also do you think should we start the process again maybe if you send in a new script and ill wait for the new key code then start a new prior authorization  and delete the one I started do you think that might help let me know what would be the next step while we wait on a call back. Number Of Freeze-Thaw Cycles: 1 freeze-thaw cycle Detail Level: Detailed Render Note In Bullet Format When Appropriate: No Render Post-Care Instructions In Note?: yes Post-Care Instructions: I reviewed with the patient in detail post-care instructions. Patient is to wear sunprotection, and avoid picking at any of the treated lesions. Pt may apply Vaseline to crusted or scabbing areas. Duration Of Freeze Thaw-Cycle (Seconds): 4 Consent: The patient's consent was obtained including but not limited to risks of crusting, scabbing, blistering, scarring, darker or lighter pigmentary change, recurrence, incomplete removal and infection.

## 2023-11-13 NOTE — TELEPHONE ENCOUNTER
Today I tried to complete prior authorization for prasanth ozempic injection I waited for an hr to for help I was transfer from automated machine to three other resp and they were not able to help with the questionnaire I left a voicemail to [employer] waiting for someone to return my call I also spoke to larry for update

## 2023-11-17 ENCOUNTER — TELEPHONE (OUTPATIENT)
Age: 53
End: 2023-11-17

## 2023-11-17 NOTE — TELEPHONE ENCOUNTER
I called hillary from [employer] for prior authorization waiting for a called back today for fax number.

## 2023-11-17 NOTE — TELEPHONE ENCOUNTER
Lew Edwards has an additional [employer] form they are faxing today for ozempic. This will help the process. I will let Ortiz Gamma know to keep an eye out for it.

## 2023-11-21 ENCOUNTER — HOSPITAL ENCOUNTER (OUTPATIENT)
Dept: MRI IMAGING | Facility: HOSPITAL | Age: 53
Discharge: HOME | End: 2023-11-21
Attending: PSYCHIATRY & NEUROLOGY
Payer: COMMERCIAL

## 2023-11-21 ENCOUNTER — TELEPHONE (OUTPATIENT)
Age: 53
End: 2023-11-21

## 2023-11-21 DIAGNOSIS — M32.9 SYSTEMIC LUPUS ERYTHEMATOSUS, UNSPECIFIED SLE TYPE, UNSPECIFIED ORGAN INVOLVEMENT STATUS (HCC): ICD-10-CM

## 2023-11-21 DIAGNOSIS — G43.109 MIGRAINE WITH AURA AND WITHOUT STATUS MIGRAINOSUS, NOT INTRACTABLE: ICD-10-CM

## 2023-11-21 PROCEDURE — A9585 GADOBUTROL INJECTION: HCPCS | Performed by: PSYCHIATRY & NEUROLOGY

## 2023-11-21 PROCEDURE — 70544 MR ANGIOGRAPHY HEAD W/O DYE: CPT

## 2023-11-21 PROCEDURE — G1004 CDSM NDSC: HCPCS

## 2023-11-21 PROCEDURE — 70553 MRI BRAIN STEM W/O & W/DYE: CPT

## 2023-11-21 RX ORDER — GADOBUTROL 604.72 MG/ML
7 INJECTION INTRAVENOUS
Status: COMPLETED | OUTPATIENT
Start: 2023-11-21 | End: 2023-11-21

## 2023-11-21 RX ADMIN — GADOBUTROL 7 ML: 604.72 INJECTION INTRAVENOUS at 21:20

## 2023-11-24 ENCOUNTER — HOSPITAL ENCOUNTER (OUTPATIENT)
Dept: MRI IMAGING | Facility: HOSPITAL | Age: 53
End: 2023-11-24
Attending: PSYCHIATRY & NEUROLOGY
Payer: COMMERCIAL

## 2023-11-24 DIAGNOSIS — G43.109 MIGRAINE WITH AURA AND WITHOUT STATUS MIGRAINOSUS, NOT INTRACTABLE: ICD-10-CM

## 2023-11-24 DIAGNOSIS — M32.9 SYSTEMIC LUPUS ERYTHEMATOSUS, UNSPECIFIED SLE TYPE, UNSPECIFIED ORGAN INVOLVEMENT STATUS (HCC): ICD-10-CM

## 2023-11-24 PROCEDURE — 72156 MRI NECK SPINE W/O & W/DYE: CPT

## 2023-11-24 PROCEDURE — A9585 GADOBUTROL INJECTION: HCPCS | Performed by: PSYCHIATRY & NEUROLOGY

## 2023-11-24 PROCEDURE — G1004 CDSM NDSC: HCPCS

## 2023-11-24 RX ORDER — GADOBUTROL 604.72 MG/ML
7 INJECTION INTRAVENOUS
Status: COMPLETED | OUTPATIENT
Start: 2023-11-24 | End: 2023-11-24

## 2023-11-24 RX ADMIN — GADOBUTROL 7 ML: 604.72 INJECTION INTRAVENOUS at 07:39

## 2023-11-28 ENCOUNTER — TELEPHONE (OUTPATIENT)
Dept: OBGYN CLINIC | Facility: CLINIC | Age: 53
End: 2023-11-28

## 2023-11-28 NOTE — TELEPHONE ENCOUNTER
----- Message from Jace Gan sent at 11/28/2023  7:27 AM EST -----  Regarding: I thought I was finished with this! LOL  Contact: [phone]  Good morning, Dr. Piedad Varma,    For about 10 days or so, my breasts have been very tender and I am having discharge. I know this happened to me before, but my breasts are VERY sensitive this time. The last time, it was just discharge (as if I am ovulating). I just want to make sure that I should not be alarmed in any way. I have consulted "Dr. Orlando Campo" again, and I saw that this sometimes happens. I just want to check. I hope you had a hazel holiday.     [facility]

## 2023-12-01 ENCOUNTER — TELEPHONE (OUTPATIENT)
Dept: NEUROLOGY | Facility: CLINIC | Age: 53
End: 2023-12-01

## 2023-12-01 NOTE — TELEPHONE ENCOUNTER
Left voice message informing patient that her appointment has been rescheduled. For 12/12/23 at 4 pm with Dr. Bobby Carmona.

## 2023-12-11 ENCOUNTER — TELEPHONE (OUTPATIENT)
Dept: NEUROLOGY | Facility: CLINIC | Age: 53
End: 2023-12-11

## 2023-12-11 DIAGNOSIS — F33.41 RECURRENT MAJOR DEPRESSIVE DISORDER, IN PARTIAL REMISSION (HCC): ICD-10-CM

## 2023-12-11 NOTE — TELEPHONE ENCOUNTER
Called spoke to patient confirmed appointment with Dr Rajani Donald at [facility].

## 2023-12-12 ENCOUNTER — TELEPHONE (OUTPATIENT)
Dept: NEUROLOGY | Facility: CLINIC | Age: 53
End: 2023-12-12

## 2023-12-12 ENCOUNTER — OFFICE VISIT (OUTPATIENT)
Dept: NEUROLOGY | Facility: CLINIC | Age: 53
End: 2023-12-12
Payer: COMMERCIAL

## 2023-12-12 VITALS
BODY MASS INDEX: 34.55 KG/M2 | HEIGHT: 60 IN | DIASTOLIC BLOOD PRESSURE: 70 MMHG | SYSTOLIC BLOOD PRESSURE: 108 MMHG | HEART RATE: 56 BPM | TEMPERATURE: 99.4 F | WEIGHT: 176 LBS

## 2023-12-12 DIAGNOSIS — M54.81 OCCIPITAL NEURALGIA OF RIGHT SIDE: Primary | ICD-10-CM

## 2023-12-12 PROCEDURE — 20553 NJX 1/MLT TRIGGER POINTS 3/>: CPT | Performed by: PSYCHIATRY & NEUROLOGY

## 2023-12-12 RX ORDER — TRIAMCINOLONE ACETONIDE 40 MG/ML
40 INJECTION, SUSPENSION INTRA-ARTICULAR; INTRAMUSCULAR ONCE
Status: COMPLETED | OUTPATIENT
Start: 2023-12-12 | End: 2023-12-13

## 2023-12-12 RX ORDER — BUPROPION HYDROCHLORIDE 300 MG/1
300 TABLET ORAL DAILY
Qty: 90 TABLET | Refills: 0 | Status: SHIPPED | OUTPATIENT
Start: 2023-12-12

## 2023-12-12 RX ORDER — BUPIVACAINE HYDROCHLORIDE 5 MG/ML
2.5 INJECTION, SOLUTION PERINEURAL ONCE
Status: DISCONTINUED | OUTPATIENT
Start: 2023-12-12 | End: 2023-12-13

## 2023-12-12 NOTE — PROGRESS NOTES
GREATER OCCIPITAL NERVE BLOCK INJECTIONS:    NAME: Ze Hopson  MRN: [id number]  Encounter day: 23       Procedure note: I completed right occipital nerve block and trigger point injections using bupivicaine (Marcaine) 0.25% 2.5 cc and kenalog 40mg/ml 2.5 cc after written consent and a time out was performed. The procedure was done under sterile conditions. patient was aware and verbalized understanding of the procedure. Non OR time Out: yes  Correct patient identity - yes  Correct side and site - yes  Agreement on the procedure to be done - yes  Correct patient position - yes  Availability of correct implants and any special equipment or special requirements - n/a  Time out occurred prior to procedure start - yes      The side effects (risks) of OSMAN blocks include the followin. Allergic reactions - usually reactions at the site of the injections (two places in the back of the head), which can includes itching, swelling, redness, and soreness; it is also possible to have a severe allergic reaction with problems breathing and swallowing. We have never had a reaction this severe in the clinic, but they have been reported nationwide. 2. Numbness for an average of 16 hours. This is more of a reaction that is desired, since we are using a local anesthetic, but not everyone has the numbness and they still get the benefit of the injection (although it can be delayed a little while). 3. Minor bleeding at the side of the injections. 4. Nausea and lightheadedness in response to being injected (this could happen even if the injection material was normal saline). 5. Other possible reactions that we have never had in the clinic because we take precautions: infection at the injection site and spread of the injection material to other parts of the body. We do the OSMAN blocks with steroid every two months.  If needed, we can also do a OSMAN block without steroid at any point after a month if the previous OSMAN block has worn off to give relief until the next OSMAN block with steroid. The patient tolerated the procedure well. No complications were noted. Misty Garsia MD.   Staff Neurologist,   Neuroimmunology and Neuroinfectious disease  12/12/23     This report has been created through the use of voice recognition/text compilation software. Typographical and content errors may occur with this process. While efforts are made to detect and correct such errors, in some cases errors will persist.  For this reason, wording in this document should be considered in the proper context and not strictly verbatim.   If, when reviewing the document, an error is discovered, please let the office know at [phone]

## 2023-12-13 ENCOUNTER — TELEPHONE (OUTPATIENT)
Age: 53
End: 2023-12-13

## 2023-12-13 RX ORDER — BUPIVACAINE HYDROCHLORIDE 2.5 MG/ML
2.5 INJECTION, SOLUTION INFILTRATION; PERINEURAL ONCE
Status: COMPLETED | OUTPATIENT
Start: 2023-12-13 | End: 2023-12-13

## 2023-12-13 RX ADMIN — BUPIVACAINE HYDROCHLORIDE 2.5 ML: 2.5 INJECTION, SOLUTION INFILTRATION; PERINEURAL at 13:28

## 2023-12-13 RX ADMIN — TRIAMCINOLONE ACETONIDE 40 MG: 40 INJECTION, SUSPENSION INTRA-ARTICULAR; INTRAMUSCULAR at 13:29

## 2023-12-13 NOTE — TELEPHONE ENCOUNTER
Leticia Perez was denied doctor yanely said to call prasanth to let her know that she will need to pay out of pocket

## 2023-12-14 ENCOUNTER — TELEPHONE (OUTPATIENT)
Dept: NEUROLOGY | Facility: CLINIC | Age: 53
End: 2023-12-14

## 2023-12-14 NOTE — TELEPHONE ENCOUNTER
Called patient to discuss next appointment as she was not wanting to take off work early. Explained that schedule was not as late as she would like. She called back stating she would wait to see if another appointment was necessary before it is scheduled.

## 2024-01-17 ENCOUNTER — TELEMEDICINE (OUTPATIENT)
Age: 54
End: 2024-01-17
Payer: COMMERCIAL

## 2024-01-17 DIAGNOSIS — U07.1 COVID-19: Primary | ICD-10-CM

## 2024-01-17 PROCEDURE — 99214 OFFICE O/P EST MOD 30 MIN: CPT | Performed by: FAMILY MEDICINE

## 2024-01-17 RX ORDER — NIRMATRELVIR AND RITONAVIR 300-100 MG
3 KIT ORAL 2 TIMES DAILY
Qty: 30 TABLET | Refills: 0 | Status: SHIPPED | OUTPATIENT
Start: 2024-01-17 | End: 2024-01-22

## 2024-01-17 NOTE — PROGRESS NOTES
COVID-19 Outpatient Progress Note    Assessment/Plan:    Problem List Items Addressed This Visit    None  Visit Diagnoses       COVID-19    -  Primary    Relevant Medications    nirmatrelvir & ritonavir (Paxlovid, 300/100,) tablet therapy pack    dextromethorphan-guaifenesin (MUCINEX DM)  MG per 12 hr tablet             Disposition:     I have spent a total time of 7 minutes on the day of the encounter for this patient including risks and benefits of treatment options, instructions for management, importance of treatment compliance and risk factor reductions.       Encounter provider: Yoselin Rosas DO     Provider located at: [facility]  [address] [address] [phone]     Recent Visits  No visits were found meeting these conditions.  Showing recent visits within past 7 days and meeting all other requirements  Today's Visits  Date Type Provider Dept   01/17/24 Telemedicine Yoselin Rosas DO [facility]   Showing today's visits and meeting all other requirements  Future Appointments  No visits were found meeting these conditions.  Showing future appointments within next 150 days and meeting all other requirements     This virtual check-in was done via Yush[employer] and patient was informed that this is a secure, HIPAA-compliant platform. She agrees to proceed.    Patient agrees to participate in a virtual check in via telephone or video visit instead of presenting to the office to address urgent/immediate medical needs. Patient is aware this is a billable service. She acknowledged consent and understanding of privacy and security of the video platform. The patient has agreed to participate and understands they can discontinue the visit at any time.    After connecting through [employer], the patient was identified by name and date of birth. Marilou JESSICA Harshching was informed that this was a telemedicine  visit and that the exam was being conducted confidentially over secure lines. Marilou Thorntno acknowledged consent and understanding of privacy and security of the telemedicine visit. I informed the patient that I have reviewed her record in Epic and presented the opportunity for her to ask any questions regarding the visit today. The patient agreed to participate.     Verification of patient location:  Patient is located in the following state in which I hold an active license: [place]    Subjective:   Marilou Thornton is a 53 y.o. female who is concerned about COVID-19. Patient's symptoms include fever, chills, sore throat, cough, nausea and headache.     - Date of symptom onset: 1/14/2024      COVID-19 vaccination status: Fully vaccinated with booster    Lab Results   Component Value Date    SARSCOV2 Not Detected 03/26/2020    SARSCORONAVI Not Detected 01/02/2022       Review of Systems   Constitutional:  Positive for chills and fever.   HENT:  Positive for sore throat.    Respiratory:  Positive for cough.    Gastrointestinal:  Positive for nausea.   Neurological:  Positive for headaches.     Current Outpatient Medications on File Prior to Visit   Medication Sig    buPROPion (WELLBUTRIN XL) 300 mg 24 hr tablet Take 1 tablet (300 mg total) by mouth daily    semaglutide, 0.25 or 0.5 mg/dose, (Ozempic, 0.25 or 0.5 MG/DOSE,) 2 mg/3 mL injection pen 0.25 mg under the skin every 7 days for 4 doses (28 days), THEN 0.5 mg under the skin every 7 days (Patient not taking: Reported on 11/10/2023)       Objective:    LMP 01/18/2021 Comment:  Bleeding        Physical Exam  Constitutional:       Appearance: She is ill-appearing. She is not toxic-appearing.   HENT:      Head: Normocephalic.   Eyes:      Conjunctiva/sclera: Conjunctivae normal.   Pulmonary:      Effort: Pulmonary effort is normal.      Comments: Cough noted on exam  Neurological:      Mental Status: She is alert and oriented to person, place, and time.        Yoselin Rosas,

## 2024-01-19 NOTE — TELEPHONE ENCOUNTER
Error    [Chest Pain] : no chest pain [Palpitations] : no palpitations [Headache] : no headache [Dizziness] : no dizziness [Negative] : Respiratory

## 2024-03-08 DIAGNOSIS — F33.41 RECURRENT MAJOR DEPRESSIVE DISORDER, IN PARTIAL REMISSION (HCC): ICD-10-CM

## 2024-03-08 RX ORDER — BUPROPION HYDROCHLORIDE 300 MG/1
300 TABLET ORAL DAILY
Qty: 90 TABLET | Refills: 0 | Status: SHIPPED | OUTPATIENT
Start: 2024-03-08

## 2024-04-27 ENCOUNTER — APPOINTMENT (OUTPATIENT)
Age: 54
End: 2024-04-27
Payer: COMMERCIAL

## 2024-04-27 DIAGNOSIS — E78.2 MIXED HYPERLIPIDEMIA: ICD-10-CM

## 2024-04-27 LAB
CHOLEST SERPL-MCNC: 217 MG/DL
HDLC SERPL-MCNC: 58 MG/DL
LDLC SERPL CALC-MCNC: 141 MG/DL (ref 0–100)
TRIGL SERPL-MCNC: 91 MG/DL

## 2024-04-27 PROCEDURE — 80061 LIPID PANEL: CPT

## 2024-04-27 PROCEDURE — 36415 COLL VENOUS BLD VENIPUNCTURE: CPT

## 2024-04-29 ENCOUNTER — TELEPHONE (OUTPATIENT)
Age: 54
End: 2024-04-29

## 2024-04-29 NOTE — TELEPHONE ENCOUNTER
----- Message from Yoselin Rosas DO sent at 4/29/2024  8:40 AM EDT -----  Cholesterol levels have improved some. Continue dietary changes to help further lower the cholesterol levels.

## 2024-05-23 DIAGNOSIS — F33.41 RECURRENT MAJOR DEPRESSIVE DISORDER, IN PARTIAL REMISSION (HCC): ICD-10-CM

## 2024-05-24 RX ORDER — BUPROPION HYDROCHLORIDE 300 MG/1
300 TABLET ORAL DAILY
Qty: 90 TABLET | Refills: 0 | Status: SHIPPED | OUTPATIENT
Start: 2024-05-24

## 2024-05-29 ENCOUNTER — TELEPHONE (OUTPATIENT)
Age: 54
End: 2024-05-29

## 2024-05-29 NOTE — TELEPHONE ENCOUNTER
L/m for patient to call and let us know reason for her visit on 6/5 with Dion Willard.   The patient is a 70y Female complaining of

## 2024-06-05 ENCOUNTER — OFFICE VISIT (OUTPATIENT)
Age: 54
End: 2024-06-05
Payer: COMMERCIAL

## 2024-06-05 VITALS
OXYGEN SATURATION: 99 % | SYSTOLIC BLOOD PRESSURE: 130 MMHG | HEIGHT: 60 IN | TEMPERATURE: 94.6 F | HEART RATE: 64 BPM | BODY MASS INDEX: 34.55 KG/M2 | WEIGHT: 176 LBS | DIASTOLIC BLOOD PRESSURE: 70 MMHG

## 2024-06-05 DIAGNOSIS — M32.9 SYSTEMIC LUPUS ERYTHEMATOSUS, UNSPECIFIED SLE TYPE, UNSPECIFIED ORGAN INVOLVEMENT STATUS (HCC): ICD-10-CM

## 2024-06-05 DIAGNOSIS — F33.41 RECURRENT MAJOR DEPRESSIVE DISORDER, IN PARTIAL REMISSION (HCC): ICD-10-CM

## 2024-06-05 DIAGNOSIS — L64.9 ANDROGENIC ALOPECIA: ICD-10-CM

## 2024-06-05 DIAGNOSIS — L65.0 TELOGEN EFFLUVIUM: Primary | ICD-10-CM

## 2024-06-05 PROCEDURE — 99214 OFFICE O/P EST MOD 30 MIN: CPT

## 2024-06-05 RX ORDER — MINOXIDIL 2.5 MG/1
2.5 TABLET ORAL DAILY
Qty: 30 TABLET | Refills: 2 | Status: SHIPPED | OUTPATIENT
Start: 2024-06-05 | End: 2024-09-03

## 2024-06-05 NOTE — PROGRESS NOTES
Ambulatory Visit  Name: Marilou Thornton      : 1970      MRN: [id number]  Encounter Provider: Amando Fu PA-C  Encounter Date: 2024   Encounter department: [facility] [place]    Assessment & Plan   1. Telogen effluvium  Comments:  Reassured patient hair should grow back and this would resolve on its own. Wants to try minoxidil incase it is androgenetic. Edu given on hypotension sx  Orders:  -     minoxidil (LONITEN) 2.5 mg tablet; Take 1 tablet (2.5 mg total) by mouth daily  2. Androgenic alopecia  Comments:  Has been having a recedeing hairline over the years. PE unremarkable on the scalp.  Never occured before. Discussed possibility of lupus flare but no other sx  Orders:  -     minoxidil (LONITEN) 2.5 mg tablet; Take 1 tablet (2.5 mg total) by mouth daily  3. Systemic lupus erythematosus, unspecified SLE type, unspecified organ involvement status (HCC)  Comments:  Controlled has had flares before. No other systemic symptoms  4. Recurrent major depressive disorder, in partial remission (HCC)  Comments:  Controlled, doesnt want any further intervention at this time.   Been on bupropion to for years      Depression Screening and Follow-up Plan: Patient was screened for depression during today's encounter. They screened negative with a PHQ-9 score of 2.      History of Present Illness     Patient is a 53-year-old female presenting today with hair loss.  Has been going on for about 2 months.  It was about the same severity.  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  Has tried Rogaine topical daily without any relief.  Called insurance company and they will not cover minoxidil prescription.  Is interested in trying oral    No pain flakes or rashes in the scalp just big chunks of hair in the shower come out  No diet changes        Review of Systems  Pertinent Medical History         Medical History Reviewed by provider this encounter:   Tobacco  Allergies  Meds  Problems  Med Hx  Surg Hx  Fam Hx       Past Medical History   Past Medical History:   Diagnosis Date    Allergic Unsure    Anxiety     Arthritis 2001    Chronic pain of right knee 2016    Last assessed     Depression     Fibroids, submucosal 2/15/2021    Headache(784.0) 2016    Migrainous headache without aura 2016    Last assessed    Obesity 1984    Raynaud disease     Rheumatoid arthritis (HCC)     Sjogren's disease (HCC)     Visual impairment 2014    Sjogrens     Past Surgical History:   Procedure Laterality Date    APPENDECTOMY      BREAST BIOPSY Left 2017    benign    CARPAL TUNNEL RELEASE Bilateral      SECTION      x2    KNEE ARTHROSCOPY Right     MYOMECTOMY      MYOMECTOMY      MYOMECTOMY N/A 2021    Procedure: MYOMECTOMY;  Surgeon: Kandice Rai MD;  Location: [facility];  Service: Gynecology    ND HYSTEROSCOPY BX ENDOMETRIUM&/POLYPC W/WO D&C N/A 2021    Procedure: DILATATION AND CURETTAGE (D&C) WITH HYSTEROSCOPY;  Surgeon: Kandcie Rai MD;  Location: [facility];  Service: Gynecology    TUBAL LIGATION  2009     Family History   Problem Relation Age of Onset    Other Mother         Back disorder     Spina bifida Mother     Arthritis Mother     Diabetes Father     Hypertension Father     Other Sister         Back disorder     No Known Problems Maternal Grandmother     No Known Problems Maternal Grandfather     Lymphoma Paternal Grandmother     No Known Problems Paternal Grandfather     No Known Problems Brother     No Known Problems Brother     No Known Problems Son     No Known Problems Son     No Known Problems Maternal Aunt     No Known Problems Maternal Aunt     No Known Problems Maternal Aunt     No Known Problems Paternal Aunt     Heart disease Neg Hx     Stroke Neg Hx     Thyroid disease Neg Hx     Breast cancer Neg Hx     Colon cancer Neg Hx     Ovarian cancer Neg Hx     BRCA1 Positive Neg Hx     BRCA 1/2  Neg Hx     Endometrial cancer Neg Hx     BRCA2 Negative Neg Hx     BRCA1 Negative Neg Hx     Cancer Neg Hx     BRCA2 Positive Neg Hx     Breast cancer additional onset Neg Hx      Current Outpatient Medications on File Prior to Visit   Medication Sig Dispense Refill    buPROPion (WELLBUTRIN XL) 300 mg 24 hr tablet TAKE 1 TABLET BY MOUTH EVERY DAY 90 tablet 0    dextromethorphan-guaifenesin (MUCINEX DM)  MG per 12 hr tablet Take 1 tablet by mouth every 12 (twelve) hours 30 tablet 0    semaglutide, 0.25 or 0.5 mg/dose, (Ozempic, 0.25 or 0.5 MG/DOSE,) 2 mg/3 mL injection pen 0.25 mg under the skin every 7 days for 4 doses (28 days), THEN 0.5 mg under the skin every 7 days (Patient not taking: Reported on 11/10/2023) 9 mL 0     No current facility-administered medications on file prior to visit.     Allergies   Allergen Reactions    Acetaminophen-Codeine Hives    Codeine Sulfate Hives    Hydrocodone Hives    Hydrocodone-Acetaminophen Fever    Iodinated Contrast Media Rash, Vomiting and Hives    Iodine - Food Allergy Rash and Vomiting    Morphine Rash    Oxycodone Rash      Current Outpatient Medications on File Prior to Visit   Medication Sig Dispense Refill    buPROPion (WELLBUTRIN XL) 300 mg 24 hr tablet TAKE 1 TABLET BY MOUTH EVERY DAY 90 tablet 0    dextromethorphan-guaifenesin (MUCINEX DM)  MG per 12 hr tablet Take 1 tablet by mouth every 12 (twelve) hours 30 tablet 0    semaglutide, 0.25 or 0.5 mg/dose, (Ozempic, 0.25 or 0.5 MG/DOSE,) 2 mg/3 mL injection pen 0.25 mg under the skin every 7 days for 4 doses (28 days), THEN 0.5 mg under the skin every 7 days (Patient not taking: Reported on 11/10/2023) 9 mL 0     No current facility-administered medications on file prior to visit.      Social History     Tobacco Use    Smoking status: Never    Smokeless tobacco: Never   Vaping Use    Vaping status: Never Used   Substance and Sexual Activity    Alcohol use: Yes     Alcohol/week: 2.0 standard drinks of  "alcohol     Types: 2 Glasses of wine per week     Comment: Sporadic use.    Drug use: Yes     Frequency: 4.0 times per week     Types: Marijuana     Comment: Medical marijuana card for symptoms of anxiety and lupus    Sexual activity: Yes     Partners: Male     Birth control/protection: Surgical     Comment: Not sexually active because of my own dysfunction.     Objective     /70   Pulse 64   Temp (!) 94.6 °F (34.8 °C)   Ht 4' 11.5\" (1.511 m)   Wt 79.8 kg (176 lb)   LMP 01/18/2021 Comment:  Bleeding   SpO2 99%   BMI 34.95 kg/m²     Physical Exam  Vitals and nursing note reviewed.   Constitutional:       General: She is not in acute distress.     Appearance: She is normal weight. She is not ill-appearing, toxic-appearing or diaphoretic.   HENT:      Head: Normocephalic and atraumatic.      Nose: Nose normal.   Eyes:      General: No scleral icterus.        Right eye: No discharge.         Left eye: No discharge.      Extraocular Movements: Extraocular movements intact.      Conjunctiva/sclera: Conjunctivae normal.   Cardiovascular:      Rate and Rhythm: Normal rate and regular rhythm.      Heart sounds: No murmur heard.  Pulmonary:      Effort: Pulmonary effort is normal.      Breath sounds: Normal breath sounds.   Musculoskeletal:      Cervical back: Normal range of motion and neck supple.   Skin:     Findings: No rash.      Comments: No focal areas of hairloss. No short hair follicles.     No scalp rashes or scales.   Neurological:      Mental Status: She is alert. Mental status is at baseline.   Psychiatric:         Mood and Affect: Mood normal.         Behavior: Behavior normal.         Thought Content: Thought content normal.         Judgment: Judgment normal.       Administrative Statements           "

## 2024-06-06 ENCOUNTER — VBI (OUTPATIENT)
Dept: ADMINISTRATIVE | Facility: OTHER | Age: 54
End: 2024-06-06

## 2024-06-11 DIAGNOSIS — F33.41 RECURRENT MAJOR DEPRESSIVE DISORDER, IN PARTIAL REMISSION (HCC): ICD-10-CM

## 2024-06-11 DIAGNOSIS — L65.0 TELOGEN EFFLUVIUM: ICD-10-CM

## 2024-06-11 DIAGNOSIS — L64.9 ANDROGENIC ALOPECIA: ICD-10-CM

## 2024-06-11 RX ORDER — MINOXIDIL 2.5 MG/1
2.5 TABLET ORAL DAILY
Qty: 90 TABLET | Refills: 1 | Status: SHIPPED | OUTPATIENT
Start: 2024-06-11 | End: 2024-09-09

## 2024-06-11 RX ORDER — BUPROPION HYDROCHLORIDE 300 MG/1
300 TABLET ORAL DAILY
Qty: 90 TABLET | Refills: 1 | Status: SHIPPED | OUTPATIENT
Start: 2024-06-11

## 2024-07-01 ENCOUNTER — TELEPHONE (OUTPATIENT)
Age: 54
End: 2024-07-01

## 2024-07-01 NOTE — TELEPHONE ENCOUNTER
Pt returned call regarding message to reschedule yearly exam. Pt states she only wants to schedule with Dr. Rai. There were no appointments available in a reasonable timeframe. Please follow-up with pt for rescheduling.

## 2024-07-17 ENCOUNTER — ANNUAL EXAM (OUTPATIENT)
Dept: OBGYN CLINIC | Facility: CLINIC | Age: 54
End: 2024-07-17
Payer: COMMERCIAL

## 2024-07-17 VITALS
SYSTOLIC BLOOD PRESSURE: 132 MMHG | DIASTOLIC BLOOD PRESSURE: 72 MMHG | BODY MASS INDEX: 35.23 KG/M2 | WEIGHT: 177.4 LBS | HEART RATE: 68 BPM | OXYGEN SATURATION: 98 %

## 2024-07-17 DIAGNOSIS — Z12.4 CERVICAL CANCER SCREENING: ICD-10-CM

## 2024-07-17 DIAGNOSIS — Z12.31 ENCOUNTER FOR SCREENING MAMMOGRAM FOR BREAST CANCER: ICD-10-CM

## 2024-07-17 DIAGNOSIS — Z01.419 ENCOUNTER FOR ANNUAL ROUTINE GYNECOLOGICAL EXAMINATION: Primary | ICD-10-CM

## 2024-07-17 PROCEDURE — G0476 HPV COMBO ASSAY CA SCREEN: HCPCS | Performed by: OBSTETRICS & GYNECOLOGY

## 2024-07-17 PROCEDURE — G0145 SCR C/V CYTO,THINLAYER,RESCR: HCPCS | Performed by: OBSTETRICS & GYNECOLOGY

## 2024-07-17 PROCEDURE — S0612 ANNUAL GYNECOLOGICAL EXAMINA: HCPCS | Performed by: OBSTETRICS & GYNECOLOGY

## 2024-07-17 NOTE — PROGRESS NOTES
Assessment/Plan:   Encounter for annual routine gynecological examination  Pap/HPV collected  Mammogram ordered  Colonoscopy current    Encourage healthy diet, exercise, Calcium 1200mg per day and at least 800 iu Vitamin D daily.       Diagnoses and all orders for this visit:    Encounter for annual routine gynecological examination    Cervical cancer screening  -     Liquid-based pap, screening    Encounter for screening mammogram for breast cancer  -     Mammo screening bilateral w 3d & cad; Future          Subjective:     Patient ID: Marilou Thornton is a 54 y.o. female.    Patient presents for a routine annual visit  Last Pap Smear- 19 neg/neg--collect today  , denies VB  Mammogram-23   Colonoscopy-21 recall 10 years. Dr. De La Rosa    Non smoker  -some thc use  Social drinker  Currently not sexually active  No family history of uterine, ovarian, cervical or breast cancer    No concerns/questions for today's visit     Gynecologic Exam  She reports no genital itching, genital lesions, genital odor, genital rash, pelvic pain, vaginal bleeding or vaginal discharge. The patient is experiencing no pain. Pertinent negatives include no chills, constipation, diarrhea, fever, nausea, sore throat or vomiting.       Review of Systems   Constitutional:  Negative for activity change, appetite change, chills, fatigue and fever.   HENT:  Negative for rhinorrhea, sneezing and sore throat.    Eyes:  Negative for visual disturbance.   Respiratory:  Negative for cough, shortness of breath and wheezing.    Cardiovascular:  Negative for chest pain, palpitations and leg swelling.   Gastrointestinal:  Negative for abdominal distention, constipation, diarrhea, nausea and vomiting.   Genitourinary:  Negative for difficulty urinating, pelvic pain and vaginal discharge.   Neurological:  Negative for syncope and light-headedness.         Objective:     Physical Exam  Chest:   Breasts:     Breasts are symmetrical.       Right: No inverted nipple, mass, nipple discharge, skin change or tenderness.      Left: No inverted nipple, mass, nipple discharge, skin change or tenderness.   Genitourinary:     Labia:         Right: No rash, tenderness, lesion or injury.         Left: No rash, tenderness, lesion or injury.       Vagina: Normal. No vaginal discharge, erythema, tenderness or bleeding.      Cervix: No cervical motion tenderness, discharge, friability, erythema or cervical bleeding.      Uterus: Not deviated, not enlarged, not fixed and not tender.       Adnexa:         Right: No mass, tenderness or fullness.          Left: No mass, tenderness or fullness.     Neurological:      Mental Status: She is alert and oriented to person, place, and time.

## 2024-07-17 NOTE — ASSESSMENT & PLAN NOTE
Pap/HPV collected  Mammogram ordered  Colonoscopy current    Encourage healthy diet, exercise, Calcium 1200mg per day and at least 800 iu Vitamin D daily.

## 2024-07-23 LAB
LAB AP GYN PRIMARY INTERPRETATION: NORMAL
Lab: NORMAL

## 2024-10-09 DIAGNOSIS — L64.9 ANDROGENIC ALOPECIA: ICD-10-CM

## 2024-10-09 DIAGNOSIS — L65.0 TELOGEN EFFLUVIUM: ICD-10-CM

## 2024-10-09 RX ORDER — MINOXIDIL 2.5 MG/1
2.5 TABLET ORAL DAILY
Qty: 90 TABLET | Refills: 1 | Status: SHIPPED | OUTPATIENT
Start: 2024-10-09 | End: 2025-04-07

## 2024-10-09 NOTE — TELEPHONE ENCOUNTER
Reason for call: Not a Duplicate. [facility] stated that [facility] not [facility]ill.  [x] Refill   [] Prior Auth  [] Other:     Office:   [x] PCP/Provider - Yoselin Rosas DO /[facility] [place]   [] Specialty/Provider -     Medication: minoxidil (LONITEN) 2.5 mg tablet      Dose/Frequency:  Take 1 tablet (2.5 mg total) by mouth daily,     Quantity: 90    [facility]     Does the patient have enough for 3 days?   [] Yes   [x] No - Send as HP to POD

## 2024-10-15 NOTE — TELEPHONE ENCOUNTER
No menses in 2 years     Discharge is vaginal - clear thin discharge similar to when she used to ovulate. Patient states she is not that worried about it0 thinks its pretty normal but wanted to check just to be sure. [Initial Evaluation] : an initial evaluation

## 2024-11-09 ENCOUNTER — APPOINTMENT (OUTPATIENT)
Dept: LAB | Facility: HOSPITAL | Age: 54
End: 2024-11-09
Payer: COMMERCIAL

## 2024-11-09 DIAGNOSIS — E78.2 MIXED HYPERLIPIDEMIA: ICD-10-CM

## 2024-11-09 LAB
CHOLEST SERPL-MCNC: 230 MG/DL
HDLC SERPL-MCNC: 47 MG/DL
LDLC SERPL CALC-MCNC: 166 MG/DL (ref 0–100)
TRIGL SERPL-MCNC: 86 MG/DL

## 2024-11-09 PROCEDURE — 36415 COLL VENOUS BLD VENIPUNCTURE: CPT

## 2024-11-09 PROCEDURE — 82172 ASSAY OF APOLIPOPROTEIN: CPT

## 2024-11-09 PROCEDURE — 80061 LIPID PANEL: CPT

## 2024-11-09 PROCEDURE — 83695 ASSAY OF LIPOPROTEIN(A): CPT

## 2024-11-11 ENCOUNTER — OFFICE VISIT (OUTPATIENT)
Age: 54
End: 2024-11-11
Payer: COMMERCIAL

## 2024-11-11 VITALS
WEIGHT: 173.4 LBS | OXYGEN SATURATION: 98 % | HEIGHT: 60 IN | DIASTOLIC BLOOD PRESSURE: 70 MMHG | TEMPERATURE: 95.7 F | BODY MASS INDEX: 34.04 KG/M2 | SYSTOLIC BLOOD PRESSURE: 102 MMHG | HEART RATE: 79 BPM

## 2024-11-11 DIAGNOSIS — E55.9 VITAMIN D INSUFFICIENCY: ICD-10-CM

## 2024-11-11 DIAGNOSIS — E66.811 OBESITY, CLASS I, BMI 30-34.9: ICD-10-CM

## 2024-11-11 DIAGNOSIS — F41.9 ANXIETY: ICD-10-CM

## 2024-11-11 DIAGNOSIS — Z13.220 ENCOUNTER FOR LIPID SCREENING FOR CARDIOVASCULAR DISEASE: ICD-10-CM

## 2024-11-11 DIAGNOSIS — F33.41 RECURRENT MAJOR DEPRESSIVE DISORDER, IN PARTIAL REMISSION (HCC): ICD-10-CM

## 2024-11-11 DIAGNOSIS — Z00.00 HEALTHCARE MAINTENANCE: Primary | ICD-10-CM

## 2024-11-11 DIAGNOSIS — E78.2 MIXED HYPERLIPIDEMIA: ICD-10-CM

## 2024-11-11 DIAGNOSIS — Z13.6 ENCOUNTER FOR LIPID SCREENING FOR CARDIOVASCULAR DISEASE: ICD-10-CM

## 2024-11-11 LAB — LPA SERPL-SCNC: 54.7 NMOL/L

## 2024-11-11 PROCEDURE — 99214 OFFICE O/P EST MOD 30 MIN: CPT | Performed by: FAMILY MEDICINE

## 2024-11-11 PROCEDURE — 99396 PREV VISIT EST AGE 40-64: CPT | Performed by: FAMILY MEDICINE

## 2024-11-11 RX ORDER — ATORVASTATIN CALCIUM 20 MG/1
20 TABLET, FILM COATED ORAL DAILY
Qty: 90 TABLET | Refills: 3 | Status: SHIPPED | OUTPATIENT
Start: 2024-11-11

## 2024-11-11 NOTE — ASSESSMENT & PLAN NOTE
Reviewed & discussed labs including lipid panel   11/09/2024 Triglycerides/HDL ratio: 1.83. Goal <2.  Apolipoprotein B: pending completion, Goal <60  Lipoprotein a: pending completion  Currently not prescribed any meds.   Uncontrolled. Currently at high risk of chronic diseases including risk of heart disease. Reviewed with pt and would recommend statins to help with lowering risk  Med changes: Yes, start atorvastatin 20 mg at nighttime.     Advised improving nutritional intake and exercising regularly, really focusing on building good habits as discussed.  Goal of <60 Apolipoprotein B & LDL, <100 triglycerides, and >60 HDL  Monitor lipid panel in 6 months  Orders:    atorvastatin (LIPITOR) 20 mg tablet; Take 1 tablet (20 mg total) by mouth daily    Lipid panel; Future    Apolipoprotein B; Future

## 2024-11-11 NOTE — ASSESSMENT & PLAN NOTE
Refer to depression problem list.   Orders:    Comprehensive metabolic panel    CBC and differential    TSH + Free T4

## 2024-11-11 NOTE — PATIENT INSTRUCTIONS
Recommendations  CoQ10         Together as a team our goal is to practice preventative care, avoid chronic diseases, and/or avoid further progression of current chronic diseases.   Here are my recommendations as we discussed during our office visit:    Exercise:  150 minutes of moderate intensity workout for overall general health  200-300 minutes of moderate intensity workout for weight loss.   The first 30 minutes of exercising, the body will take energy from what we ate that day. Then after that 30-minute andrew, the body will take energy from the stored fats.  Therefore, it will be more beneficial to do longer sessions and less frequently in a week to help with our weight loss journey.  I would recommend 60-minute sessions 4 times a week   Strength training 2 times a week for good bone health    Nutritional intake (diet):  Remember, it is not about finding a diet to lose weight quickly, rather adjusting your lifestyle for healthy living long-term.   When we build good habits, it does not become difficult to maintain it.  Focus on a low-carb diet.  The best diet is Mediterranean diet, which is a low-carb diet.  There are good carbs and bad carbs, minimize the bad carbs.    Bad carbs: Refined carbohydrates or simple carbohydrates that have been processed and stripped of their natural fiber and nutrients.          These carbohydrates can lead to rapid spikes in blood sugar levels and are often associated with low nutritional value. The big 4: Bread, Rice, Pasta, Potatoes  Refined grains-white bread, white rice, pasta made from refined flour.  Sugary foods and beverages: Candy, pastries, sugary cereals, soda, and other sugary drinks.  Processed snacks: Chips, cookies, sugary granola bars  Sweetened breakfast cereals: Cereals with added sugars.  Sweets and desserts: Cakes, ice cream, pies  Good carbs: These are referred to complex carbohydrates that are unprocessed or minimally processed, providing more nutrients.   Here examples of good carbs:  Whole grains: Brown rice, quinoa, oats, whole-wheat products   Legumes: Lentils, chickpeas, black beans, kidney beans  Starchy vegetables: Sweet potatoes, butternut squash  Whole fruits: Berries, apples, oranges, bananas  Vegetables: Broccoli, spinach, kale, [place] sprouts  Nuts and seeds: Almonds, walnuts, Kranthi seeds, flaxseeds.    Focus on eating whole foods.  What are whole foods?  Whole Foods refer to natural, unprocessed, or minimally processed foods that are as close to the original form as possible.  These foods are in their natural state and have undergone little to no refined or alteration.  Whole Foods are valued for their nutrient density, providing essential vitamins, minerals, fiber, and other beneficial compounds.  Examples of whole foods include:  Fruits and vegetables without added preservatives or processing.    Whole grains-unrefined grains like brown rice, quinoa, and whole-wheat, which retain their brain, germ, and endosperm.  Legumes-beans, lentils, and peas in their national unprocessed date.  Nuts and seeds-on roasted, unsalted nuts and seeds without added oils or seasonings.  Meat and fish-fresh, unprocessed meats and fish without additives or preservatives.  Dairy-unprocessed dairy products such as milk, yogurt, and cheese without added sugars or artificial ingredients.  Eggs-eggs in their natural form without additives.    Protein requirement  Calculate your protein requirement in grams by multiplying your weight in kilograms by the recommended protein intake per kilogram.  This gives you an estimate of your daily protein requirement.  Age 15-18: 0.9 grams of protein per kilogram of body weight from male gender, 0.9 g of protein per kilogram of body weight for female gender.  Age 19+: 0.8 g of protein per kilogram of body weight for both male and female gender.  If you are physically active or trying to build muscle: 1.2-2.2 g/kg  Example: if you weigh 70 kg,  to calculate your protein intake per day multiply 70 by 0.8 = 56 grams per day  To convert your weight to kilograms from pounds: Take your weight in pounds and divided by 2.2046 to get your weight to kilograms.    Sodium/salt  Compare sodium in foods like soup, bread, frozen and packaged meals, then choose the one with lower numbers.  Most Americans should limit their sodium intake to less than 2,300 mg/day.  All -Americans, people with diabetes, high blood pressure, kidney disease, should limit their sodium intake to 1,500 mg/day.    Cooking oil  Avoid the following oil for cooking: Canola, corn, vegetable, sunflower, peanut, sesame, grapeseed, flaxseed.  Even though it states it is heart healthy, however, they are significantly processed and refined.   Would recommend instead the following cooking oil: Olive oil, coconut oil, avocado oil, ghee    Intermittent fasting:   There are several benefits of intermittent fasting including weight loss, improving insulin sensitivity, improving cardiovascular health by reducing risk factors like blood pressure, cholesterol levels, and triglycerides. It also promotes brain health, longevity, reduction in inflammatory markers, improves metabolic health, and some studies even suggest intermittent fasting to be protective against certain types of cancers.     The goal of intermittent fasting is to shutdown the insulin hormone, as we discussed, which is a hormone that negatively affects our health when it is around in high levels chronically.     Start intermittent fasting for 14 hours initially, then increase to 16 hours, with a goal to reach 18 hours.  During fasting - may drink water without any additives such as sweeteners, black coffee, tea without any additives including milk.   Eat nutritious meals 2 times a day  Avoid snacking in between meals.  If you end up snacking, snack on fruits/vegetables.  Initially it might be difficult, however, over time you will notice a  positive change in your energy, mood, and weight.

## 2024-11-11 NOTE — PROGRESS NOTES
[facility]  Annual Physical & Office Visit     PATIENT INFORMATION   Name: Marilou Thornton   YOB: 1970   MRN: [id number]  Encounter Provider: Brijesh Rowland MD    Encounter Date: 11/11/2024    ASSESSMENT & PLAN     Assessment & Plan  Healthcare maintenance  Healthcare Maintenance  Health maintenance completed today  - Medical history reviewed, including existing medical conditions, medications, and surgeries.   - Labs discussed to evaluate cholesterol, blood sugar, kidney function, liver function, and other important markers of health.  - BMI evaluated and discussed.  - Lifestyle and health counseling completed including diet, exercise habits, smoking status, alcohol consumption.   - Bone & Heart health reviewed  - Cancer screenings discussed: Mammogram/Pap smear/CT lung/colonoscopy.   - Vaccination status reviewed and pertinent immunizations and booster shots discussed.  - Skin examination: Discussed importance of sunscreen and other preventative measures for skin cancer.  - Mental health and wellbeing evaluated and discussed.  - Family history obtained to identify any of hereditary health risks.  Lab orders in place as discussed  Start/continue preventative measures as discussed/advised  Complete preventative orders in place as recommended.   Refer to screenings problem list   Orders:    Comprehensive metabolic panel    CBC and differential    Recurrent major depressive disorder, in partial remission (HCC)  Currently prescribed: wllbutrin 300 mg ER daily  Today reports: Taking as prescribed.  Controlled.    Med Adjusted: None. Continue current regimen.           Anxiety  Refer to depression problem list.   Orders:    Comprehensive metabolic panel    CBC and differential    TSH + Free T4    Mixed hyperlipidemia  Reviewed & discussed labs including lipid panel   11/09/2024 Triglycerides/HDL ratio: 1.83. Goal <2.  Apolipoprotein B: pending completion, Goal <60  Lipoprotein a: pending  completion  Currently not prescribed any meds.   Uncontrolled. Currently at high risk of chronic diseases including risk of heart disease. Reviewed with pt and would recommend statins to help with lowering risk  Med changes: Yes, start atorvastatin 20 mg at nighttime.     Advised improving nutritional intake and exercising regularly, really focusing on building good habits as discussed.  Goal of <60 Apolipoprotein B & LDL, <100 triglycerides, and >60 HDL  Monitor lipid panel in 6 months  Orders:    atorvastatin (LIPITOR) 20 mg tablet; Take 1 tablet (20 mg total) by mouth daily    Lipid panel; Future    Apolipoprotein B; Future    Obesity, Class I, BMI 30-34.9  Wt Readings from Last 3 Encounters:   11/11/24 78.7 kg (173 lb 6.4 oz)   07/17/24 80.5 kg (177 lb 6.4 oz)   06/05/24 79.8 kg (176 lb)   Initial weight (11/11/24): 173 lbs, Body mass index is 34.44 kg/m².   TWL: - lbs  Currently not prescribed any meds.   List of medications previously prescribed: Ozempic insurance does not cover  Today pt reports:  currently in a weight management program.   Assessment: Uncontrolled.  Continues to struggle with weight loss  Med management:None. Continue working on lifestyle only.   Recommended focusing on building good habits over time by setting and being consistent with realistic goals.   Improve nutritional intake (recommended Mediterranean diet, low-carb diet)  Calorie control (creating a modest calorie deficit of 500-1000 -calorie/day)  Monitoring portion size and frequency of intake, limiting snacking as discussed  Aiming for Whole Foods and healthy fats  Limiting added sugars and processed foods as discussed  Exercising and move body throughout the day and regularly as discussed  Prioritize sleep and mental health  Return in 6 months    Triglycerides at goal, <100. However, LDL significant high. Discussed nutritional improvement and increasing exercise. Refer to mix hyperlipidemia problem list.   - American cheese.         Vitamin D insufficiency    Orders:    Vitamin D 25 hydroxy    Encounter for lipid screening for cardiovascular disease            COUNSELING    Alcohol/drug use: discussed moderation in alcohol intake, the recommendations for healthy alcohol use, and avoidance of illicit drug use.  Dental Health: discussed importance of regular tooth brushing, flossing, and dental visits.  Injury prevention: discussed safety/seat belts, safety helmets, smoke detectors, carbon monoxide detectors, and smoking near bedding or upholstery.  Sexual health: discussed sexually transmitted diseases, partner selection, use of condoms, avoidance of unintended pregnancy, and contraceptive alternatives.  Exercise: the importance of regular exercise/physical activity was discussed. Recommend exercise 3-5 times per week for at least 30 minutes.      HEALTH MAINTENANCE     Immunization History   Administered Date(s) Administered    COVID-19 MODERNA VACC 0.5 ML IM 02/05/2021, 02/25/2021, 09/02/2021    COVID-19 Pfizer mRNA vacc PF kyle-sucrose 12 yr and older (Comirnaty) 10/31/2024    COVID-19 Pfizer vac (Kyle-sucrose, gray cap) 12 yr+ IM 08/13/2022    INFLUENZA 10/10/2014, 10/09/2015, 08/29/2017, 08/28/2018, 08/25/2020, 08/31/2021    Influenza Quadrivalent Preservative Free 3 years and older IM 08/29/2017    Influenza, injectable, quadrivalent, preservative free 0.5 mL 08/28/2018, 08/27/2019    Tdap 01/06/2020     Pap smear:07/17/2024  Mammogram:09/14/2023   Colonoscopy:02/05/2021 02/05/2021  Cologuard:Not on file   DEXA scan:Not on file      FOLLOW UP   Return in about 6 months (around 5/11/2025) for routine follow up, after completion of labs.    CURRENT MEDICATIONS     Current Outpatient Medications:     atorvastatin (LIPITOR) 20 mg tablet, Take 1 tablet (20 mg total) by mouth daily, Disp: 90 tablet, Rfl: 3    buPROPion (WELLBUTRIN XL) 300 mg 24 hr tablet, Take 1 tablet (300 mg total) by mouth daily, Disp: 90 tablet, Rfl: 1    minoxidil  (LONITEN) 2.5 mg tablet, Take 1 tablet (2.5 mg total) by mouth daily, Disp: 90 tablet, Rfl: 1    ANNUAL PHYSICAL QUESTIONNAIRE    History of Present Illness     Marilou Thornton is a 54 y.o. who is here for annual physical exam.  History obtained from : patient  Marilou Thornton reports living with  and two teenage sons.   Eduction/Work: teacher         Concerns today: yes    Diet and Physical Activity  Diet: well balanced diet  Exercise: 5-7 times a week on average  Do you struggle with your weight? yes    General Health  Sleep: gets 4-6 hours of sleep on average   Hearing: normal - bilateral  Vision: wears contacts  Dental: regular dental visits and brushes teeth twice daily    Mental Health  PHQ-2/9 Depression Screening    Little interest or pleasure in doing things: 0 - not at all  Feeling down, depressed, or hopeless: 0 - not at all  Trouble falling or staying asleep, or sleeping too much: 0 - not at all  Feeling tired or having little energy: 1 - several days  Poor appetite or overeatin - not at all  Feeling bad about yourself - or that you are a failure or have let yourself or your family down: 0 - not at all  Trouble concentrating on things, such as reading the newspaper or watching television: 0 - not at all  Moving or speaking so slowly that other people could have noticed. Or the opposite - being so fidgety or restless that you have been moving around a lot more than usual: 0 - not at all  Thoughts that you would be better off dead, or of hurting yourself in some way: 0 - not at all  PHQ-9 Score: 1  PHQ-9 Interpretation: No or Minimal depression       Anxiety: yes  History of SI/SH: no  Significant past trauma that has impacted patient's mental health: no  Coping mechanisms with life's worries and obstacles: yes, practices mindfulness and meditation.     /GYN Health  Menstrual cycles: about 4 years ago.  Sexually Active: Yes   Urinary symptoms: none    Smoking/Alcohol Use:  Smoking Cig:  no  Vaping: no  Recreational drugs: yes, medical marijuana - once a week.   Alcohol consumption: yes, once every 3 weeks or so.     Review of Systems   Constitutional:  Negative for chills, fever and unexpected weight change.   Respiratory:  Negative for chest tightness and shortness of breath.    Cardiovascular:  Negative for chest pain.   Gastrointestinal:  Positive for constipation. Negative for abdominal pain, diarrhea, nausea and vomiting.   Endocrine: Negative for polydipsia and polyuria.   Genitourinary:  Negative for dysuria and hematuria.   Neurological:  Negative for dizziness, weakness, light-headedness and headaches.       ALLERGIES      Allergies   Allergen Reactions    Acetaminophen-Codeine Hives    Codeine Sulfate Hives    Hydrocodone Hives    Hydrocodone-Acetaminophen Fever    Iodinated Contrast Media Rash, Vomiting and Hives    Iodine - Food Allergy Rash and Vomiting    Morphine Rash    Oxycodone Rash       PAST MEDICAL & SURGICAL HISTORY      Past Medical History:   Diagnosis Date    Allergic Unsure    Anxiety     Arthritis     Chronic pain of right knee 2016    Last assessed     Depression     Fibroids, submucosal 02/15/2021    Headache(784.0) 2016    Lupus 2014    Migrainous headache without aura 2016    Last assessed    Obesity 1984    Raynaud disease     Rheumatoid arthritis (HCC)     Sjogren's disease (HCC)     Visual impairment 2014    Sjogrens     Past Surgical History:   Procedure Laterality Date    APPENDECTOMY      BREAST BIOPSY Left 2017    benign    CARPAL TUNNEL RELEASE Bilateral      SECTION      x2    KNEE ARTHROSCOPY Right     MYOMECTOMY      MYOMECTOMY      MYOMECTOMY N/A 2021    Procedure: MYOMECTOMY;  Surgeon: Kandice Rai MD;  Location: [facility];  Service: Gynecology    NV HYSTEROSCOPY BX ENDOMETRIUM&/POLYPC W/WO D&C N/A 2021    Procedure: DILATATION AND CURETTAGE (D&C) WITH HYSTEROSCOPY;  Surgeon: Kandice Rai  "MD;  Location: [facility] OR;  Service: Gynecology    TUBAL LIGATION  2009       FAMILY HISTORY & SOCIAL HISTORY      Family History   Problem Relation Age of Onset    Other Mother         Back disorder     Spina bifida Mother     Arthritis Mother     Diabetes Father     Hypertension Father     Other Sister         Back disorder     No Known Problems Maternal Grandmother     No Known Problems Maternal Grandfather     Lymphoma Paternal Grandmother     Cancer Paternal Grandmother     No Known Problems Paternal Grandfather     No Known Problems Brother     No Known Problems Brother     No Known Problems Son     No Known Problems Son     No Known Problems Maternal Aunt     No Known Problems Maternal Aunt     No Known Problems Maternal Aunt     No Known Problems Paternal Aunt     Heart disease Neg Hx     Stroke Neg Hx     Thyroid disease Neg Hx     Breast cancer Neg Hx     Colon cancer Neg Hx     Ovarian cancer Neg Hx     BRCA1 Positive Neg Hx     BRCA 1/2 Neg Hx     Endometrial cancer Neg Hx     BRCA2 Negative Neg Hx     BRCA1 Negative Neg Hx     BRCA2 Positive Neg Hx     Breast cancer additional onset Neg Hx       Social History     Tobacco Use    Smoking status: Never    Smokeless tobacco: Never   Vaping Use    Vaping status: Never Used   Substance and Sexual Activity    Alcohol use: Yes     Alcohol/week: 2.0 standard drinks of alcohol     Types: 2 Glasses of wine per week     Comment: Sporadic use.    Drug use: Yes     Frequency: 1.0 times per week     Types: Marijuana     Comment: Medical marijuana card for symptoms of anxiety and lupus    Sexual activity: Yes     Partners: Male     Birth control/protection: Surgical     Comment: Not sexually active because of my own dysfunction.        OBJECTIVES      /70   Pulse 79   Temp (!) 95.7 °F (35.4 °C)   Ht 4' 11.5\" (1.511 m)   Wt 78.7 kg (173 lb 6.4 oz)   LMP 01/18/2021 Comment:  Bleeding   SpO2 98%   BMI 34.44 kg/m²    Physical Exam  Vitals reviewed. "   Constitutional:       General: She is not in acute distress.     Appearance: Normal appearance. She is obese. She is not ill-appearing, toxic-appearing or diaphoretic.   HENT:      Head: Normocephalic and atraumatic.      Right Ear: External ear normal.      Left Ear: External ear normal.      Nose: Nose normal.      Mouth/Throat:      Mouth: Mucous membranes are moist.   Eyes:      General: No scleral icterus.        Right eye: No discharge.         Left eye: No discharge.      Extraocular Movements: Extraocular movements intact.      Conjunctiva/sclera: Conjunctivae normal.   Cardiovascular:      Rate and Rhythm: Normal rate and regular rhythm.      Pulses: Normal pulses.      Heart sounds: Normal heart sounds.   Pulmonary:      Effort: Pulmonary effort is normal. No respiratory distress.      Breath sounds: Normal breath sounds.   Abdominal:      Palpations: Abdomen is soft.      Tenderness: There is no abdominal tenderness.   Musculoskeletal:         General: No swelling. Normal range of motion.      Cervical back: Normal range of motion.   Skin:     General: Skin is warm and dry.   Neurological:      General: No focal deficit present.      Mental Status: She is alert and oriented to person, place, and time.   Psychiatric:         Mood and Affect: Mood normal.         Behavior: Behavior normal.         Thought Content: Thought content normal.           Brijesh Rowland MD  Family Medicine Physician   [facility] [place]      Administrative Statements     Medications have been reviewed by provider in current encounter

## 2024-11-11 NOTE — ASSESSMENT & PLAN NOTE
Wt Readings from Last 3 Encounters:   11/11/24 78.7 kg (173 lb 6.4 oz)   07/17/24 80.5 kg (177 lb 6.4 oz)   06/05/24 79.8 kg (176 lb)   Initial weight (11/11/24): 173 lbs, Body mass index is 34.44 kg/m².   TWL: - lbs  Currently not prescribed any meds.   List of medications previously prescribed: Ozempic insurance does not cover  Today pt reports: currently in a weight management program.   Assessment: Uncontrolled.  Continues to struggle with weight loss  Med management:None. Continue working on lifestyle only.   Recommended focusing on building good habits over time by setting and being consistent with realistic goals.   Improve nutritional intake (recommended Mediterranean diet, low-carb diet)  Calorie control (creating a modest calorie deficit of 500-1000 -calorie/day)  Monitoring portion size and frequency of intake, limiting snacking as discussed  Aiming for Whole Foods and healthy fats  Limiting added sugars and processed foods as discussed  Exercising and move body throughout the day and regularly as discussed  Prioritize sleep and mental health  Return in 6 months    Triglycerides at goal, <100. However, LDL significant high. Discussed nutritional improvement and increasing exercise. Refer to mix hyperlipidemia problem list.   - American cheese.

## 2024-11-11 NOTE — ASSESSMENT & PLAN NOTE
Currently prescribed: wllbutrin 300 mg ER daily  Today reports: Taking as prescribed.  Controlled.    Med Adjusted: None. Continue current regimen.

## 2024-11-12 LAB — APO B SERPL-MCNC: 130 MG/DL

## 2024-11-15 DIAGNOSIS — L65.0 TELOGEN EFFLUVIUM: ICD-10-CM

## 2024-11-15 DIAGNOSIS — L64.9 ANDROGENIC ALOPECIA: ICD-10-CM

## 2024-11-18 RX ORDER — MINOXIDIL 2.5 MG/1
2.5 TABLET ORAL DAILY
Qty: 90 TABLET | Refills: 1 | Status: SHIPPED | OUTPATIENT
Start: 2024-11-18 | End: 2025-05-17

## 2024-11-27 ENCOUNTER — TELEPHONE (OUTPATIENT)
Age: 54
End: 2024-11-27

## 2024-11-27 DIAGNOSIS — L64.9 ANDROGENIC ALOPECIA: ICD-10-CM

## 2024-11-27 DIAGNOSIS — L65.0 TELOGEN EFFLUVIUM: ICD-10-CM

## 2024-11-27 NOTE — TELEPHONE ENCOUNTER
Patient is requesting a 90 sat supply and 3 refills of the following medication:minoxidil (LONITEN) 2.5 mg tablet and the [facility] number [facility] [email or web address] [facility] [address] Phone [phone] and fax is [phone]

## 2024-12-02 RX ORDER — MINOXIDIL 2.5 MG/1
2.5 TABLET ORAL DAILY
Qty: 90 TABLET | Refills: 1 | Status: SHIPPED | OUTPATIENT
Start: 2024-12-02 | End: 2025-05-31

## 2024-12-26 ENCOUNTER — HOSPITAL ENCOUNTER (OUTPATIENT)
Dept: MAMMOGRAPHY | Facility: CLINIC | Age: 54
End: 2024-12-26
Payer: COMMERCIAL

## 2024-12-26 VITALS — WEIGHT: 173 LBS | HEIGHT: 60 IN | BODY MASS INDEX: 33.96 KG/M2

## 2024-12-26 DIAGNOSIS — Z12.31 ENCOUNTER FOR SCREENING MAMMOGRAM FOR BREAST CANCER: ICD-10-CM

## 2024-12-26 PROCEDURE — 77067 SCR MAMMO BI INCL CAD: CPT

## 2024-12-26 PROCEDURE — 77063 BREAST TOMOSYNTHESIS BI: CPT

## 2024-12-27 DIAGNOSIS — Z00.6 ENCOUNTER FOR EXAMINATION FOR NORMAL COMPARISON OR CONTROL IN CLINICAL RESEARCH PROGRAM: ICD-10-CM

## 2025-01-02 ENCOUNTER — APPOINTMENT (OUTPATIENT)
Dept: LAB | Facility: HOSPITAL | Age: 55
End: 2025-01-02
Attending: PATHOLOGY

## 2025-01-02 DIAGNOSIS — Z00.6 ENCOUNTER FOR EXAMINATION FOR NORMAL COMPARISON OR CONTROL IN CLINICAL RESEARCH PROGRAM: ICD-10-CM

## 2025-01-02 PROCEDURE — 36415 COLL VENOUS BLD VENIPUNCTURE: CPT

## 2025-01-20 LAB
APOB+LDLR+PCSK9 GENE MUT ANL BLD/T: NOT DETECTED
BRCA1+BRCA2 DEL+DUP + FULL MUT ANL BLD/T: NOT DETECTED
MLH1+MSH2+MSH6+PMS2 GN DEL+DUP+FUL M: NOT DETECTED

## 2025-01-24 DIAGNOSIS — F33.41 RECURRENT MAJOR DEPRESSIVE DISORDER, IN PARTIAL REMISSION (HCC): ICD-10-CM

## 2025-01-24 RX ORDER — BUPROPION HYDROCHLORIDE 300 MG/1
300 TABLET ORAL DAILY
Qty: 90 TABLET | Refills: 3 | Status: SHIPPED | OUTPATIENT
Start: 2025-01-24

## 2025-02-14 ENCOUNTER — TELEPHONE (OUTPATIENT)
Age: 55
End: 2025-02-14

## 2025-02-14 ENCOUNTER — PATIENT MESSAGE (OUTPATIENT)
Age: 55
End: 2025-02-14

## 2025-02-14 DIAGNOSIS — E66.811 OBESITY, CLASS I, BMI 30-34.9: Primary | ICD-10-CM

## 2025-02-14 RX ORDER — TIRZEPATIDE 2.5 MG/.5ML
2.5 INJECTION, SOLUTION SUBCUTANEOUS WEEKLY
Qty: 6 ML | Refills: 0 | Status: SHIPPED | OUTPATIENT
Start: 2025-02-14

## 2025-02-14 NOTE — TELEPHONE ENCOUNTER
Reason for call:   [x] Prior Auth  [] Other:     Caller:  [x] Patient (Iramhart message)  [] Pharmacy  Name:   Address:   Callback Number:     Medication: tirzepatide (Zepbound) 2.5 mg/0.5 mL auto-injector     Dose/Frequency: Inject 0.5 mL (2.5 mg total) under the skin once a week     Quantity: 6 mL    Ordering Provider:   [x] PCP/Provider -   [] Speciality/Provider -     Has the patient tried other medications and failed? If failed, which medications did they fail?    [] No   [] Yes -     Is the patient's insurance updated in EPIC?   [] Yes   [] No     Is a copy of the patient's insurance scanned in EPIC?   [] Yes   [] No

## 2025-02-17 NOTE — TELEPHONE ENCOUNTER
PA for Zepbound) 2.5 mg/0.5 mL auto-injector SUBMITTED to     via    [x][facility]-KEY: [id number]  []Surescripts-Case ID #   []Availity-Auth ID # NDC #   []Faxed to plan   []Other website   []Phone call Case ID #     [x]PA sent as URGENT    All office notes, labs and other pertaining documents and studies sent. Clinical questions answered. Awaiting determination from insurance company.     Turnaround time for your insurance to make a decision on your Prior Authorization can take 7-21 business days.

## 2025-02-17 NOTE — TELEPHONE ENCOUNTER
PA for Zepbound) 2.5 mg/0.5 mL auto-injector  APPROVED     Date(s) approved 12/17/2024 to 09/16/2025      Patient advised by          []mygolahart Message  []Phone call   [x]LMOM  []L/M to call office as no active Communication consent on file  []Unable to leave detailed message as VM not approved on Communication consent       Pharmacy advised by    [x]Fax  []Phone call       Approval letter scanned into Media Yes

## 2025-02-18 NOTE — TELEPHONE ENCOUNTER
Patient called wanting to verify that her prescription for Zepbound was send to the [facility] home [facility] as she received a called today that her PA was approved. No further questions.

## 2025-03-10 ENCOUNTER — PATIENT MESSAGE (OUTPATIENT)
Age: 55
End: 2025-03-10

## 2025-03-10 DIAGNOSIS — E66.811 OBESITY, CLASS I, BMI 30-34.9: Primary | ICD-10-CM

## 2025-03-11 ENCOUNTER — TELEPHONE (OUTPATIENT)
Age: 55
End: 2025-03-11

## 2025-03-11 RX ORDER — TIRZEPATIDE 5 MG/.5ML
5 INJECTION, SOLUTION SUBCUTANEOUS WEEKLY
Qty: 6 ML | Refills: 1 | Status: SHIPPED | OUTPATIENT
Start: 2025-03-11 | End: 2025-03-11

## 2025-03-11 RX ORDER — TIRZEPATIDE 5 MG/.5ML
5 INJECTION, SOLUTION SUBCUTANEOUS WEEKLY
Qty: 6 ML | Refills: 1 | Status: SHIPPED | OUTPATIENT
Start: 2025-03-11 | End: 2025-03-13 | Stop reason: SDUPTHER

## 2025-03-11 NOTE — TELEPHONE ENCOUNTER
[facility] called stating [facility] and after calculating the cost she says she would still have to pay $150 for the zepbound. Patient is requesting for PCP to send it to [facility] instead since the cost is not much of a difference and she doesn't wasn't to end up with 3 months supply of 1 dose since she has to up the dose each month.         Please send Zepbound script to:   [facility]/pharmacy #[id number] - [place] -   [address] [phone]

## 2025-03-13 DIAGNOSIS — E66.811 OBESITY, CLASS I, BMI 30-34.9: ICD-10-CM

## 2025-03-13 RX ORDER — TIRZEPATIDE 5 MG/.5ML
5 INJECTION, SOLUTION SUBCUTANEOUS WEEKLY
Qty: 2 ML | Refills: 0 | Status: SHIPPED | OUTPATIENT
Start: 2025-03-13

## 2025-03-13 NOTE — TELEPHONE ENCOUNTER
LMOVM as per [facility] informing patient script was sent to [facility] pharmacy [address].  Provided number to call back with any questions

## 2025-04-03 ENCOUNTER — OFFICE VISIT (OUTPATIENT)
Age: 55
End: 2025-04-03
Payer: COMMERCIAL

## 2025-04-03 VITALS
TEMPERATURE: 97.6 F | WEIGHT: 163.4 LBS | HEIGHT: 60 IN | OXYGEN SATURATION: 98 % | DIASTOLIC BLOOD PRESSURE: 70 MMHG | SYSTOLIC BLOOD PRESSURE: 102 MMHG | HEART RATE: 75 BPM | RESPIRATION RATE: 14 BRPM | BODY MASS INDEX: 32.08 KG/M2

## 2025-04-03 DIAGNOSIS — M05.762 RHEUMATOID ARTHRITIS INVOLVING BOTH KNEES WITH POSITIVE RHEUMATOID FACTOR (HCC): ICD-10-CM

## 2025-04-03 DIAGNOSIS — E78.2 MIXED HYPERLIPIDEMIA: ICD-10-CM

## 2025-04-03 DIAGNOSIS — E66.811 OBESITY, CLASS I, BMI 30-34.9: Primary | Chronic | ICD-10-CM

## 2025-04-03 DIAGNOSIS — M05.761 RHEUMATOID ARTHRITIS INVOLVING BOTH KNEES WITH POSITIVE RHEUMATOID FACTOR (HCC): ICD-10-CM

## 2025-04-03 DIAGNOSIS — M32.9 SYSTEMIC LUPUS ERYTHEMATOSUS, UNSPECIFIED SLE TYPE, UNSPECIFIED ORGAN INVOLVEMENT STATUS (HCC): ICD-10-CM

## 2025-04-03 PROCEDURE — 99214 OFFICE O/P EST MOD 30 MIN: CPT | Performed by: FAMILY MEDICINE

## 2025-04-03 RX ORDER — TIRZEPATIDE 5 MG/.5ML
5 INJECTION, SOLUTION SUBCUTANEOUS WEEKLY
Qty: 6 ML | Refills: 0 | Status: SHIPPED | OUTPATIENT
Start: 2025-04-03

## 2025-04-03 NOTE — PROGRESS NOTES
[facility]  Ambulatory Office Visit     PATIENT INFORMATION   Name: Marilou Thornton   YOB: 1970   MRN: [id number]  Encounter Provider: Brijesh Rowland MD    Encounter Date: 4/3/2025    ASSESSMENT & PLAN     Assessment & Plan  Obesity, Class I, BMI 30-34.9  Wt Readings from Last 3 Encounters:   04/03/25 74.1 kg (163 lb 6.4 oz)   12/26/24 78.5 kg (173 lb)   11/11/24 78.7 kg (173 lb 6.4 oz)   Initial weight (11/11/24): 173 lbs, Body mass index is 34.44 kg/m².   TWL: -10 lbs  Previously: Tried Ozempic-insurance did not cover.  Patient requested sending injectable to [facility].  Currently prescribed anti-obesity meds.  Zepbound 5 mg once a week injection  Reports taking as prescribed.  Assessment: Improving weight/BMI.   Would benefit from staying on Zepbound 5 mg longer, has been only taking this dosage for couple weeks now.  Med management:Continue current regimen as prescribed without changes.   Refill provided  Orders:  •  tirzepatide (Zepbound) 5 mg/0.5 mL auto-injector; Inject 0.5 mL (5 mg total) under the skin once a week      Rheumatoid arthritis involving both knees with positive rheumatoid factor (HCC)  Follows rheumatology, continue care with specialist.       Mixed hyperlipidemia  Currently on atorvastatin 20 mg daily.  Has pending labs to do prior to next appointment in June.       Systemic lupus erythematosus, unspecified SLE type, unspecified organ involvement status (HCC)  Stable            HEALTH MAINTENANCE     Immunization History   Administered Date(s) Administered   • COVID-19 MODERNA VACC 0.5 ML IM 02/05/2021, 02/25/2021, 09/02/2021   • COVID-19 Pfizer mRNA vacc PF kyle-sucrose 12 yr and older (Comirnaty) 10/31/2024   • COVID-19 Pfizer vac (Kyle-sucrose, gray cap) 12 yr+ IM 08/13/2022   • INFLUENZA 10/10/2014, 10/09/2015, 08/29/2017, 08/28/2018, 08/25/2020, 08/31/2021, 10/17/2022, 11/14/2023   • Influenza Quadrivalent Preservative Free 3 years and older IM  2017   • Influenza, injectable, quadrivalent, preservative free 0.5 mL 2018, 2019   • Influenza, seasonal, injectable, preservative free 10/31/2024   • Tdap 2020     Pap smear:2024  Mammogram:2024   Colonoscopy:2021  Cologuard:Not on file   DEXA scan:Not on file      FOLLOW UP   Return for has a scheduled follow up apt.    CURRENT MEDICATIONS     Current Outpatient Medications:   •  atorvastatin (LIPITOR) 20 mg tablet, Take 1 tablet (20 mg total) by mouth daily, Disp: 90 tablet, Rfl: 3  •  buPROPion (WELLBUTRIN XL) 300 mg 24 hr tablet, TAKE 1 TABLET DAILY, Disp: 90 tablet, Rfl: 3  •  minoxidil (LONITEN) 2.5 mg tablet, Take 1 tablet (2.5 mg total) by mouth daily, Disp: 90 tablet, Rfl: 1  •  tirzepatide (Zepbound) 5 mg/0.5 mL auto-injector, Inject 0.5 mL (5 mg total) under the skin once a week, Disp: 6 mL, Rfl: 0      CHIEF COMPLIANT     Chief Complaint   Patient presents with   • Follow-up     Weight loss        HISTORY OF PRESENT ILLNESS    History of Present Illness     History obtained from : patient  HPI  Review of Systems    PAST MEDICAL & SURGICAL HISTORY     Past Medical History:   Diagnosis Date   • Allergic Unsure   • Anxiety    • Arthritis    • Chronic pain of right knee 2016    Last assessed    • Depression    • Fibroids, submucosal 02/15/2021   • Headache(784.0) 2016   • Lupus    • Migrainous headache without aura 2016    Last assessed   • Obesity 1984   • Raynaud disease    • Rheumatoid arthritis (HCC)    • Sjogren's disease (HCC)    • Visual impairment 2014    Sjogrens     Past Surgical History:   Procedure Laterality Date   • APPENDECTOMY     • BREAST BIOPSY Left 2017    benign   • CARPAL TUNNEL RELEASE Bilateral    •  SECTION      x2   • KNEE ARTHROSCOPY Right    • MYOMECTOMY     • MYOMECTOMY     • MYOMECTOMY N/A 2021    Procedure: MYOMECTOMY;  Surgeon: Kandice Rai MD;  Location: [facility];   "Service: Gynecology   • MT HYSTEROSCOPY BX ENDOMETRIUM&/POLYPC W/WO D&C N/A 02/26/2021    Procedure: DILATATION AND CURETTAGE (D&C) WITH HYSTEROSCOPY;  Surgeon: Kandice Rai MD;  Location: [facility];  Service: Gynecology   • TUBAL LIGATION  2009       OBJECTIVES      /70 (BP Location: Left arm, Patient Position: Sitting, Cuff Size: Large)   Pulse 75   Temp 97.6 °F (36.4 °C) (Tympanic)   Resp 14   Ht 4' 11.5\" (1.511 m)   Wt 74.1 kg (163 lb 6.4 oz)   LMP 01/18/2021 Comment:  Bleeding   SpO2 98%   BMI 32.45 kg/m²    Physical Exam  Vitals reviewed.   Constitutional:       General: She is not in acute distress.     Appearance: Normal appearance. She is not ill-appearing, toxic-appearing or diaphoretic.   HENT:      Head: Normocephalic and atraumatic.      Right Ear: External ear normal.      Left Ear: External ear normal.      Nose: No congestion or rhinorrhea.   Eyes:      General: No scleral icterus.        Right eye: No discharge.         Left eye: No discharge.      Conjunctiva/sclera: Conjunctivae normal.   Cardiovascular:      Rate and Rhythm: Normal rate.   Pulmonary:      Effort: Pulmonary effort is normal. No respiratory distress.   Neurological:      General: No focal deficit present.      Mental Status: She is alert and oriented to person, place, and time.   Psychiatric:         Mood and Affect: Mood normal.         Behavior: Behavior normal.         Thought Content: Thought content normal.           Brijesh Rowland MD  Family Medicine Physician   [facility] [place]      Administrative Statements     Medications have been reviewed by provider in current encounter          "

## 2025-04-03 NOTE — ASSESSMENT & PLAN NOTE
Currently on atorvastatin 20 mg daily.  Has pending labs to do prior to next appointment in June.

## 2025-04-03 NOTE — ASSESSMENT & PLAN NOTE
Wt Readings from Last 3 Encounters:   04/03/25 74.1 kg (163 lb 6.4 oz)   12/26/24 78.5 kg (173 lb)   11/11/24 78.7 kg (173 lb 6.4 oz)   Initial weight (11/11/24): 173 lbs, Body mass index is 34.44 kg/m².   TWL: -10 lbs  Previously: Tried Ozempic-insurance did not cover.  Patient requested sending injectable to [facility].  Currently prescribed anti-obesity meds.  Zepbound 5 mg once a week injection  Reports taking as prescribed.  Assessment: Improving weight/BMI.   Would benefit from staying on Zepbound 5 mg longer, has been only taking this dosage for couple weeks now.  Med management:Continue current regimen as prescribed without changes.   Refill provided  Orders:  •  tirzepatide (Zepbound) 5 mg/0.5 mL auto-injector; Inject 0.5 mL (5 mg total) under the skin once a week

## 2025-04-07 ENCOUNTER — TELEPHONE (OUTPATIENT)
Age: 55
End: 2025-04-07

## 2025-04-07 NOTE — TELEPHONE ENCOUNTER
PA for (Zepbound) 5 mg/0.5 mL auto-injector  CANCELLED due to     []Approval on file-dates approved   [x]Medication covered with no prior authorization required  []Brand Name Preferred  []Patient no longer covered by insurance  []Therapy Changed/Medication Discontinued    Patient advised by     [x]My Chart Message  []Phone call    Scanned into Media  yes

## 2025-04-07 NOTE — TELEPHONE ENCOUNTER
PA for Zepbound) 5 mg/0.5 mL auto-injector SUBMITTED to     via    [x]CMM-KEY: [id number]  [][employer]s-Case ID #   []Availity-Auth ID # NDC #   []Faxed to plan   []Other website   []Phone call Case ID #     [x]PA sent as URGENT    All office notes, labs and other pertaining documents and studies sent. Clinical questions answered. Awaiting determination from insurance company.     Turnaround time for your insurance to make a decision on your Prior Authorization can take 7-21 business days.             '

## 2025-05-08 DIAGNOSIS — L65.0 TELOGEN EFFLUVIUM: ICD-10-CM

## 2025-05-08 DIAGNOSIS — L64.9 ANDROGENIC ALOPECIA: ICD-10-CM

## 2025-05-08 RX ORDER — MINOXIDIL 2.5 MG/1
2.5 TABLET ORAL DAILY
Qty: 90 TABLET | Refills: 1 | Status: SHIPPED | OUTPATIENT
Start: 2025-05-08

## 2025-05-30 ENCOUNTER — APPOINTMENT (OUTPATIENT)
Age: 55
End: 2025-05-30
Payer: COMMERCIAL

## 2025-05-30 DIAGNOSIS — E78.2 MIXED HYPERLIPIDEMIA: ICD-10-CM

## 2025-05-30 LAB
25(OH)D3 SERPL-MCNC: 39.8 NG/ML (ref 30–100)
ALBUMIN SERPL BCG-MCNC: 4.6 G/DL (ref 3.5–5)
ALP SERPL-CCNC: 84 U/L (ref 34–104)
ALT SERPL W P-5'-P-CCNC: 16 U/L (ref 7–52)
ANION GAP SERPL CALCULATED.3IONS-SCNC: 6 MMOL/L (ref 4–13)
AST SERPL W P-5'-P-CCNC: 20 U/L (ref 13–39)
BASOPHILS # BLD AUTO: 0.05 THOUSANDS/ÂΜL (ref 0–0.1)
BASOPHILS NFR BLD AUTO: 1 % (ref 0–1)
BILIRUB SERPL-MCNC: 0.8 MG/DL (ref 0.2–1)
BUN SERPL-MCNC: 18 MG/DL (ref 5–25)
CALCIUM SERPL-MCNC: 9.5 MG/DL (ref 8.4–10.2)
CHLORIDE SERPL-SCNC: 106 MMOL/L (ref 96–108)
CHOLEST SERPL-MCNC: 126 MG/DL (ref ?–200)
CO2 SERPL-SCNC: 31 MMOL/L (ref 21–32)
CREAT SERPL-MCNC: 0.83 MG/DL (ref 0.6–1.3)
EOSINOPHIL # BLD AUTO: 0.08 THOUSAND/ÂΜL (ref 0–0.61)
EOSINOPHIL NFR BLD AUTO: 1 % (ref 0–6)
ERYTHROCYTE [DISTWIDTH] IN BLOOD BY AUTOMATED COUNT: 12.8 % (ref 11.6–15.1)
GFR SERPL CREATININE-BSD FRML MDRD: 80 ML/MIN/1.73SQ M
GLUCOSE P FAST SERPL-MCNC: 90 MG/DL (ref 65–99)
HCT VFR BLD AUTO: 39.3 % (ref 34.8–46.1)
HDLC SERPL-MCNC: 46 MG/DL
HGB BLD-MCNC: 13.2 G/DL (ref 11.5–15.4)
IMM GRANULOCYTES # BLD AUTO: 0.02 THOUSAND/UL (ref 0–0.2)
IMM GRANULOCYTES NFR BLD AUTO: 0 % (ref 0–2)
LDLC SERPL CALC-MCNC: 66 MG/DL (ref 0–100)
LYMPHOCYTES # BLD AUTO: 1.72 THOUSANDS/ÂΜL (ref 0.6–4.47)
LYMPHOCYTES NFR BLD AUTO: 27 % (ref 14–44)
MCH RBC QN AUTO: 31.4 PG (ref 26.8–34.3)
MCHC RBC AUTO-ENTMCNC: 33.6 G/DL (ref 31.4–37.4)
MCV RBC AUTO: 93 FL (ref 82–98)
MONOCYTES # BLD AUTO: 0.35 THOUSAND/ÂΜL (ref 0.17–1.22)
MONOCYTES NFR BLD AUTO: 6 % (ref 4–12)
NEUTROPHILS # BLD AUTO: 4.08 THOUSANDS/ÂΜL (ref 1.85–7.62)
NEUTS SEG NFR BLD AUTO: 65 % (ref 43–75)
NONHDLC SERPL-MCNC: 80 MG/DL
NRBC BLD AUTO-RTO: 0 /100 WBCS
PLATELET # BLD AUTO: 182 THOUSANDS/UL (ref 149–390)
PMV BLD AUTO: 13.4 FL (ref 8.9–12.7)
POTASSIUM SERPL-SCNC: 4.3 MMOL/L (ref 3.5–5.3)
PROT SERPL-MCNC: 6.7 G/DL (ref 6.4–8.4)
RBC # BLD AUTO: 4.21 MILLION/UL (ref 3.81–5.12)
SODIUM SERPL-SCNC: 143 MMOL/L (ref 135–147)
T4 FREE SERPL-MCNC: 0.77 NG/DL (ref 0.61–1.12)
TRIGL SERPL-MCNC: 70 MG/DL (ref ?–150)
TSH SERPL DL<=0.05 MIU/L-ACNC: 1.94 UIU/ML (ref 0.45–4.5)
WBC # BLD AUTO: 6.3 THOUSAND/UL (ref 4.31–10.16)

## 2025-05-30 PROCEDURE — 80061 LIPID PANEL: CPT

## 2025-05-30 PROCEDURE — 84443 ASSAY THYROID STIM HORMONE: CPT

## 2025-05-30 PROCEDURE — 84439 ASSAY OF FREE THYROXINE: CPT

## 2025-05-30 PROCEDURE — 82172 ASSAY OF APOLIPOPROTEIN: CPT

## 2025-06-01 LAB — APO B SERPL-MCNC: 65 MG/DL

## 2025-06-02 ENCOUNTER — RESULTS FOLLOW-UP (OUTPATIENT)
Age: 55
End: 2025-06-02

## 2025-06-02 ENCOUNTER — OFFICE VISIT (OUTPATIENT)
Age: 55
End: 2025-06-02
Payer: COMMERCIAL

## 2025-06-02 VITALS
DIASTOLIC BLOOD PRESSURE: 60 MMHG | SYSTOLIC BLOOD PRESSURE: 100 MMHG | WEIGHT: 150.8 LBS | TEMPERATURE: 98.4 F | OXYGEN SATURATION: 99 % | RESPIRATION RATE: 14 BRPM | BODY MASS INDEX: 29.61 KG/M2 | HEIGHT: 60 IN | HEART RATE: 84 BPM

## 2025-06-02 DIAGNOSIS — E78.2 MIXED HYPERLIPIDEMIA: ICD-10-CM

## 2025-06-02 DIAGNOSIS — E66.811 OBESITY, CLASS I, BMI 30-34.9: Primary | Chronic | ICD-10-CM

## 2025-06-02 DIAGNOSIS — E55.9 VITAMIN D DEFICIENCY: ICD-10-CM

## 2025-06-02 DIAGNOSIS — F41.9 ANXIETY: ICD-10-CM

## 2025-06-02 DIAGNOSIS — F33.41 RECURRENT MAJOR DEPRESSIVE DISORDER, IN PARTIAL REMISSION (HCC): ICD-10-CM

## 2025-06-02 PROCEDURE — 99214 OFFICE O/P EST MOD 30 MIN: CPT | Performed by: FAMILY MEDICINE

## 2025-06-02 RX ORDER — CHOLECALCIFEROL (VITAMIN D3) 1250 MCG
50000 CAPSULE ORAL 2 TIMES WEEKLY
Qty: 8 CAPSULE | Refills: 0 | Status: SHIPPED | OUTPATIENT
Start: 2025-06-02

## 2025-06-02 NOTE — PROGRESS NOTES
Name: Mariluo Thornton      : 1970      MRN: [id number]  Encounter Provider: Brijesh Rowland MD  Encounter Date: 2025   Encounter department: [facility] [place]  :  Assessment & Plan  Obesity, Class I, BMI 30-34.9  Wt Readings from Last 3 Encounters:   25 68.4 kg (150 lb 12.8 oz)   25 74.1 kg (163 lb 6.4 oz)   24 78.5 kg (173 lb)   Initial weight (24): 173 lbs, Body mass index is 34.44 kg/m².   TWL: -23 lbs  Previously: Tried Ozempic-insurance did not cover.  Patient requested sending injectable to [facility].  Currently prescribed anti-obesity meds.  Zepbound 5 mg once a week injection  Reports taking as prescribed.  Assessment: Improving weight/BMI.   Would benefit from staying on Zepbound 5 mg longer, has been only taking this dosage for couple weeks now.  Med management:Continue current regimen as prescribed without changes.   Recommended focusing on building good habits over time by setting and being consistent with realistic goals.   Improve nutritional intake (recommended Mediterranean diet, low-carb diet)  Calorie control (creating a modest calorie deficit of 500-1000 -calorie/day)  Monitoring portion size and frequency of intake, limiting snacking as discussed  Aiming for Whole Foods and healthy fats  Limiting added sugars and processed foods as discussed  Exercising and move body throughout the day and regularly as discussed  Prioritize sleep and mental health  Return in 3 months for close monitoring.           Mixed hyperlipidemia  2024 Lipoprotein a measurement: 54.7   2025 Reviewed & discussed labs today.   Currently prescribed the following:  atorvastatin 20 mg daily  Reports taking as prescribed.  Assessment&Plan: Controlled  Med changes: None. Continue current regimen and working on lifestyle improvement.   Lifestyle modifications advised  Including improving nutritional intake and exercising regularly, really focusing on building  "good habits as discussed.  Goal of <60 Apolipoprotein B & LDL, <100 triglycerides, and >60 HDL  Monitor lipid panel annually            Recurrent major depressive disorder, in partial remission (HCC)  Anxiety  Currently prescribed: wllbutrin 300 mg ER daily  Today reports: Taking as prescribed.  Controlled.    Med Adjusted: None. Continue current regimen.          Vitamin D deficiency  Reviewed & Discussed labs  Deficient vitamin D levels  Goal , preventative therapy   Start vitamin D3 50,000 units two times a week, no further refills.   After weekly prescription dose, start vitamin D3 2,000-5,000 units daily supplements from over-the-counter   Follow-up with vitamin D level after completion of high-dose prescription.   Advised taking vitamin K2 100-200 mcg daily for preventative measures for vascular calcification.   Printed instructions provided.      Orders:  •  Cholecalciferol (Vitamin D3) 1.25 MG (25245 UT) CAPS; Take 1 capsule (50,000 Units total) by mouth 2 (two) times a week 2 times a week for additional 4 weeks then start maintenance dose from over-the-counter.          Depression Screening and Follow-up Plan: Patient was screened for depression during today's encounter. They screened negative with a PHQ-9 score of 0.        History of Present Illness   HPI  Review of Systems    Objective   /60 (BP Location: Left arm, Patient Position: Sitting, Cuff Size: Standard)   Pulse 84   Temp 98.4 °F (36.9 °C) (Tympanic)   Resp 14   Ht 4' 11.5\" (1.511 m)   Wt 68.4 kg (150 lb 12.8 oz)   LMP 01/18/2021 Comment:  Bleeding   SpO2 99%   BMI 29.95 kg/m²      Physical Exam  Vitals reviewed.   Constitutional:       General: She is not in acute distress.     Appearance: Normal appearance. She is not ill-appearing, toxic-appearing or diaphoretic.   HENT:      Head: Normocephalic and atraumatic.      Right Ear: External ear normal.      Left Ear: External ear normal.      Nose: No congestion or " rhinorrhea.     Eyes:      General: No scleral icterus.        Right eye: No discharge.         Left eye: No discharge.      Conjunctiva/sclera: Conjunctivae normal.       Cardiovascular:      Rate and Rhythm: Normal rate.   Pulmonary:      Effort: Pulmonary effort is normal. No respiratory distress.     Neurological:      General: No focal deficit present.      Mental Status: She is alert and oriented to person, place, and time.     Psychiatric:         Mood and Affect: Mood normal.         Behavior: Behavior normal.         Thought Content: Thought content normal.

## 2025-06-02 NOTE — ASSESSMENT & PLAN NOTE
11/09/2024 Lipoprotein a measurement: 54.7   05/30/2025 Reviewed & discussed labs today.   Currently prescribed the following:  atorvastatin 20 mg daily  Reports taking as prescribed.  Assessment&Plan: Controlled  Med changes: None. Continue current regimen and working on lifestyle improvement.   Lifestyle modifications advised  Including improving nutritional intake and exercising regularly, really focusing on building good habits as discussed.  Goal of <60 Apolipoprotein B & LDL, <100 triglycerides, and >60 HDL  Monitor lipid panel annually

## 2025-06-02 NOTE — ASSESSMENT & PLAN NOTE
Reviewed & Discussed labs  Deficient vitamin D levels  Goal , preventative therapy   Start vitamin D3 50,000 units two times a week, no further refills.   After weekly prescription dose, start vitamin D3 2,000-5,000 units daily supplements from over-the-counter   Follow-up with vitamin D level after completion of high-dose prescription.   Advised taking vitamin K2 100-200 mcg daily for preventative measures for vascular calcification.   Printed instructions provided.      Orders:  •  Cholecalciferol (Vitamin D3) 1.25 MG (02164 UT) CAPS; Take 1 capsule (50,000 Units total) by mouth 2 (two) times a week 2 times a week for additional 4 weeks then start maintenance dose from over-the-counter.

## 2025-06-02 NOTE — ASSESSMENT & PLAN NOTE
Wt Readings from Last 3 Encounters:   06/02/25 68.4 kg (150 lb 12.8 oz)   04/03/25 74.1 kg (163 lb 6.4 oz)   12/26/24 78.5 kg (173 lb)   Initial weight (11/11/24): 173 lbs, Body mass index is 34.44 kg/m².   TWL: -23 lbs  Previously: Tried Ozempic-insurance did not cover.  Patient requested sending injectable to [facility].  Currently prescribed anti-obesity meds.  Zepbound 5 mg once a week injection  Reports taking as prescribed.  Assessment: Improving weight/BMI.   Would benefit from staying on Zepbound 5 mg longer, has been only taking this dosage for couple weeks now.  Med management:Continue current regimen as prescribed without changes.   Recommended focusing on building good habits over time by setting and being consistent with realistic goals.   Improve nutritional intake (recommended Mediterranean diet, low-carb diet)  Calorie control (creating a modest calorie deficit of 500-1000 -calorie/day)  Monitoring portion size and frequency of intake, limiting snacking as discussed  Aiming for Whole Foods and healthy fats  Limiting added sugars and processed foods as discussed  Exercising and move body throughout the day and regularly as discussed  Prioritize sleep and mental health  Return in 3 months for close monitoring.

## 2025-06-24 DIAGNOSIS — E55.9 VITAMIN D DEFICIENCY: ICD-10-CM

## 2025-06-25 DIAGNOSIS — E66.811 OBESITY, CLASS I, BMI 30-34.9: Chronic | ICD-10-CM

## 2025-06-25 RX ORDER — CHOLECALCIFEROL (VITAMIN D3) 1250 MCG
CAPSULE ORAL
Qty: 24 CAPSULE | Refills: 1 | OUTPATIENT
Start: 2025-06-25

## 2025-06-25 RX ORDER — TIRZEPATIDE 5 MG/.5ML
5 INJECTION, SOLUTION SUBCUTANEOUS WEEKLY
Qty: 6 ML | Refills: 0 | Status: SHIPPED | OUTPATIENT
Start: 2025-06-25

## 2025-08-12 ENCOUNTER — ANNUAL EXAM (OUTPATIENT)
Dept: OBGYN CLINIC | Facility: CLINIC | Age: 55
End: 2025-08-12
Payer: COMMERCIAL

## (undated) DEVICE — CYSTO TUBING SINGLE IRRIGATION

## (undated) DEVICE — SYRINGE 10ML LL

## (undated) DEVICE — NEEDLE 25G X 1 1/2

## (undated) DEVICE — STERILE 8 INCH PROCTO SWAB: Brand: CARDINAL HEALTH

## (undated) DEVICE — DRAPE EQUIPMENT RF WAND

## (undated) DEVICE — PACK FLUENT DISP

## (undated) DEVICE — SCD SEQUENTIAL COMPRESSION COMFORT SLEEVE MEDIUM KNEE LENGTH: Brand: KENDALL SCD

## (undated) DEVICE — LIGHT HANDLE COVER SLEEVE DISP BLUE STELLAR

## (undated) DEVICE — PENCIL ELECTROSURG E-Z CLEAN -0035H

## (undated) DEVICE — PVC URETHRAL CATHETER: Brand: DOVER

## (undated) DEVICE — STRL ALLENTOWN HYSTEROSCOPY PK: Brand: CARDINAL HEALTH

## (undated) DEVICE — SPONGE 4 X 4 XRAY 16 PLY STRL LF RFD

## (undated) DEVICE — GLOVE INDICATOR PI UNDERGLOVE SZ 6.5 BLUE

## (undated) DEVICE — PAD GROUNDING ADULT

## (undated) DEVICE — SKIN MARKER DUAL TIP WITH RULER CAP, FLEXIBLE RULER AND LABELS: Brand: DEVON

## (undated) DEVICE — MAYO STAND COVER: Brand: CONVERTORS

## (undated) DEVICE — CHLORHEXIDINE 4PCT 4 OZ

## (undated) DEVICE — SPECIMEN CONTAINER STERILE PEEL PACK

## (undated) DEVICE — BETHLEHEM UNIVERSAL MINOR VAG: Brand: CARDINAL HEALTH